# Patient Record
Sex: FEMALE | Race: WHITE | NOT HISPANIC OR LATINO | Employment: UNEMPLOYED | URBAN - METROPOLITAN AREA
[De-identification: names, ages, dates, MRNs, and addresses within clinical notes are randomized per-mention and may not be internally consistent; named-entity substitution may affect disease eponyms.]

---

## 2017-01-12 DIAGNOSIS — F41.1 GAD (GENERALIZED ANXIETY DISORDER): ICD-10-CM

## 2017-01-12 RX ORDER — TRAZODONE HYDROCHLORIDE 150 MG/1
TABLET ORAL
Qty: 90 TABLET | Refills: 1 | Status: SHIPPED | OUTPATIENT
Start: 2017-01-12 | End: 2017-07-10 | Stop reason: SDUPTHER

## 2017-04-07 DIAGNOSIS — I10 ESSENTIAL HYPERTENSION: ICD-10-CM

## 2017-04-07 RX ORDER — METOPROLOL SUCCINATE 50 MG/1
TABLET, EXTENDED RELEASE ORAL
Qty: 90 TABLET | Refills: 2 | Status: SHIPPED | OUTPATIENT
Start: 2017-04-07 | End: 2017-11-09 | Stop reason: SDUPTHER

## 2017-05-10 ENCOUNTER — CLINICAL SUPPORT (OUTPATIENT)
Dept: FAMILY MEDICINE | Facility: CLINIC | Age: 52
End: 2017-05-10
Payer: COMMERCIAL

## 2017-05-10 DIAGNOSIS — F41.1 GAD (GENERALIZED ANXIETY DISORDER): ICD-10-CM

## 2017-05-10 LAB
ALBUMIN SERPL BCP-MCNC: 4.1 G/DL
ALP SERPL-CCNC: 43 U/L
ALT SERPL W/O P-5'-P-CCNC: 15 U/L
ANION GAP SERPL CALC-SCNC: 8 MMOL/L
AST SERPL-CCNC: 20 U/L
BASOPHILS # BLD AUTO: 0.03 K/UL
BASOPHILS NFR BLD: 0.5 %
BILIRUB SERPL-MCNC: 0.8 MG/DL
BUN SERPL-MCNC: 11 MG/DL
CALCIUM SERPL-MCNC: 9.7 MG/DL
CHLORIDE SERPL-SCNC: 108 MMOL/L
CO2 SERPL-SCNC: 26 MMOL/L
CREAT SERPL-MCNC: 0.7 MG/DL
DIFFERENTIAL METHOD: ABNORMAL
EOSINOPHIL # BLD AUTO: 0.2 K/UL
EOSINOPHIL NFR BLD: 2.6 %
ERYTHROCYTE [DISTWIDTH] IN BLOOD BY AUTOMATED COUNT: 11.5 %
EST. GFR  (AFRICAN AMERICAN): >60 ML/MIN/1.73 M^2
EST. GFR  (NON AFRICAN AMERICAN): >60 ML/MIN/1.73 M^2
GLUCOSE SERPL-MCNC: 98 MG/DL
HCT VFR BLD AUTO: 44 %
HGB BLD-MCNC: 15 G/DL
LYMPHOCYTES # BLD AUTO: 1.7 K/UL
LYMPHOCYTES NFR BLD: 28.8 %
MCH RBC QN AUTO: 32.4 PG
MCHC RBC AUTO-ENTMCNC: 34.1 %
MCV RBC AUTO: 95 FL
MONOCYTES # BLD AUTO: 0.6 K/UL
MONOCYTES NFR BLD: 10.1 %
NEUTROPHILS # BLD AUTO: 3.3 K/UL
NEUTROPHILS NFR BLD: 58 %
PLATELET # BLD AUTO: 153 K/UL
PMV BLD AUTO: 10.6 FL
POTASSIUM SERPL-SCNC: 4.4 MMOL/L
PROT SERPL-MCNC: 7.2 G/DL
RBC # BLD AUTO: 4.63 M/UL
SODIUM SERPL-SCNC: 142 MMOL/L
WBC # BLD AUTO: 5.73 K/UL

## 2017-05-10 PROCEDURE — 36415 COLL VENOUS BLD VENIPUNCTURE: CPT | Mod: S$GLB,,, | Performed by: FAMILY MEDICINE

## 2017-05-10 PROCEDURE — 85025 COMPLETE CBC W/AUTO DIFF WBC: CPT

## 2017-05-10 PROCEDURE — 80053 COMPREHEN METABOLIC PANEL: CPT

## 2017-05-14 DIAGNOSIS — F41.1 GAD (GENERALIZED ANXIETY DISORDER): ICD-10-CM

## 2017-05-15 ENCOUNTER — OFFICE VISIT (OUTPATIENT)
Dept: FAMILY MEDICINE | Facility: CLINIC | Age: 52
End: 2017-05-15
Payer: COMMERCIAL

## 2017-05-15 VITALS
HEART RATE: 84 BPM | BODY MASS INDEX: 27.38 KG/M2 | RESPIRATION RATE: 20 BRPM | HEIGHT: 61 IN | SYSTOLIC BLOOD PRESSURE: 100 MMHG | DIASTOLIC BLOOD PRESSURE: 60 MMHG | WEIGHT: 145 LBS

## 2017-05-15 DIAGNOSIS — Z00.00 ENCOUNTER FOR PREVENTIVE HEALTH EXAMINATION: ICD-10-CM

## 2017-05-15 DIAGNOSIS — F41.1 GAD (GENERALIZED ANXIETY DISORDER): ICD-10-CM

## 2017-05-15 DIAGNOSIS — E78.5 HYPERLIPIDEMIA, UNSPECIFIED HYPERLIPIDEMIA TYPE: ICD-10-CM

## 2017-05-15 DIAGNOSIS — Z79.899 LONG-TERM USE OF HIGH-RISK MEDICATION: ICD-10-CM

## 2017-05-15 DIAGNOSIS — I10 ESSENTIAL HYPERTENSION: Primary | ICD-10-CM

## 2017-05-15 DIAGNOSIS — F31.9 BIPOLAR DEPRESSION: ICD-10-CM

## 2017-05-15 PROCEDURE — 1160F RVW MEDS BY RX/DR IN RCRD: CPT | Mod: S$GLB,,, | Performed by: FAMILY MEDICINE

## 2017-05-15 PROCEDURE — 99213 OFFICE O/P EST LOW 20 MIN: CPT | Mod: S$GLB,,, | Performed by: FAMILY MEDICINE

## 2017-05-15 PROCEDURE — 3078F DIAST BP <80 MM HG: CPT | Mod: S$GLB,,, | Performed by: FAMILY MEDICINE

## 2017-05-15 PROCEDURE — 99999 PR PBB SHADOW E&M-EST. PATIENT-LVL II: CPT | Mod: PBBFAC,,, | Performed by: FAMILY MEDICINE

## 2017-05-15 PROCEDURE — 3074F SYST BP LT 130 MM HG: CPT | Mod: S$GLB,,, | Performed by: FAMILY MEDICINE

## 2017-05-15 RX ORDER — ALPRAZOLAM 1 MG/1
1 TABLET ORAL 3 TIMES DAILY PRN
Qty: 90 TABLET | Refills: 5 | Status: SHIPPED | OUTPATIENT
Start: 2017-05-15 | End: 2017-10-31 | Stop reason: SDUPTHER

## 2017-05-15 RX ORDER — ARIPIPRAZOLE 5 MG/1
TABLET ORAL
Qty: 90 TABLET | Refills: 1 | Status: SHIPPED | OUTPATIENT
Start: 2017-05-15 | End: 2017-11-09 | Stop reason: SDUPTHER

## 2017-05-15 NOTE — MR AVS SNAPSHOT
[address] [facility] [address]  Phone: [phone]  Fax: [phone]                  Lyndsey Gan   5/15/2017 10:30 AM   Office Visit    Description:  Female : 1965   Provider:  Delbert Sanchez MD   Department:  [facility]           Reason for Visit     Follow-up     Medication Refill           Diagnoses this Visit        Comments    Essential hypertension    -  Primary     BILL (generalized anxiety disorder)         Hyperlipidemia, unspecified hyperlipidemia type         Encounter for preventive health examination         Long-term use of high-risk medication         Bipolar depression                To Do List           Goals (5 Years of Data)     None      Follow-Up and Disposition     Return in about 1 year (around 5/15/2018).    Follow-up and Disposition History       These Medications        Disp Refills Start End    alprazolam (XANAX) 1 MG tablet 90 tablet 5 5/15/2017     Take 1 tablet (1 mg total) by mouth 3 (three) times daily as needed. - Oral    Pharmacy: [facility] [place] [address] Ph #: [phone]         [facility]     [facility] Care Line -  Assistance  Unless otherwise directed by your provider, please contact [facility], our nurse care line that is available for  assistance.     Registered nurses in the [facility] provide: appointment scheduling, clinical advisement, health education, and other advisory services.  Call: [phone] (toll free)               Medications           Message regarding Medications     Verify the changes and/or additions to your medication regime listed below are the same as discussed with your clinician today.  If any of these changes or additions are incorrect, please notify your healthcare provider.        STOP taking these medications     albuterol (PROAIR HFA) 90 mcg/actuation inhaler Inhale 2 puffs into  "the lungs every 6 (six) hours as needed for Wheezing.    estradiol (VAGIFEM) 10 mcg Tab Place 1 tablet (10 mcg total) vaginally twice a week.           Verify that the below list of medications is an accurate representation of the medications you are currently taking.  If none reported, the list may be blank. If incorrect, please contact your healthcare provider. Carry this list with you in case of emergency.           Current Medications     alprazolam (XANAX) 1 MG tablet Take 1 tablet (1 mg total) by mouth 3 (three) times daily as needed.    aripiprazole (ABILIFY) 5 MG Tab TAKE 1 TABLET DAILY    metoprolol succinate (TOPROL-XL) 50 MG 24 hr tablet TAKE 1 TABLET DAILY    trazodone (DESYREL) 150 MG tablet TAKE 1 TABLET NIGHTLY           Clinical Reference Information           Your Vitals Were     BP Pulse Resp Height Weight BMI    100/60 84 20 5' 1" (1.549 m) 65.8 kg (145 lb) 27.4 kg/m2      Blood Pressure          Most Recent Value    BP  100/60      Allergies as of 5/15/2017     No Known Allergies      Immunizations Administered on Date of Encounter - 5/15/2017     None      [facility] Sign-Up     Activating your [facility] account is as easy as 1-2-3!     1) Visit [email or web address], select Sign Up Now, enter this activation code and your date of birth, then select Next.  [email or web address]  Expires: 6/29/2017 11:46 AM      2) Create a username and password to use when you visit [facility] in the future and select a security question in case you lose your password and select Next.    3) Enter your e-mail address and click Sign Up!    Additional Information  If you have questions, please e-mail [email or web address] or call [phone] to talk to our [facility] staff. Remember, [facility] is NOT to be used for urgent needs. For medical emergencies, dial 911.         Smoking Cessation     If you would like to quit smoking:   You may be eligible for free services if you are a [place] resident and started smoking " cigarettes before September 1, 1988.  Call the Smoking Cessation Trust (SCT) toll free at (745) 507-1831 or (446) 902-4247.   Call 1-800-QUIT-NOW if you do not meet the above criteria.   Contact us via email: [email or web address]   View our website for more information: [email or web address]        Language Assistance Services     ATTENTION: Language assistance services are available, free of charge. Please call [phone].      ATENCIÓN: Si habla español, tiene a schwartz disposición servicios gratuitos de asistencia lingüística. Llame al [phone].     CHÚ Ý: N?u b?n nói Ti?ng Vi?t, có các d?ch v? h? tr? ngôn ng? mi?n phí dành cho b?n. G?i s? [phone].         [facility] complies with applicable Federal civil rights laws and does not discriminate on the basis of race, color, national origin, age, disability, or sex.

## 2017-05-15 NOTE — PROGRESS NOTES
Subjective:       Patient ID: Lyndsey Gan is a 51 y.o. female.    Chief Complaint: Follow-up and Medication Refill    Medication Refill   This is a chronic problem. The problem has been unchanged. Pertinent negatives include no abdominal pain, chest pain, chills, coughing, fever, headaches, joint swelling, nausea, numbness, rash, sore throat, vomiting or weakness. Associated symptoms comments: Social anxiety. The symptoms are aggravated by stress (People make her nervous).     Review of Systems   Constitutional: Negative for appetite change, chills and fever.   HENT: Negative for rhinorrhea, sinus pressure, sore throat and trouble swallowing.    Respiratory: Negative for cough, chest tightness, shortness of breath and wheezing.    Cardiovascular: Negative for chest pain and palpitations.   Gastrointestinal: Negative for abdominal pain, blood in stool, diarrhea, nausea and vomiting.   Genitourinary: Negative for dysuria, flank pain, hematuria, pelvic pain, urgency, vaginal bleeding, vaginal discharge and vaginal pain.   Musculoskeletal: Negative for back pain, joint swelling and neck stiffness.   Skin: Negative for rash.   Neurological: Negative for dizziness, weakness, light-headedness, numbness and headaches.        Sleeping well with diffuser   Hematological: Does not bruise/bleed easily.   Psychiatric/Behavioral: Negative for agitation. The patient is not nervous/anxious.         Social anxiety         Objective:      Physical Exam   Constitutional: She is oriented to person, place, and time. She appears well-developed and well-nourished.   HENT:   Head: Normocephalic.   Mouth/Throat: No oropharyngeal exudate.   Eyes: Pupils are equal, round, and reactive to light.   Neck: Normal range of motion. Neck supple. No thyromegaly present.   Cardiovascular: Normal rate and regular rhythm.    Pulmonary/Chest: No respiratory distress. She has no wheezes. She has no rales. She exhibits no tenderness.   Abdominal: She  exhibits no distension. There is no tenderness. There is no rebound and no guarding.   Musculoskeletal: Normal range of motion. She exhibits no edema or tenderness.   Lymphadenopathy:     She has no cervical adenopathy.   Neurological: She is alert and oriented to person, place, and time. She has normal reflexes. She displays normal reflexes. No cranial nerve deficit. She exhibits normal muscle tone. Coordination normal.   Skin: Skin is warm and dry. No rash noted. No pallor.   Psychiatric: She has a normal mood and affect. Judgment and thought content normal.       Assessment:       No diagnosis found.    Plan:   There are no diagnoses linked to this encounter.

## 2017-07-10 DIAGNOSIS — F41.1 GAD (GENERALIZED ANXIETY DISORDER): ICD-10-CM

## 2017-07-11 RX ORDER — TRAZODONE HYDROCHLORIDE 150 MG/1
TABLET ORAL
Qty: 90 TABLET | Refills: 0 | Status: SHIPPED | OUTPATIENT
Start: 2017-07-11 | End: 2017-10-08 | Stop reason: SDUPTHER

## 2017-10-08 DIAGNOSIS — F41.1 GAD (GENERALIZED ANXIETY DISORDER): ICD-10-CM

## 2017-10-09 RX ORDER — TRAZODONE HYDROCHLORIDE 150 MG/1
TABLET ORAL
Qty: 90 TABLET | Refills: 0 | Status: SHIPPED | OUTPATIENT
Start: 2017-10-09 | End: 2017-11-09 | Stop reason: SDUPTHER

## 2017-10-31 DIAGNOSIS — F31.9 BIPOLAR DEPRESSION: ICD-10-CM

## 2017-10-31 RX ORDER — ALPRAZOLAM 1 MG/1
1 TABLET ORAL 2 TIMES DAILY PRN
Qty: 60 TABLET | Refills: 5 | Status: SHIPPED | OUTPATIENT
Start: 2017-10-31 | End: 2017-12-19 | Stop reason: SDUPTHER

## 2017-10-31 NOTE — TELEPHONE ENCOUNTER
----- Message from Linda Dc sent at 10/31/2017  3:31 PM CDT -----  Contact: Shaheed Gan  MRN: [id number]  : 1965  PCP: Delbert Sanchez  Home Phone      [phone]  Work Phone      Not on file.  [employer]          [phone]    MESSAGE:   Needs RX alprazolam (XANAX) 1 MG tablet refilled    Ph[facility]acy:  [facility] [place]    Phone:  [phone]

## 2017-11-09 DIAGNOSIS — F41.1 GAD (GENERALIZED ANXIETY DISORDER): ICD-10-CM

## 2017-11-09 DIAGNOSIS — I10 ESSENTIAL HYPERTENSION: ICD-10-CM

## 2017-11-09 RX ORDER — TRAZODONE HYDROCHLORIDE 150 MG/1
150 TABLET ORAL NIGHTLY
Qty: 90 TABLET | Refills: 0 | Status: SHIPPED | OUTPATIENT
Start: 2017-11-09 | End: 2018-03-15 | Stop reason: SDUPTHER

## 2017-11-09 RX ORDER — ARIPIPRAZOLE 5 MG/1
5 TABLET ORAL DAILY
Qty: 90 TABLET | Refills: 0 | Status: SHIPPED | OUTPATIENT
Start: 2017-11-09 | End: 2018-02-08 | Stop reason: SDUPTHER

## 2017-11-09 RX ORDER — METOPROLOL SUCCINATE 50 MG/1
50 TABLET, EXTENDED RELEASE ORAL DAILY
Qty: 90 TABLET | Refills: 1 | Status: SHIPPED | OUTPATIENT
Start: 2017-11-09 | End: 2018-05-23 | Stop reason: SDUPTHER

## 2017-11-09 NOTE — TELEPHONE ENCOUNTER
----- Message from Blair Ponce sent at 2017  4:19 PM CST -----  Contact: Patient  Lyndsey Gan  MRN: [id number]  : 1965  PCP: Delbert Sanchez  Home Phone      [phone]  Work Phone      Not on file.  [employer]          [phone]      MESSAGE: requesting refills on Abilify - Toprol - Trazodone -- be sent to [facility]     Call [phone]    PCP: Daniel

## 2017-12-04 ENCOUNTER — TELEPHONE (OUTPATIENT)
Dept: FAMILY MEDICINE | Facility: CLINIC | Age: 52
End: 2017-12-04

## 2017-12-04 NOTE — TELEPHONE ENCOUNTER
----- Message from Jovannydelphine Yoko sent at 2017  4:42 PM CST -----  Contact: padma Gan  MRN: [id number]  : 1965  PCP: Delbert Sanchez  Home Phone      [phone]  Work Phone      Not on file.  [employer]          [phone]      MESSAGE: PT STATED THAT HER XANAX RX CHANGED WITHOUT HER KNOWING. SHE SAID SHE USUALLY TAKES 3 A DAY BUT IT WAS CHANGED TO TWICE A DAY. PLEASE CALL. SAID SHE IS VERY CONCERNED ALREADY AND NEEDS THOSE MEDS.     PHONE: [phone]

## 2017-12-15 DIAGNOSIS — Z12.11 COLON CANCER SCREENING: ICD-10-CM

## 2017-12-19 ENCOUNTER — OFFICE VISIT (OUTPATIENT)
Dept: INTERNAL MEDICINE | Facility: CLINIC | Age: 52
End: 2017-12-19
Payer: COMMERCIAL

## 2017-12-19 VITALS
HEART RATE: 70 BPM | DIASTOLIC BLOOD PRESSURE: 82 MMHG | OXYGEN SATURATION: 99 % | SYSTOLIC BLOOD PRESSURE: 128 MMHG | HEIGHT: 61 IN | RESPIRATION RATE: 18 BRPM | WEIGHT: 136 LBS | BODY MASS INDEX: 25.68 KG/M2

## 2017-12-19 DIAGNOSIS — Z71.6 TOBACCO ABUSE COUNSELING: ICD-10-CM

## 2017-12-19 DIAGNOSIS — F41.9 ANXIETY: Primary | ICD-10-CM

## 2017-12-19 DIAGNOSIS — I10 BENIGN ESSENTIAL HTN: ICD-10-CM

## 2017-12-19 DIAGNOSIS — Z72.0 TOBACCO ABUSE: ICD-10-CM

## 2017-12-19 DIAGNOSIS — F31.9 BIPOLAR DEPRESSION: ICD-10-CM

## 2017-12-19 DIAGNOSIS — E78.5 HYPERLIPIDEMIA LDL GOAL <70: ICD-10-CM

## 2017-12-19 PROCEDURE — 99214 OFFICE O/P EST MOD 30 MIN: CPT | Mod: S$GLB,,, | Performed by: NURSE PRACTITIONER

## 2017-12-19 PROCEDURE — 99999 PR PBB SHADOW E&M-EST. PATIENT-LVL IV: CPT | Mod: PBBFAC,,, | Performed by: NURSE PRACTITIONER

## 2017-12-19 RX ORDER — ALPRAZOLAM 1 MG/1
1 TABLET ORAL 3 TIMES DAILY PRN
Qty: 90 TABLET | Refills: 5 | Status: SHIPPED | OUTPATIENT
Start: 2017-12-19 | End: 2018-05-28 | Stop reason: SDUPTHER

## 2017-12-19 NOTE — PATIENT INSTRUCTIONS
Anxiety Reaction  Anxiety is the feeling we all get when we think something bad might happen. It is a normal response to stress and usually causes only a mild reaction. When anxiety becomes more severe, it can interfere with daily life. In some cases, you may not even be aware of what it is youre anxious about. There may also be a genetic link or it may be a learned behavior in the home.  Both psychological and physical triggers cause stress reaction. It's often a response to fear or emotional stress, real or imagined. This stress may come from home, family, work, or social relationships.  During an anxiety reaction, you may feel:  · Helpless  · Nervous  · Depressed  · Irritable  Your body may show signs of anxiety in many ways. You may experience:  · Dry mouth  · Shakiness  · Dizziness  · Weakness  · Trouble breathing  · Breathing fast (hyperventilating)  · Chest pressure  · Sweating  · Headache  · Nausea  · Diarrhea  · Tiredness  · Inability to sleep  · Sexual problems  Home care  · Try to locate the sources of stress in your life. They may not be obvious. These may include:  ¨ Daily hassles of life (traffic jams, missed appointments, car troubles, etc.)  ¨ Major life changes, both good (new baby, job promotion) and bad (loss of job, loss of loved one)  ¨ Overload: feeling that you have too many responsibilities and can't take care of all of them at once  ¨ Feeling helpless, feeling that your problems are beyond what youre able to solve  · Notice how your body reacts to stress. Learn to listen to your body signals. This will help you take action before the stress becomes severe.  · When you can, do something about the source of your stress. (Avoid hassles, limit the amount of change that happens in your life at one time and take a break when you feel overloaded).  · Unfortunately, many stressful situations can't be avoided. It is necessary to learn how to better manage stress. There are many proven methods  "that will reduce your anxiety. These include simple things like exercise, good nutrition and adequate rest. Also, there are certain techniques that are helpful:  ¨ Relaxation  ¨ Breathing exercises  ¨ Visualization  ¨ Biofeedback  ¨ Meditation  For more information about this, consult your doctor or go to a local bookstore and review the many books and tapes available on this subject.  Follow-up care  If you feel that your anxiety is not responding to self-help measures, contact your doctor or make an appointment with a counselor. You may need short-term psychological counseling and temporary medicine to help you manage stress.  Call 911  Call your healthcare provider right away if any of these occur:  · Trouble breathing  · Confusion  · Drowsiness or trouble wakening  · Fainting or loss of consciousness  · Rapid heart rate  · Seizure  · New chest pain that becomes more severe, lasts longer, or spreads into your shoulder, arm, neck, jaw, or back  When to seek medical advice  Call your healthcare provider right away if any of these occur:  · Your symptoms get worse  · Severe headache not relieved by rest and mild pain reliever  Date Last Reviewed: 9/29/2015  © 2757-9815 [employer]. [address] [place] [address]. All rights reserved. This information is not intended as a substitute for professional medical care. Always follow your healthcare professional's instructions.        Bipolar Disorder  Bipolar disorder is an illness that causes strong mood swings between depression and louis. It used to be called "manic depression." The mood swings are different from the normal ups and downs we all normally experience in our lives. They are more severe, last longer, and can interfere with work and relationships. These episodes are changes from our usual moods and behavior. Their severity can be mild, or drastic and explosive.  · In a manic episode, you may think fast and do things quickly. It may " seem like you are getting a lot done. At first, this may feel very good. But in the extreme this can lead to a lifestyle that is disorganized, chaotic, and includes risky behavior (spending sprees, sexual acting-out, or drug use). In later stages, it may affect eating (no interest in food) and sleeping (unable to sleep for days at a time). Speech may speed up and become difficult for others to understand. You may appear to others as if you are in your own world.  · In a depressive episode, you may feel a lack of interest in normal activities. Sometimes there is sadness or guilt without any clear reason. Thinking may become slow and there can be a lack energy or feeling of hopelessness. Some people have thoughts of harming themselves at this stage. Thoughts can even turn to suicide.  Between these phases you may actually feel OK. This does not mean that the illness is gone. People with this disorder will usually have to treat it all of their life. Medicine and good care can greatly reduce the symptoms.  The exact cause of this illness is unknown. However, there is a genetic link that makes a person more likely to get this problem. Also, the use of drugs such as speed (amphetamine) and cocaine increase the chances of this illness appearing.  Home care  · On-going care and support helps people manage this disease.  Find a healthcare provider and therapist who meet your needs.  Seek help when you feel like you may be heading into either a manic episode or a depressive state.  · Be sure to take your medicine and get regular blood work to check your the levels of medicine in your body.  Take the medicine and get the follow-up lab work as prescribed, even if you think you dont need to do it.  · Be certain to tell each of your healthcare providers about all of the prescription drugs, over-the-counter medicines, and supplements you take. Certain supplements interact with medicines and result in dangerous side effects.  You  can also use your pharmacist as a resource person when you have questions about drug interactions.  · Talk with your family and trusted friends about your thoughts and feelings.  Ask them to help you recognize behavior changes early so you can get help and medicines can be adjusted.  · Alcohol and drugs can bring on an episode, and also make them worse  · If your life is severely impacted by this illness, the Americans with Disabilities Act (ADA) may provide help. The ADA protects people with chronic physical and mental health problems. If you are having trouble keeping jobs, managing workplace issues, or caring for yourself because of your bipolar disorder, contact your local ADA office to see if they can help. The [place] Department  of Justice operates a toll-free ADA information line at: 234.493.8983 (Voice); or 463-634-7992 (TTY).  They can help you locate a local office.    Follow-up care  Follow up with your doctor or therapist as advised. They can help you to find ways to improve your life.  Call 911  Call your healthcare provider right away if any of these occur:  · You have suicidal thoughts, a plan, and the means to  harm yourself  · Trouble breathing  · Confusion  · Drowsiness or trouble wakening  · Fainting or loss of consciousness  · Rapid heart rate, very low heart rate, or a new irregular heart rate  · Seizure  · New chest pain that becomes more severe, lasts longer, or spreads into your shoulder, arm, neck, jaw, or back  When to seek medical advice  Call your healthcare provider right away if any of these occur:  · Feeling like your symptoms are getting worse (depression, agitation, excess energy)  · Unable to eat or sleep for more than 48 hours  · Feeling out of control (racing thoughts, poor concentration)  · Feeling like you want to harm yourself or another  · Being unable to care for yourself  Date Last Reviewed: 9/29/2015  © 5206-9685 [employer]. [address] [facility], [place]  [address]. All rights reserved. This information is not intended as a substitute for professional medical care. Always follow your healthcare professional's instructions.

## 2017-12-19 NOTE — PROGRESS NOTES
Subjective:       Patient ID: Lyndsey Gan is a 52 y.o. female.    Chief Complaint: Medication Management (discuss xanax)    Patient is unknown, to me and presents with   Chief Complaint   Patient presents with    Medication Management     discuss xanax   .  Denies chest pain and shortness of breath.  Patient presents with second opinion and states that she has always taken her xanax tid and lately was changed to bid but states that she really needs it tid. She is a nervous person and states that she has always been on this and not sure why it was changed to bid. Also takes abilify.  She would like to switch over to my clinic. Patient will get mammogram and declines colonoscopy at this time     HPI  Review of Systems   Constitutional: Negative for activity change, appetite change, fatigue, fever and unexpected weight change.   HENT: Negative for congestion, ear discharge, ear pain, hearing loss, postnasal drip and tinnitus.    Eyes: Negative for photophobia, pain and visual disturbance.   Respiratory: Negative for cough, shortness of breath, wheezing and stridor.    Cardiovascular: Negative for chest pain, palpitations and leg swelling.   Gastrointestinal: Negative for abdominal distention.   Genitourinary: Negative for difficulty urinating, dysuria, frequency, hematuria and urgency.   Musculoskeletal: Negative for arthralgias, back pain, gait problem, joint swelling and neck pain.   Skin: Negative.    Neurological: Negative for dizziness, seizures, syncope, weakness, light-headedness, numbness and headaches.   Hematological: Negative for adenopathy. Does not bruise/bleed easily.   Psychiatric/Behavioral: Negative for behavioral problems, confusion, hallucinations, sleep disturbance and suicidal ideas. The patient is nervous/anxious.        Objective:      Physical Exam   Constitutional: She is oriented to person, place, and time. She appears well-developed and well-nourished. No distress.   HENT:   Head:  Normocephalic and atraumatic.   Right Ear: External ear normal.   Left Ear: External ear normal.   Mouth/Throat: Oropharynx is clear and moist. No oropharyngeal exudate.   Eyes: Conjunctivae and EOM are normal. Pupils are equal, round, and reactive to light. Right eye exhibits no discharge. Left eye exhibits no discharge.   Neck: Normal range of motion. Neck supple. No JVD present. No thyromegaly present.   Cardiovascular: Normal rate, regular rhythm, normal heart sounds and intact distal pulses.  Exam reveals no gallop and no friction rub.    No murmur heard.  Pulmonary/Chest: Effort normal and breath sounds normal. No stridor. No respiratory distress. She has no wheezes. She has no rales. She exhibits no tenderness.   Abdominal: Soft. Bowel sounds are normal. She exhibits no distension and no mass. There is no tenderness. There is no rebound.   Musculoskeletal: Normal range of motion. She exhibits no edema or tenderness.   Lymphadenopathy:     She has no cervical adenopathy.   Neurological: She is alert and oriented to person, place, and time. She has normal reflexes. She displays normal reflexes. No cranial nerve deficit. She exhibits normal muscle tone. Coordination normal.   Skin: Skin is warm and dry. Capillary refill takes less than 2 seconds. No rash noted. No erythema. No pallor.   Psychiatric: She has a normal mood and affect. Her behavior is normal. Judgment and thought content normal.   Negative suicidal or homicidal ideations       Assessment:       1. Anxiety    2. Bipolar depression    3. Hyperlipidemia LDL goal <70    4. Benign essential HTN    5. Tobacco abuse    6. Tobacco abuse counseling        Plan:   Lyndsey was seen today for medication management.    Diagnoses and all orders for this visit:    Anxiety  -     Comprehensive metabolic panel; Future  -     CBC auto differential; Future  -     ALPRAZolam (XANAX) 1 MG tablet; Take 1 tablet (1 mg total) by mouth 3 (three) times daily as needed for  "Anxiety (severe anxiety).    Bipolar depression  -     Comprehensive metabolic panel; Future  -     CBC auto differential; Future  -     ALPRAZolam (XANAX) 1 MG tablet; Take 1 tablet (1 mg total) by mouth 3 (three) times daily as needed for Anxiety (severe anxiety).    Hyperlipidemia LDL goal <70  -     Comprehensive metabolic panel; Future  -     CBC auto differential; Future  -     TSH; Future  -     Lipid panel; Future    Benign essential HTN  -     Comprehensive metabolic panel; Future  -     CBC auto differential; Future  -     Microalbumin/creatinine urine ratio; Future    Tobacco abuse  -     Comprehensive metabolic panel; Future  -     CBC auto differential; Future    Tobacco abuse counseling    "This note will not be shared with the patient."  Will start coming here for medication management and check up  Lab drawn today CBC, CMP, TSH, FLP  Limit the cholesterol in your diet to less than 300 mg per day.  Fats should contribute no more than 20 to 35% of your daily calories.  Less than 7 to 10% of your calories should come from saturated fat.  Avoid saturated fat products e.g., butter, some oils, meat, and poultry fat contain a lot of saturated fat.  Check food labels for fat and cholesterol content. Choose the foods with less fat per serving.  Limit the amount of butter and margarine you eat.  Use salad dressings and margarine made with polyunsaturated and monunsaturated fats.  Use egg whites or egg substitutes rather than whole eggs.  Replace whole-milk dairy products with nonfat or low-fat mild, cheese, spreads, and yogurt.  Eat skinless chicken, turkey, fish, and meatless entrees more often than red meat.  Choose lean cuts of meat and trim off all visible fat. Keep portion sizes moderate.  Avoid fatty desserts such as ice cream, cream-filled cakes, and cheesecakes. Choose fresh fruits, nonfat frozen yogurt, Popsicles, etc.  Reduce the amount of fried foods, vending machine food, and fast food you " eat.  Eat fruits and vegetables (especially fresh fruits and leafy vegetables), beans, and whole grains daily. The fiber in these foods helps lower cholesterol.  Look for low-fat or nonfat varieties of the foods you like to eat or look for substitutes.  You may need to exercise 60 minutes a day to prevent weight gain and 90 minutes a day to lose weight.  RTC in six months.

## 2017-12-22 ENCOUNTER — CLINICAL SUPPORT (OUTPATIENT)
Dept: INTERNAL MEDICINE | Facility: CLINIC | Age: 52
End: 2017-12-22
Payer: COMMERCIAL

## 2017-12-22 DIAGNOSIS — E78.5 HYPERLIPIDEMIA LDL GOAL <70: ICD-10-CM

## 2017-12-22 DIAGNOSIS — I10 BENIGN ESSENTIAL HTN: ICD-10-CM

## 2017-12-22 DIAGNOSIS — F31.9 BIPOLAR DEPRESSION: ICD-10-CM

## 2017-12-22 DIAGNOSIS — F41.9 ANXIETY: ICD-10-CM

## 2017-12-22 DIAGNOSIS — Z72.0 TOBACCO ABUSE: ICD-10-CM

## 2017-12-22 LAB
ALBUMIN SERPL BCP-MCNC: 4.1 G/DL
ALP SERPL-CCNC: 53 U/L
ALT SERPL W/O P-5'-P-CCNC: 20 U/L
ANION GAP SERPL CALC-SCNC: 8 MMOL/L
AST SERPL-CCNC: 25 U/L
BASOPHILS # BLD AUTO: 0.03 K/UL
BASOPHILS NFR BLD: 0.5 %
BILIRUB SERPL-MCNC: 0.7 MG/DL
BUN SERPL-MCNC: 10 MG/DL
CALCIUM SERPL-MCNC: 9.9 MG/DL
CHLORIDE SERPL-SCNC: 106 MMOL/L
CHOLEST SERPL-MCNC: 201 MG/DL
CHOLEST/HDLC SERPL: 3.8 {RATIO}
CO2 SERPL-SCNC: 27 MMOL/L
CREAT SERPL-MCNC: 0.7 MG/DL
CREAT UR-MCNC: 164 MG/DL
DIFFERENTIAL METHOD: ABNORMAL
EOSINOPHIL # BLD AUTO: 0.2 K/UL
EOSINOPHIL NFR BLD: 2.5 %
ERYTHROCYTE [DISTWIDTH] IN BLOOD BY AUTOMATED COUNT: 11.5 %
EST. GFR  (AFRICAN AMERICAN): >60 ML/MIN/1.73 M^2
EST. GFR  (NON AFRICAN AMERICAN): >60 ML/MIN/1.73 M^2
GLUCOSE SERPL-MCNC: 97 MG/DL
HCT VFR BLD AUTO: 42.9 %
HDLC SERPL-MCNC: 53 MG/DL
HDLC SERPL: 26.4 %
HGB BLD-MCNC: 15.2 G/DL
IMM GRANULOCYTES # BLD AUTO: 0.02 K/UL
IMM GRANULOCYTES NFR BLD AUTO: 0.3 %
LDLC SERPL CALC-MCNC: 118.4 MG/DL
LYMPHOCYTES # BLD AUTO: 1.8 K/UL
LYMPHOCYTES NFR BLD: 27.7 %
MCH RBC QN AUTO: 33.2 PG
MCHC RBC AUTO-ENTMCNC: 35.4 G/DL
MCV RBC AUTO: 94 FL
MICROALBUMIN UR DL<=1MG/L-MCNC: 16 UG/ML
MICROALBUMIN/CREATININE RATIO: 9.8 UG/MG
MONOCYTES # BLD AUTO: 0.6 K/UL
MONOCYTES NFR BLD: 9 %
NEUTROPHILS # BLD AUTO: 3.9 K/UL
NEUTROPHILS NFR BLD: 60 %
NONHDLC SERPL-MCNC: 148 MG/DL
NRBC BLD-RTO: 0 /100 WBC
PLATELET # BLD AUTO: 185 K/UL
PMV BLD AUTO: 10.5 FL
POTASSIUM SERPL-SCNC: 3.9 MMOL/L
PROT SERPL-MCNC: 7.4 G/DL
RBC # BLD AUTO: 4.58 M/UL
SODIUM SERPL-SCNC: 141 MMOL/L
TRIGL SERPL-MCNC: 148 MG/DL
TSH SERPL DL<=0.005 MIU/L-ACNC: 0.96 UIU/ML
WBC # BLD AUTO: 6.43 K/UL

## 2017-12-22 PROCEDURE — 80061 LIPID PANEL: CPT

## 2017-12-22 PROCEDURE — 36415 COLL VENOUS BLD VENIPUNCTURE: CPT | Mod: S$GLB,,, | Performed by: NURSE PRACTITIONER

## 2017-12-22 PROCEDURE — 85025 COMPLETE CBC W/AUTO DIFF WBC: CPT

## 2017-12-22 PROCEDURE — 80053 COMPREHEN METABOLIC PANEL: CPT

## 2017-12-22 PROCEDURE — 82570 ASSAY OF URINE CREATININE: CPT

## 2017-12-22 PROCEDURE — 84443 ASSAY THYROID STIM HORMONE: CPT

## 2017-12-22 PROCEDURE — 36415 COLL VENOUS BLD VENIPUNCTURE: CPT

## 2017-12-22 PROCEDURE — 99999 PR PBB SHADOW E&M-EST. PATIENT-LVL I: CPT | Mod: PBBFAC,,,

## 2018-02-08 DIAGNOSIS — F41.1 GAD (GENERALIZED ANXIETY DISORDER): ICD-10-CM

## 2018-02-08 RX ORDER — ARIPIPRAZOLE 5 MG/1
TABLET ORAL
Qty: 90 TABLET | Refills: 0 | Status: SHIPPED | OUTPATIENT
Start: 2018-02-08 | End: 2018-05-28 | Stop reason: SDUPTHER

## 2018-03-15 DIAGNOSIS — F41.1 GAD (GENERALIZED ANXIETY DISORDER): ICD-10-CM

## 2018-03-15 RX ORDER — TRAZODONE HYDROCHLORIDE 150 MG/1
TABLET ORAL
Qty: 90 TABLET | Refills: 0 | Status: SHIPPED | OUTPATIENT
Start: 2018-03-15 | End: 2018-05-28 | Stop reason: SDUPTHER

## 2018-04-18 ENCOUNTER — TELEPHONE (OUTPATIENT)
Dept: INTERNAL MEDICINE | Facility: CLINIC | Age: 53
End: 2018-04-18

## 2018-04-18 DIAGNOSIS — I10 BENIGN ESSENTIAL HTN: Primary | ICD-10-CM

## 2018-04-18 NOTE — TELEPHONE ENCOUNTER
Pt scheduled for her routine 6 month with you may 28th but needs order put in for her BW the week before  Please advise  Thanks!

## 2018-05-08 DIAGNOSIS — F41.1 GAD (GENERALIZED ANXIETY DISORDER): ICD-10-CM

## 2018-05-08 RX ORDER — ARIPIPRAZOLE 5 MG/1
TABLET ORAL
Qty: 90 TABLET | Refills: 0 | OUTPATIENT
Start: 2018-05-08

## 2018-05-22 ENCOUNTER — CLINICAL SUPPORT (OUTPATIENT)
Dept: INTERNAL MEDICINE | Facility: CLINIC | Age: 53
End: 2018-05-22
Payer: COMMERCIAL

## 2018-05-22 DIAGNOSIS — I10 BENIGN ESSENTIAL HTN: ICD-10-CM

## 2018-05-22 LAB
ALBUMIN SERPL BCP-MCNC: 4.2 G/DL
ALP SERPL-CCNC: 47 U/L
ALT SERPL W/O P-5'-P-CCNC: 16 U/L
ANION GAP SERPL CALC-SCNC: 6 MMOL/L
AST SERPL-CCNC: 23 U/L
BASOPHILS # BLD AUTO: 0.03 K/UL
BASOPHILS NFR BLD: 0.5 %
BILIRUB SERPL-MCNC: 0.7 MG/DL
BUN SERPL-MCNC: 12 MG/DL
CALCIUM SERPL-MCNC: 9.9 MG/DL
CHLORIDE SERPL-SCNC: 104 MMOL/L
CHOLEST SERPL-MCNC: 213 MG/DL
CHOLEST/HDLC SERPL: 4.3 {RATIO}
CO2 SERPL-SCNC: 29 MMOL/L
CREAT SERPL-MCNC: 0.8 MG/DL
CREAT UR-MCNC: 91 MG/DL
DIFFERENTIAL METHOD: ABNORMAL
EOSINOPHIL # BLD AUTO: 0.2 K/UL
EOSINOPHIL NFR BLD: 2.5 %
ERYTHROCYTE [DISTWIDTH] IN BLOOD BY AUTOMATED COUNT: 11.6 %
EST. GFR  (AFRICAN AMERICAN): >60 ML/MIN/1.73 M^2
EST. GFR  (NON AFRICAN AMERICAN): >60 ML/MIN/1.73 M^2
GLUCOSE SERPL-MCNC: 96 MG/DL
HCT VFR BLD AUTO: 44.4 %
HDLC SERPL-MCNC: 49 MG/DL
HDLC SERPL: 23 %
HGB BLD-MCNC: 15.1 G/DL
IMM GRANULOCYTES # BLD AUTO: 0.02 K/UL
IMM GRANULOCYTES NFR BLD AUTO: 0.3 %
LDLC SERPL CALC-MCNC: 125 MG/DL
LYMPHOCYTES # BLD AUTO: 2 K/UL
LYMPHOCYTES NFR BLD: 32.9 %
MCH RBC QN AUTO: 33.1 PG
MCHC RBC AUTO-ENTMCNC: 34 G/DL
MCV RBC AUTO: 97 FL
MICROALBUMIN UR DL<=1MG/L-MCNC: 43 UG/ML
MICROALBUMIN/CREATININE RATIO: 47.3 UG/MG
MONOCYTES # BLD AUTO: 0.7 K/UL
MONOCYTES NFR BLD: 10.8 %
NEUTROPHILS # BLD AUTO: 3.2 K/UL
NEUTROPHILS NFR BLD: 53 %
NONHDLC SERPL-MCNC: 164 MG/DL
NRBC BLD-RTO: 0 /100 WBC
PLATELET # BLD AUTO: 169 K/UL
PMV BLD AUTO: 11 FL
POTASSIUM SERPL-SCNC: 4 MMOL/L
PROT SERPL-MCNC: 7.4 G/DL
RBC # BLD AUTO: 4.56 M/UL
SODIUM SERPL-SCNC: 139 MMOL/L
TRIGL SERPL-MCNC: 195 MG/DL
TSH SERPL DL<=0.005 MIU/L-ACNC: 2.17 UIU/ML
WBC # BLD AUTO: 6.11 K/UL

## 2018-05-22 PROCEDURE — 80061 LIPID PANEL: CPT

## 2018-05-22 PROCEDURE — 36415 COLL VENOUS BLD VENIPUNCTURE: CPT | Mod: S$GLB,,, | Performed by: NURSE PRACTITIONER

## 2018-05-22 PROCEDURE — 36415 COLL VENOUS BLD VENIPUNCTURE: CPT

## 2018-05-22 PROCEDURE — 80053 COMPREHEN METABOLIC PANEL: CPT

## 2018-05-22 PROCEDURE — 84443 ASSAY THYROID STIM HORMONE: CPT

## 2018-05-22 PROCEDURE — 82043 UR ALBUMIN QUANTITATIVE: CPT

## 2018-05-22 PROCEDURE — 85025 COMPLETE CBC W/AUTO DIFF WBC: CPT

## 2018-05-23 DIAGNOSIS — I10 ESSENTIAL HYPERTENSION: ICD-10-CM

## 2018-05-23 RX ORDER — METOPROLOL SUCCINATE 50 MG/1
TABLET, EXTENDED RELEASE ORAL
Qty: 90 TABLET | Refills: 0 | Status: SHIPPED | OUTPATIENT
Start: 2018-05-23 | End: 2018-05-28 | Stop reason: SDUPTHER

## 2018-05-28 ENCOUNTER — OFFICE VISIT (OUTPATIENT)
Dept: INTERNAL MEDICINE | Facility: CLINIC | Age: 53
End: 2018-05-28
Payer: COMMERCIAL

## 2018-05-28 VITALS
OXYGEN SATURATION: 96 % | SYSTOLIC BLOOD PRESSURE: 122 MMHG | WEIGHT: 141 LBS | HEIGHT: 61 IN | HEART RATE: 78 BPM | DIASTOLIC BLOOD PRESSURE: 70 MMHG | BODY MASS INDEX: 26.62 KG/M2 | RESPIRATION RATE: 20 BRPM

## 2018-05-28 DIAGNOSIS — F31.9 BIPOLAR DEPRESSION: ICD-10-CM

## 2018-05-28 DIAGNOSIS — Z71.6 TOBACCO ABUSE COUNSELING: ICD-10-CM

## 2018-05-28 DIAGNOSIS — I10 BENIGN ESSENTIAL HTN: Primary | ICD-10-CM

## 2018-05-28 DIAGNOSIS — E78.5 HYPERLIPIDEMIA LDL GOAL <70: ICD-10-CM

## 2018-05-28 DIAGNOSIS — F41.9 ANXIETY: ICD-10-CM

## 2018-05-28 DIAGNOSIS — G47.00 INSOMNIA, UNSPECIFIED TYPE: ICD-10-CM

## 2018-05-28 DIAGNOSIS — Z72.0 TOBACCO ABUSE: ICD-10-CM

## 2018-05-28 PROCEDURE — 3008F BODY MASS INDEX DOCD: CPT | Mod: CPTII,S$GLB,, | Performed by: NURSE PRACTITIONER

## 2018-05-28 PROCEDURE — 99214 OFFICE O/P EST MOD 30 MIN: CPT | Mod: S$GLB,,, | Performed by: NURSE PRACTITIONER

## 2018-05-28 PROCEDURE — 3074F SYST BP LT 130 MM HG: CPT | Mod: CPTII,S$GLB,, | Performed by: NURSE PRACTITIONER

## 2018-05-28 PROCEDURE — 3078F DIAST BP <80 MM HG: CPT | Mod: CPTII,S$GLB,, | Performed by: NURSE PRACTITIONER

## 2018-05-28 PROCEDURE — 99999 PR PBB SHADOW E&M-EST. PATIENT-LVL IV: CPT | Mod: PBBFAC,,, | Performed by: NURSE PRACTITIONER

## 2018-05-28 RX ORDER — TRAZODONE HYDROCHLORIDE 150 MG/1
150 TABLET ORAL NIGHTLY
Qty: 90 TABLET | Refills: 1 | Status: SHIPPED | OUTPATIENT
Start: 2018-05-28 | End: 2018-12-07 | Stop reason: SDUPTHER

## 2018-05-28 RX ORDER — ARIPIPRAZOLE 5 MG/1
5 TABLET ORAL DAILY
Qty: 90 TABLET | Refills: 1 | Status: SHIPPED | OUTPATIENT
Start: 2018-05-28 | End: 2018-11-23 | Stop reason: SDUPTHER

## 2018-05-28 RX ORDER — ALPRAZOLAM 1 MG/1
1 TABLET ORAL 3 TIMES DAILY PRN
Qty: 90 TABLET | Refills: 5 | Status: SHIPPED | OUTPATIENT
Start: 2018-05-28 | End: 2018-07-10 | Stop reason: SDUPTHER

## 2018-05-28 RX ORDER — METOPROLOL SUCCINATE 50 MG/1
50 TABLET, EXTENDED RELEASE ORAL DAILY
Qty: 90 TABLET | Refills: 1 | Status: SHIPPED | OUTPATIENT
Start: 2018-05-28 | End: 2019-02-27 | Stop reason: SDUPTHER

## 2018-05-28 NOTE — PATIENT INSTRUCTIONS
"  Risk Factors for Heart Disease  A risk factor is something that increases your chance of having heart disease. Heart disease (also called coronary artery disease) involves damage to the heart arteries. These blood vessels need to work well to provide the oxygen your heart needs to pump blood to the rest of your body. Things like smoking or high cholesterol levels can damage arteries. You cant control some risk factors, such as age and a family history of heart disease. But there are many things you can control to reduce your risk for heart disease.    Unhealthy cholesterol levels  Cholesterol is a fat-like substance in your blood. It can build up along the artery walls. This is called plaque. Over time, plaque narrows the arteries and reduces blood flow to your heart or brain. If a blood clot forms or a piece of the plaque breaks off, it can completely block the artery and cause a heart attack or stroke. Your risk of heart disease goes up if you have high levels of LDL ("bad") cholesterol or triglycerides (another fatty substance that can build up). Youre also at risk if you have low HDL cholesterol ("good") cholesterol. HDL helps clear the bad cholesterol away. You're at risk if you have:  HDL cholesterol 50 mg/dL or lower; LDL cholesterol 100 mg/dL; or triglycerides of 150 mg/dL or higher.  Smoking  This is the most important risk factor you can change. Smoking causes inflammation and damages the smooth muscle that lines the arteries making them less flexible. It also raises your blood pressure, causing further damage to the artery lining. Smoking also increases your risk that your blood may clot, block an artery, and cause a heart attack or stroke. Smoking also damages your lungs, which affects the delivery of oxygen to the body. If you smoke, you are 2 to 4 times more likely to develop coronary artery disease. If you smoke, it's never too late to help your heart. Ask your doctor about nicotine replacement " products and smoking cessation support.  High blood pressure  High blood pressure occurs when blood pushes too hard against artery walls. This causes damage to the artery walls and the formation of scar tissue as it heals. This makes the arteries stiff and weak. Plaque sticks to the scarred tissue narrowing and hardening the arteries. High blood pressure also causes your heart to work harder to get blood out to the body. High blood pressure raises your risk of heart attack, also known as acute myocardial infarction, or AMI, and especially stroke. The brain tissue is especially sensitive to high blood pressure damage. You're at risk if your blood pressure is 120/80 or higher.  Negative emotions  Chronic stress, pent-up anger, and other negative emotions have been linked to heart disease. This occurs because stress increases the levels of a hormone that increase the demand on your heart. Over time, these emotions could raise your heart disease risk.  Metabolic syndrome  This is caused by a combination of certain risk factors. It puts you at extra high risk of heart disease, stroke, and diabetes. You have metabolic syndrome if you have 3 or more of the following: low HDL cholesterol; high triglycerides; high blood pressure; high blood sugar; extra weight around the waist.  Diabetes  Diabetes occurs when you have high levels of sugar (glucose) in your blood. This can damage arteries if not kept under control. Having diabetes also makes you more likely to have a silent heart attack--one without any symptoms.  Excess weight  Excess weight makes other risk factors, such as diabetes, more likely. Excess weight around the waist or stomach increases your heart disease risk the most.  Lack of physical activity  When youre not active, youre more likely to develop diabetes, high blood pressure, high cholesterol levels, and excess weight.     Most people with heart disease have more than one risk factor.   Date Last Reviewed:  3/28/2016  © 3879-6895 [employer]. [address] [place] [address], [place] [address]. All rights reserved. This information is not intended as a substitute for professional medical care. Always follow your healthcare professional's instructions.

## 2018-05-28 NOTE — PROGRESS NOTES
Subjective:       Patient ID: Lyndsey Gan is a 52 y.o. female.    Chief Complaint: Follow-up (x 6 months- results)    Patient is known, to me and presents with   Chief Complaint   Patient presents with    Follow-up     x 6 months- results   .  Denies chest pain and shortness of breath.  Patient presents with check up and results. She is doing ok with no complaints today. Refuses all screenings  HPI  Review of Systems   Constitutional: Negative for activity change, appetite change, fatigue, fever and unexpected weight change.   HENT: Negative for congestion, ear discharge, ear pain, hearing loss, postnasal drip and tinnitus.    Eyes: Negative for photophobia, pain and visual disturbance.   Respiratory: Negative for cough, shortness of breath, wheezing and stridor.    Cardiovascular: Negative for chest pain, palpitations and leg swelling.   Gastrointestinal: Negative for abdominal distention.   Genitourinary: Negative for difficulty urinating, dysuria, frequency, hematuria and urgency.   Musculoskeletal: Negative for arthralgias, back pain, gait problem, joint swelling and neck pain.   Skin: Negative.    Neurological: Negative for dizziness, seizures, syncope, weakness, light-headedness, numbness and headaches.   Hematological: Negative for adenopathy. Does not bruise/bleed easily.   Psychiatric/Behavioral: Negative for behavioral problems, confusion, hallucinations, sleep disturbance and suicidal ideas. The patient is not nervous/anxious.        Objective:      Physical Exam   Constitutional: She is oriented to person, place, and time. She appears well-developed and well-nourished. No distress.   HENT:   Head: Normocephalic and atraumatic.   Right Ear: External ear normal.   Left Ear: External ear normal.   Mouth/Throat: Oropharynx is clear and moist. No oropharyngeal exudate.   Eyes: Conjunctivae and EOM are normal. Pupils are equal, round, and reactive to light. Right eye exhibits no discharge. Left eye  exhibits no discharge.   Neck: Normal range of motion. Neck supple. No JVD present. No thyromegaly present.   Cardiovascular: Normal rate, regular rhythm, normal heart sounds and intact distal pulses.  Exam reveals no gallop and no friction rub.    No murmur heard.  Pulmonary/Chest: Effort normal and breath sounds normal. No stridor. No respiratory distress. She has no wheezes. She has no rales. She exhibits no tenderness.   Abdominal: Soft. Bowel sounds are normal. She exhibits no distension and no mass. There is no tenderness. There is no rebound.   Musculoskeletal: Normal range of motion. She exhibits no edema or tenderness.   Lymphadenopathy:     She has no cervical adenopathy.   Neurological: She is alert and oriented to person, place, and time. She has normal reflexes. She displays normal reflexes. No cranial nerve deficit. She exhibits normal muscle tone. Coordination normal.   Skin: Skin is warm and dry. Capillary refill takes less than 2 seconds. No rash noted. No erythema. No pallor.   Psychiatric: She has a normal mood and affect. Her behavior is normal. Judgment and thought content normal.   Negative SI/HI       Assessment:       1. Benign essential HTN    2. Hyperlipidemia LDL goal <70    3. Anxiety    4. Bipolar depression    5. Insomnia, unspecified type    6. Tobacco abuse    7. Tobacco abuse counseling        Plan:   Lyndsey was seen today for follow-up.    Diagnoses and all orders for this visit:    Benign essential HTN  -     metoprolol succinate (TOPROL-XL) 50 MG 24 hr tablet; Take 1 tablet (50 mg total) by mouth once daily.  -     CBC auto differential; Future  -     Comprehensive metabolic panel; Future  -     Microalbumin/creatinine urine ratio; Future    Hyperlipidemia LDL goal <70  -     CBC auto differential; Future  -     Comprehensive metabolic panel; Future  -     TSH; Future  -     Lipid panel; Future    Anxiety  -     ALPRAZolam (XANAX) 1 MG tablet; Take 1 tablet (1 mg total) by mouth  "3 (three) times daily as needed for Anxiety (severe anxiety).  -     ARIPiprazole (ABILIFY) 5 MG Tab; Take 1 tablet (5 mg total) by mouth once daily.  -     CBC auto differential; Future  -     Comprehensive metabolic panel; Future    Bipolar depression  -     ALPRAZolam (XANAX) 1 MG tablet; Take 1 tablet (1 mg total) by mouth 3 (three) times daily as needed for Anxiety (severe anxiety).  -     CBC auto differential; Future  -     Comprehensive metabolic panel; Future    Insomnia, unspecified type  -     traZODone (DESYREL) 150 MG tablet; Take 1 tablet (150 mg total) by mouth nightly.  -     CBC auto differential; Future  -     Comprehensive metabolic panel; Future    Tobacco abuse  -     CBC auto differential; Future  -     Comprehensive metabolic panel; Future    Tobacco abuse counseling    "This note will not be shared with the patient."  Refills on meds.  Medication compliance was discussed with the patient.   Medication side effects were discussed.  The patient was instructed on using exercise frequently to reduce blood pressure.  Thirty to forty-five minutes of brisk walking three to four times a week is often helpful to lower your blood pressure.  Monitor blood pressures at home and to record the values in a log.  The patient was instructed to monitor weight closely and to try to keep it as close to ideal body weight as possible.  Reduce salt intake to less than 2 grams per day.  Do not add salt to food at the table.  Reduce or get rid of salt used in cooking.  Limit processed and fast foods.  Read package labels for amount of salt (soduim) in foods.  Losing weight, even just 10 pounds, of can decrease blood pressure.  Continue with plan of care  rtc as scheduled  "

## 2018-07-10 DIAGNOSIS — F31.9 BIPOLAR DEPRESSION: ICD-10-CM

## 2018-07-10 DIAGNOSIS — F41.9 ANXIETY: ICD-10-CM

## 2018-07-10 RX ORDER — ALPRAZOLAM 1 MG/1
TABLET ORAL
Qty: 90 TABLET | Refills: 2 | Status: SHIPPED | OUTPATIENT
Start: 2018-07-10 | End: 2018-11-20 | Stop reason: SDUPTHER

## 2018-11-14 ENCOUNTER — CLINICAL SUPPORT (OUTPATIENT)
Dept: INTERNAL MEDICINE | Facility: CLINIC | Age: 53
End: 2018-11-14
Payer: COMMERCIAL

## 2018-11-14 DIAGNOSIS — Z72.0 TOBACCO ABUSE: ICD-10-CM

## 2018-11-14 DIAGNOSIS — F31.9 BIPOLAR DEPRESSION: ICD-10-CM

## 2018-11-14 DIAGNOSIS — I10 BENIGN ESSENTIAL HTN: ICD-10-CM

## 2018-11-14 DIAGNOSIS — F41.9 ANXIETY: ICD-10-CM

## 2018-11-14 DIAGNOSIS — G47.00 INSOMNIA, UNSPECIFIED TYPE: ICD-10-CM

## 2018-11-14 DIAGNOSIS — E78.5 HYPERLIPIDEMIA LDL GOAL <70: ICD-10-CM

## 2018-11-14 LAB
ALBUMIN SERPL BCP-MCNC: 4.2 G/DL
ALBUMIN/CREAT UR: 6.1 UG/MG
ALP SERPL-CCNC: 47 U/L
ALT SERPL W/O P-5'-P-CCNC: 17 U/L
ANION GAP SERPL CALC-SCNC: 8 MMOL/L
AST SERPL-CCNC: 20 U/L
BASOPHILS # BLD AUTO: 0.04 K/UL
BASOPHILS NFR BLD: 0.6 %
BILIRUB SERPL-MCNC: 1.1 MG/DL
BUN SERPL-MCNC: 13 MG/DL
CALCIUM SERPL-MCNC: 10 MG/DL
CHLORIDE SERPL-SCNC: 105 MMOL/L
CHOLEST SERPL-MCNC: 197 MG/DL
CHOLEST/HDLC SERPL: 3.8 {RATIO}
CO2 SERPL-SCNC: 28 MMOL/L
CREAT SERPL-MCNC: 0.7 MG/DL
CREAT UR-MCNC: 82 MG/DL
DIFFERENTIAL METHOD: ABNORMAL
EOSINOPHIL # BLD AUTO: 0.1 K/UL
EOSINOPHIL NFR BLD: 1.7 %
ERYTHROCYTE [DISTWIDTH] IN BLOOD BY AUTOMATED COUNT: 11.3 %
EST. GFR  (AFRICAN AMERICAN): >60 ML/MIN/1.73 M^2
EST. GFR  (NON AFRICAN AMERICAN): >60 ML/MIN/1.73 M^2
GLUCOSE SERPL-MCNC: 101 MG/DL
HCT VFR BLD AUTO: 43 %
HDLC SERPL-MCNC: 52 MG/DL
HDLC SERPL: 26.4 %
HGB BLD-MCNC: 14.5 G/DL
IMM GRANULOCYTES # BLD AUTO: 0.01 K/UL
IMM GRANULOCYTES NFR BLD AUTO: 0.1 %
LDLC SERPL CALC-MCNC: 110.2 MG/DL
LYMPHOCYTES # BLD AUTO: 1.6 K/UL
LYMPHOCYTES NFR BLD: 23.6 %
MCH RBC QN AUTO: 32.4 PG
MCHC RBC AUTO-ENTMCNC: 33.7 G/DL
MCV RBC AUTO: 96 FL
MICROALBUMIN UR DL<=1MG/L-MCNC: 5 UG/ML
MONOCYTES # BLD AUTO: 0.7 K/UL
MONOCYTES NFR BLD: 9.4 %
NEUTROPHILS # BLD AUTO: 4.5 K/UL
NEUTROPHILS NFR BLD: 64.6 %
NONHDLC SERPL-MCNC: 145 MG/DL
NRBC BLD-RTO: 0 /100 WBC
PLATELET # BLD AUTO: 165 K/UL
PMV BLD AUTO: 10.7 FL
POTASSIUM SERPL-SCNC: 4.4 MMOL/L
PROT SERPL-MCNC: 7.2 G/DL
RBC # BLD AUTO: 4.48 M/UL
SODIUM SERPL-SCNC: 141 MMOL/L
TRIGL SERPL-MCNC: 174 MG/DL
TSH SERPL DL<=0.005 MIU/L-ACNC: 1.71 UIU/ML
WBC # BLD AUTO: 6.95 K/UL

## 2018-11-14 PROCEDURE — 80053 COMPREHEN METABOLIC PANEL: CPT

## 2018-11-14 PROCEDURE — 82043 UR ALBUMIN QUANTITATIVE: CPT

## 2018-11-14 PROCEDURE — 36415 COLL VENOUS BLD VENIPUNCTURE: CPT | Mod: S$GLB,,, | Performed by: NURSE PRACTITIONER

## 2018-11-14 PROCEDURE — 84443 ASSAY THYROID STIM HORMONE: CPT

## 2018-11-14 PROCEDURE — 85025 COMPLETE CBC W/AUTO DIFF WBC: CPT

## 2018-11-14 PROCEDURE — 80061 LIPID PANEL: CPT

## 2018-11-20 ENCOUNTER — OFFICE VISIT (OUTPATIENT)
Dept: INTERNAL MEDICINE | Facility: CLINIC | Age: 53
End: 2018-11-20
Payer: COMMERCIAL

## 2018-11-20 VITALS
OXYGEN SATURATION: 95 % | SYSTOLIC BLOOD PRESSURE: 114 MMHG | WEIGHT: 141.13 LBS | DIASTOLIC BLOOD PRESSURE: 78 MMHG | HEART RATE: 93 BPM | BODY MASS INDEX: 26.65 KG/M2 | RESPIRATION RATE: 20 BRPM | HEIGHT: 61 IN

## 2018-11-20 DIAGNOSIS — E78.5 HYPERLIPIDEMIA LDL GOAL <70: ICD-10-CM

## 2018-11-20 DIAGNOSIS — Z72.0 TOBACCO ABUSE: ICD-10-CM

## 2018-11-20 DIAGNOSIS — F31.9 BIPOLAR DEPRESSION: ICD-10-CM

## 2018-11-20 DIAGNOSIS — Z71.6 TOBACCO ABUSE COUNSELING: ICD-10-CM

## 2018-11-20 DIAGNOSIS — F41.9 ANXIETY: ICD-10-CM

## 2018-11-20 DIAGNOSIS — I10 BENIGN ESSENTIAL HTN: Primary | ICD-10-CM

## 2018-11-20 DIAGNOSIS — G47.00 INSOMNIA, UNSPECIFIED TYPE: ICD-10-CM

## 2018-11-20 PROCEDURE — 99999 PR PBB SHADOW E&M-EST. PATIENT-LVL IV: CPT | Mod: PBBFAC,,, | Performed by: NURSE PRACTITIONER

## 2018-11-20 PROCEDURE — 3008F BODY MASS INDEX DOCD: CPT | Mod: CPTII,S$GLB,, | Performed by: NURSE PRACTITIONER

## 2018-11-20 PROCEDURE — 99214 OFFICE O/P EST MOD 30 MIN: CPT | Mod: S$GLB,,, | Performed by: NURSE PRACTITIONER

## 2018-11-20 PROCEDURE — 3074F SYST BP LT 130 MM HG: CPT | Mod: CPTII,S$GLB,, | Performed by: NURSE PRACTITIONER

## 2018-11-20 PROCEDURE — 3078F DIAST BP <80 MM HG: CPT | Mod: CPTII,S$GLB,, | Performed by: NURSE PRACTITIONER

## 2018-11-20 RX ORDER — ALPRAZOLAM 1 MG/1
TABLET ORAL
Qty: 90 TABLET | Refills: 5 | Status: SHIPPED | OUTPATIENT
Start: 2018-11-20 | End: 2019-05-28 | Stop reason: SDUPTHER

## 2018-11-20 RX ORDER — BUPROPION HYDROCHLORIDE 150 MG/1
150 TABLET ORAL DAILY
Qty: 30 TABLET | Refills: 0 | Status: SHIPPED | OUTPATIENT
Start: 2018-11-20 | End: 2018-12-17 | Stop reason: SDUPTHER

## 2018-11-20 NOTE — PATIENT INSTRUCTIONS
"  Bipolar Disorder  Bipolar disorder is an illness that causes strong mood swings between depression and louis. It used to be called "manic depression." The mood swings are different from the normal ups and downs we all normally experience in our lives. They are more severe, last longer, and can interfere with work and relationships. These episodes are changes from our usual moods and behavior. Their severity can be mild, or drastic and explosive.  · In a manic episode, you may think fast and do things quickly. It may seem like you are getting a lot done. At first, this may feel very good. But in the extreme this can lead to a lifestyle that is disorganized, chaotic, and includes risky behavior (spending sprees, sexual acting-out, or drug use). In later stages, it may affect eating (no interest in food) and sleeping (unable to sleep for days at a time). Speech may speed up and become difficult for others to understand. You may appear to others as if you are in your own world.  · In a depressive episode, you may feel a lack of interest in normal activities. Sometimes there is sadness or guilt without any clear reason. Thinking may become slow and there can be a lack energy or feeling of hopelessness. Some people have thoughts of harming themselves at this stage. Thoughts can even turn to suicide.  Between these phases you may actually feel OK. This does not mean that the illness is gone. People with this disorder will usually have to treat it all of their life. Medicine and good care can greatly reduce the symptoms.  The exact cause of this illness is unknown. However, there is a genetic link that makes a person more likely to get this problem. Also, the use of drugs such as speed (amphetamine) and cocaine increase the chances of this illness appearing.  Home care  · On-going care and support helps people manage this disease.  Find a healthcare provider and therapist who meet your needs.  Seek help when you feel " like you may be heading into either a manic episode or a depressive state.  · Be sure to take your medicine and get regular blood work to check your the levels of medicine in your body.  Take the medicine and get the follow-up lab work as prescribed, even if you think you dont need to do it.  · Be certain to tell each of your healthcare providers about all of the prescription drugs, over-the-counter medicines, and supplements you take. Certain supplements interact with medicines and result in dangerous side effects.  You can also use your pharmacist as a resource person when you have questions about drug interactions.  · Talk with your family and trusted friends about your thoughts and feelings.  Ask them to help you recognize behavior changes early so you can get help and medicines can be adjusted.  · Alcohol and drugs can bring on an episode, and also make them worse  · If your life is severely impacted by this illness, the Americans with Disabilities Act (ADA) may provide help. The ADA protects people with chronic physical and mental health problems. If you are having trouble keeping jobs, managing workplace issues, or caring for yourself because of your bipolar disorder, contact your local ADA office to see if they can help. The [place] Department  of Justice operates a toll-free ADA information line at: 915.725.7857 (Voice); or 721-937-6122 (TTY).  They can help you locate a local office.    Follow-up care  Follow up with your doctor or therapist as advised. They can help you to find ways to improve your life.  Call 911  Call your healthcare provider right away if any of these occur:  · You have suicidal thoughts, a plan, and the means to  harm yourself  · Trouble breathing  · Confusion  · Drowsiness or trouble wakening  · Fainting or loss of consciousness  · Rapid heart rate, very low heart rate, or a new irregular heart rate  · Seizure  · New chest pain that becomes more severe, lasts longer, or spreads into your  shoulder, arm, neck, jaw, or back  When to seek medical advice  Call your healthcare provider right away if any of these occur:  · Feeling like your symptoms are getting worse (depression, agitation, excess energy)  · Unable to eat or sleep for more than 48 hours  · Feeling out of control (racing thoughts, poor concentration)  · Feeling like you want to harm yourself or another  · Being unable to care for yourself  Date Last Reviewed: 9/29/2015 © 2000-2017 [employer]. [address] [place] [address], [place] [address]. All rights reserved. This information is not intended as a substitute for professional medical care. Always follow your healthcare professional's instructions.

## 2018-11-20 NOTE — PROGRESS NOTES
Subjective:       Patient ID: Lyndsey Gan is a 53 y.o. female.    Chief Complaint: Follow-up (6 months- results)    Patient is known, to me and presents with   Chief Complaint   Patient presents with    Follow-up     6 months- results   .  Denies chest pain and shortness of breath.  Patient is here for check up and labs. Declines all screenings. Declines flu Willing to try wellbutrin along with abilify   HPI  Review of Systems   Constitutional: Negative for activity change, appetite change, fatigue, fever and unexpected weight change.   HENT: Negative for congestion, ear discharge, ear pain, hearing loss, postnasal drip and tinnitus.    Eyes: Negative for photophobia, pain and visual disturbance.   Respiratory: Negative for cough, shortness of breath, wheezing and stridor.    Cardiovascular: Negative for chest pain, palpitations and leg swelling.   Gastrointestinal: Negative for abdominal distention.   Genitourinary: Negative for difficulty urinating, dysuria, frequency, hematuria and urgency.   Musculoskeletal: Negative for arthralgias, back pain, gait problem, joint swelling and neck pain.   Skin: Negative.    Neurological: Negative for dizziness, seizures, syncope, weakness, light-headedness, numbness and headaches.   Hematological: Negative for adenopathy. Does not bruise/bleed easily.   Psychiatric/Behavioral: Positive for dysphoric mood and sleep disturbance. Negative for behavioral problems, confusion, hallucinations and suicidal ideas. The patient is nervous/anxious.        Objective:      Physical Exam   Constitutional: She is oriented to person, place, and time. She appears well-developed and well-nourished. No distress.   HENT:   Head: Normocephalic and atraumatic.   Right Ear: External ear normal.   Left Ear: External ear normal.   Mouth/Throat: Oropharynx is clear and moist. No oropharyngeal exudate.   Eyes: Conjunctivae and EOM are normal. Pupils are equal, round, and reactive to light. Right  eye exhibits no discharge. Left eye exhibits no discharge.   Neck: Normal range of motion. Neck supple. No JVD present. No thyromegaly present.   Cardiovascular: Normal rate, regular rhythm, normal heart sounds and intact distal pulses. Exam reveals no gallop and no friction rub.   No murmur heard.  Pulmonary/Chest: Effort normal and breath sounds normal. No stridor. No respiratory distress. She has no wheezes. She has no rales. She exhibits no tenderness.   Abdominal: Soft. Bowel sounds are normal. She exhibits no distension and no mass. There is no tenderness. There is no rebound.   Musculoskeletal: Normal range of motion. She exhibits no edema or tenderness.   Lymphadenopathy:     She has no cervical adenopathy.   Neurological: She is alert and oriented to person, place, and time. She has normal reflexes. She displays normal reflexes. No cranial nerve deficit. She exhibits normal muscle tone. Coordination normal.   Skin: Skin is warm and dry. Capillary refill takes less than 2 seconds. No rash noted. No erythema. No pallor.   Psychiatric: She has a normal mood and affect. Her behavior is normal. Judgment and thought content normal.   Negative SI/Hi       Assessment:       1. Benign essential HTN    2. Hyperlipidemia LDL goal <70    3. Anxiety    4. Insomnia, unspecified type    5. Bipolar depression    6. Tobacco abuse    7. Tobacco abuse counseling        Plan:   Lyndsey was seen today for follow-up.    Diagnoses and all orders for this visit:    Benign essential HTN  -     CBC auto differential; Future  -     Comprehensive metabolic panel; Future  -     Microalbumin/creatinine urine ratio; Future    Hyperlipidemia LDL goal <70  -     CBC auto differential; Future  -     Comprehensive metabolic panel; Future  -     TSH; Future  -     Lipid panel; Future    Anxiety  -     CBC auto differential; Future  -     Comprehensive metabolic panel; Future  -     buPROPion (WELLBUTRIN XL) 150 MG TB24 tablet; Take 1 tablet  "(150 mg total) by mouth once daily.  -     ALPRAZolam (XANAX) 1 MG tablet; TAKE ONE TABLET BY MOUTH THREE TIMES DAILY AS NEEDED FOR SEVERE ANXIETY    Insomnia, unspecified type  -     CBC auto differential; Future  -     Comprehensive metabolic panel; Future    Bipolar depression  -     CBC auto differential; Future  -     Comprehensive metabolic panel; Future  -     ALPRAZolam (XANAX) 1 MG tablet; TAKE ONE TABLET BY MOUTH THREE TIMES DAILY AS NEEDED FOR SEVERE ANXIETY    Tobacco abuse  -     CBC auto differential; Future  -     Comprehensive metabolic panel; Future    Tobacco abuse counseling    "This note will not be shared with the patient."  If any thoughts of suicide or ho icide occur stop wellbutrin and go to ER  I've explained to her that drugs of the SSRI class can have side effects such as weight gain, sexual dysfunction, insomnia, headache, nausea. These medications are generally effective at alleviating symptoms of anxiety and/or depression. Let me know if significant side effects do occur.  Will call in two weeks to let me know how she is doing  Lab drawn today CBC, CMP, TSH, FLP  Limit the cholesterol in your diet to less than 300 mg per day.  Fats should contribute no more than 20 to 35% of your daily calories.  Less than 7 to 10% of your calories should come from saturated fat.  Avoid saturated fat products e.g., butter, some oils, meat, and poultry fat contain a lot of saturated fat.  Check food labels for fat and cholesterol content. Choose the foods with less fat per serving.  Limit the amount of butter and margarine you eat.  Use salad dressings and margarine made with polyunsaturated and monunsaturated fats.  Use egg whites or egg substitutes rather than whole eggs.  Replace whole-milk dairy products with nonfat or low-fat mild, cheese, spreads, and yogurt.  Eat skinless chicken, turkey, fish, and meatless entrees more often than red meat.  Choose lean cuts of meat and trim off all visible fat. " Keep portion sizes moderate.  Avoid fatty desserts such as ice cream, cream-filled cakes, and cheesecakes. Choose fresh fruits, nonfat frozen yogurt, Popsicles, etc.  Reduce the amount of fried foods, vending machine food, and fast food you eat.  Eat fruits and vegetables (especially fresh fruits and leafy vegetables), beans, and whole grains daily. The fiber in these foods helps lower cholesterol.  Look for low-fat or nonfat varieties of the foods you like to eat or look for substitutes.  You may need to exercise 60 minutes a day to prevent weight gain and 90 minutes a day to lose weight.  RTC in six months.

## 2018-11-23 DIAGNOSIS — F41.9 ANXIETY: ICD-10-CM

## 2018-11-25 RX ORDER — ARIPIPRAZOLE 5 MG/1
TABLET ORAL
Qty: 90 TABLET | Refills: 1 | Status: SHIPPED | OUTPATIENT
Start: 2018-11-25 | End: 2019-05-23 | Stop reason: SDUPTHER

## 2018-12-07 DIAGNOSIS — G47.00 INSOMNIA, UNSPECIFIED TYPE: ICD-10-CM

## 2018-12-07 RX ORDER — TRAZODONE HYDROCHLORIDE 150 MG/1
TABLET ORAL
Qty: 90 TABLET | Refills: 1 | Status: SHIPPED | OUTPATIENT
Start: 2018-12-07 | End: 2019-05-28 | Stop reason: SDUPTHER

## 2018-12-17 ENCOUNTER — TELEPHONE (OUTPATIENT)
Dept: INTERNAL MEDICINE | Facility: CLINIC | Age: 53
End: 2018-12-17

## 2018-12-17 DIAGNOSIS — F41.9 ANXIETY: ICD-10-CM

## 2018-12-17 RX ORDER — BUPROPION HYDROCHLORIDE 150 MG/1
150 TABLET ORAL DAILY
Qty: 30 TABLET | Refills: 5 | Status: SHIPPED | OUTPATIENT
Start: 2018-12-17 | End: 2019-05-28

## 2019-01-04 DIAGNOSIS — Z12.11 COLON CANCER SCREENING: ICD-10-CM

## 2019-02-26 DIAGNOSIS — I10 BENIGN ESSENTIAL HTN: ICD-10-CM

## 2019-02-27 RX ORDER — METOPROLOL SUCCINATE 50 MG/1
TABLET, EXTENDED RELEASE ORAL
Qty: 90 TABLET | Refills: 1 | Status: SHIPPED | OUTPATIENT
Start: 2019-02-27 | End: 2019-08-26 | Stop reason: SDUPTHER

## 2019-05-23 ENCOUNTER — CLINICAL SUPPORT (OUTPATIENT)
Dept: INTERNAL MEDICINE | Facility: CLINIC | Age: 54
End: 2019-05-23
Payer: COMMERCIAL

## 2019-05-23 DIAGNOSIS — I10 BENIGN ESSENTIAL HTN: ICD-10-CM

## 2019-05-23 DIAGNOSIS — F41.9 ANXIETY: ICD-10-CM

## 2019-05-23 DIAGNOSIS — G47.00 INSOMNIA, UNSPECIFIED TYPE: ICD-10-CM

## 2019-05-23 DIAGNOSIS — F31.9 BIPOLAR DEPRESSION: ICD-10-CM

## 2019-05-23 DIAGNOSIS — E78.5 HYPERLIPIDEMIA LDL GOAL <70: ICD-10-CM

## 2019-05-23 DIAGNOSIS — Z72.0 TOBACCO ABUSE: ICD-10-CM

## 2019-05-23 LAB
ALBUMIN SERPL BCP-MCNC: 4.2 G/DL (ref 3.5–5.2)
ALBUMIN/CREAT UR: 6.8 UG/MG (ref 0–30)
ALP SERPL-CCNC: 58 U/L (ref 55–135)
ALT SERPL W/O P-5'-P-CCNC: 22 U/L (ref 10–44)
ANION GAP SERPL CALC-SCNC: 7 MMOL/L (ref 8–16)
AST SERPL-CCNC: 28 U/L (ref 10–40)
BASOPHILS # BLD AUTO: 0.03 K/UL (ref 0–0.2)
BASOPHILS NFR BLD: 0.6 % (ref 0–1.9)
BILIRUB SERPL-MCNC: 0.5 MG/DL (ref 0.1–1)
BUN SERPL-MCNC: 9 MG/DL (ref 6–20)
CALCIUM SERPL-MCNC: 10.2 MG/DL (ref 8.7–10.5)
CHLORIDE SERPL-SCNC: 102 MMOL/L (ref 95–110)
CHOLEST SERPL-MCNC: 199 MG/DL (ref 120–199)
CHOLEST/HDLC SERPL: 4.1 {RATIO} (ref 2–5)
CO2 SERPL-SCNC: 29 MMOL/L (ref 23–29)
CREAT SERPL-MCNC: 0.7 MG/DL (ref 0.5–1.4)
CREAT UR-MCNC: 59 MG/DL (ref 15–325)
DIFFERENTIAL METHOD: ABNORMAL
EOSINOPHIL # BLD AUTO: 0.1 K/UL (ref 0–0.5)
EOSINOPHIL NFR BLD: 1.9 % (ref 0–8)
ERYTHROCYTE [DISTWIDTH] IN BLOOD BY AUTOMATED COUNT: 11.4 % (ref 11.5–14.5)
EST. GFR  (AFRICAN AMERICAN): >60 ML/MIN/1.73 M^2
EST. GFR  (NON AFRICAN AMERICAN): >60 ML/MIN/1.73 M^2
GLUCOSE SERPL-MCNC: 101 MG/DL (ref 70–110)
HCT VFR BLD AUTO: 44.4 % (ref 37–48.5)
HDLC SERPL-MCNC: 48 MG/DL (ref 40–75)
HDLC SERPL: 24.1 % (ref 20–50)
HGB BLD-MCNC: 15.1 G/DL (ref 12–16)
IMM GRANULOCYTES # BLD AUTO: 0.01 K/UL (ref 0–0.04)
IMM GRANULOCYTES NFR BLD AUTO: 0.2 % (ref 0–0.5)
LDLC SERPL CALC-MCNC: 118.6 MG/DL (ref 63–159)
LYMPHOCYTES # BLD AUTO: 1.3 K/UL (ref 1–4.8)
LYMPHOCYTES NFR BLD: 25.2 % (ref 18–48)
MCH RBC QN AUTO: 32.7 PG (ref 27–31)
MCHC RBC AUTO-ENTMCNC: 34 G/DL (ref 32–36)
MCV RBC AUTO: 96 FL (ref 82–98)
MICROALBUMIN UR DL<=1MG/L-MCNC: 4 UG/ML
MONOCYTES # BLD AUTO: 0.6 K/UL (ref 0.3–1)
MONOCYTES NFR BLD: 11.7 % (ref 4–15)
NEUTROPHILS # BLD AUTO: 3.2 K/UL (ref 1.8–7.7)
NEUTROPHILS NFR BLD: 60.4 % (ref 38–73)
NONHDLC SERPL-MCNC: 151 MG/DL
NRBC BLD-RTO: 0 /100 WBC
PLATELET # BLD AUTO: 171 K/UL (ref 150–350)
PMV BLD AUTO: 10.4 FL (ref 9.2–12.9)
POTASSIUM SERPL-SCNC: 4.4 MMOL/L (ref 3.5–5.1)
PROT SERPL-MCNC: 7.5 G/DL (ref 6–8.4)
RBC # BLD AUTO: 4.62 M/UL (ref 4–5.4)
SODIUM SERPL-SCNC: 138 MMOL/L (ref 136–145)
TRIGL SERPL-MCNC: 162 MG/DL (ref 30–150)
TSH SERPL DL<=0.005 MIU/L-ACNC: 1.12 UIU/ML (ref 0.4–4)
WBC # BLD AUTO: 5.28 K/UL (ref 3.9–12.7)

## 2019-05-23 PROCEDURE — 80061 LIPID PANEL: CPT

## 2019-05-23 PROCEDURE — 36415 COLL VENOUS BLD VENIPUNCTURE: CPT

## 2019-05-23 PROCEDURE — 82043 UR ALBUMIN QUANTITATIVE: CPT

## 2019-05-23 PROCEDURE — 84443 ASSAY THYROID STIM HORMONE: CPT

## 2019-05-23 PROCEDURE — 36415 COLL VENOUS BLD VENIPUNCTURE: CPT | Mod: S$GLB,,, | Performed by: NURSE PRACTITIONER

## 2019-05-23 PROCEDURE — 85025 COMPLETE CBC W/AUTO DIFF WBC: CPT

## 2019-05-23 PROCEDURE — 36415 PR COLLECTION VENOUS BLOOD,VENIPUNCTURE: ICD-10-PCS | Mod: S$GLB,,, | Performed by: NURSE PRACTITIONER

## 2019-05-23 PROCEDURE — 80053 COMPREHEN METABOLIC PANEL: CPT

## 2019-05-24 RX ORDER — ARIPIPRAZOLE 5 MG/1
TABLET ORAL
Qty: 90 TABLET | Refills: 1 | Status: SHIPPED | OUTPATIENT
Start: 2019-05-24 | End: 2019-05-28 | Stop reason: SDUPTHER

## 2019-05-28 ENCOUNTER — OFFICE VISIT (OUTPATIENT)
Dept: INTERNAL MEDICINE | Facility: CLINIC | Age: 54
End: 2019-05-28
Payer: COMMERCIAL

## 2019-05-28 VITALS
RESPIRATION RATE: 18 BRPM | HEIGHT: 61 IN | SYSTOLIC BLOOD PRESSURE: 124 MMHG | WEIGHT: 141 LBS | OXYGEN SATURATION: 96 % | BODY MASS INDEX: 26.62 KG/M2 | HEART RATE: 88 BPM | DIASTOLIC BLOOD PRESSURE: 80 MMHG

## 2019-05-28 DIAGNOSIS — Z72.0 TOBACCO ABUSE: ICD-10-CM

## 2019-05-28 DIAGNOSIS — F41.9 ANXIETY: ICD-10-CM

## 2019-05-28 DIAGNOSIS — G47.00 INSOMNIA, UNSPECIFIED TYPE: ICD-10-CM

## 2019-05-28 DIAGNOSIS — F31.9 BIPOLAR DEPRESSION: ICD-10-CM

## 2019-05-28 DIAGNOSIS — Z71.6 TOBACCO ABUSE COUNSELING: ICD-10-CM

## 2019-05-28 DIAGNOSIS — I10 BENIGN ESSENTIAL HTN: Primary | ICD-10-CM

## 2019-05-28 DIAGNOSIS — E78.5 HYPERLIPIDEMIA LDL GOAL <70: ICD-10-CM

## 2019-05-28 PROCEDURE — 99214 OFFICE O/P EST MOD 30 MIN: CPT | Mod: S$GLB,,, | Performed by: NURSE PRACTITIONER

## 2019-05-28 PROCEDURE — 3079F PR MOST RECENT DIASTOLIC BLOOD PRESSURE 80-89 MM HG: ICD-10-PCS | Mod: CPTII,S$GLB,, | Performed by: NURSE PRACTITIONER

## 2019-05-28 PROCEDURE — 99214 PR OFFICE/OUTPT VISIT, EST, LEVL IV, 30-39 MIN: ICD-10-PCS | Mod: S$GLB,,, | Performed by: NURSE PRACTITIONER

## 2019-05-28 PROCEDURE — 99999 PR PBB SHADOW E&M-EST. PATIENT-LVL III: ICD-10-PCS | Mod: PBBFAC,,, | Performed by: NURSE PRACTITIONER

## 2019-05-28 PROCEDURE — 3079F DIAST BP 80-89 MM HG: CPT | Mod: CPTII,S$GLB,, | Performed by: NURSE PRACTITIONER

## 2019-05-28 PROCEDURE — 3074F SYST BP LT 130 MM HG: CPT | Mod: CPTII,S$GLB,, | Performed by: NURSE PRACTITIONER

## 2019-05-28 PROCEDURE — 99999 PR PBB SHADOW E&M-EST. PATIENT-LVL III: CPT | Mod: PBBFAC,,, | Performed by: NURSE PRACTITIONER

## 2019-05-28 PROCEDURE — 3008F BODY MASS INDEX DOCD: CPT | Mod: CPTII,S$GLB,, | Performed by: NURSE PRACTITIONER

## 2019-05-28 PROCEDURE — 3074F PR MOST RECENT SYSTOLIC BLOOD PRESSURE < 130 MM HG: ICD-10-PCS | Mod: CPTII,S$GLB,, | Performed by: NURSE PRACTITIONER

## 2019-05-28 PROCEDURE — 3008F PR BODY MASS INDEX (BMI) DOCUMENTED: ICD-10-PCS | Mod: CPTII,S$GLB,, | Performed by: NURSE PRACTITIONER

## 2019-05-28 RX ORDER — TRAZODONE HYDROCHLORIDE 150 MG/1
150 TABLET ORAL NIGHTLY
Qty: 90 TABLET | Refills: 1 | Status: SHIPPED | OUTPATIENT
Start: 2019-05-28 | End: 2019-11-26

## 2019-05-28 RX ORDER — ARIPIPRAZOLE 5 MG/1
5 TABLET ORAL DAILY
Qty: 90 TABLET | Refills: 1 | Status: SHIPPED | OUTPATIENT
Start: 2019-05-28 | End: 2020-08-25 | Stop reason: SDUPTHER

## 2019-05-28 RX ORDER — ALPRAZOLAM 1 MG/1
TABLET ORAL
Qty: 90 TABLET | Refills: 2 | Status: SHIPPED | OUTPATIENT
Start: 2019-05-28 | End: 2019-08-15 | Stop reason: SDUPTHER

## 2019-05-28 NOTE — PATIENT INSTRUCTIONS
Anxiety Reaction  Anxiety is the feeling we all get when we think something bad might happen. It is a normal response to stress and usually causes only a mild reaction. When anxiety becomes more severe, it can interfere with daily life. In some cases, you may not even be aware of what it is youre anxious about. There may also be a genetic link or it may be a learned behavior in the home.  Both psychological and physical triggers cause stress reaction. It's often a response to fear or emotional stress, real or imagined. This stress may come from home, family, work, or social relationships.  During an anxiety reaction, you may feel:  · Helpless  · Nervous  · Depressed  · Irritable  Your body may show signs of anxiety in many ways. You may experience:  · Dry mouth  · Shakiness  · Dizziness  · Weakness  · Trouble breathing  · Breathing fast (hyperventilating)  · Chest pressure  · Sweating  · Headache  · Nausea  · Diarrhea  · Tiredness  · Inability to sleep  · Sexual problems  Home care  · Try to locate the sources of stress in your life. They may not be obvious. These may include:  ¨ Daily hassles of life (traffic jams, missed appointments, car troubles, etc.)  ¨ Major life changes, both good (new baby, job promotion) and bad (loss of job, loss of loved one)  ¨ Overload: feeling that you have too many responsibilities and can't take care of all of them at once  ¨ Feeling helpless, feeling that your problems are beyond what youre able to solve  · Notice how your body reacts to stress. Learn to listen to your body signals. This will help you take action before the stress becomes severe.  · When you can, do something about the source of your stress. (Avoid hassles, limit the amount of change that happens in your life at one time and take a break when you feel overloaded).  · Unfortunately, many stressful situations can't be avoided. It is necessary to learn how to better manage stress. There are many proven methods  that will reduce your anxiety. These include simple things like exercise, good nutrition and adequate rest. Also, there are certain techniques that are helpful:  ¨ Relaxation  ¨ Breathing exercises  ¨ Visualization  ¨ Biofeedback  ¨ Meditation  For more information about this, consult your doctor or go to a local bookstore and review the many books and tapes available on this subject.  Follow-up care  If you feel that your anxiety is not responding to self-help measures, contact your doctor or make an appointment with a counselor. You may need short-term psychological counseling and temporary medicine to help you manage stress.  Call 911  Call your healthcare provider right away if any of these occur:  · Trouble breathing  · Confusion  · Drowsiness or trouble wakening  · Fainting or loss of consciousness  · Rapid heart rate  · Seizure  · New chest pain that becomes more severe, lasts longer, or spreads into your shoulder, arm, neck, jaw, or back  When to seek medical advice  Call your healthcare provider right away if any of these occur:  · Your symptoms get worse  · Severe headache not relieved by rest and mild pain reliever  Date Last Reviewed: 9/29/2015  © 8969-3285 [employer]. [address] [place] [id number]. All rights reserved. This information is not intended as a substitute for professional medical care. Always follow your healthcare professional's instructions.

## 2019-05-28 NOTE — PROGRESS NOTES
Subjective:       Patient ID: Lyndsey Gan is a 53 y.o. female.    Chief Complaint: Follow-up (6 month F/U)    Patient is known, to me and presents with   Chief Complaint   Patient presents with    Follow-up     6 month F/U   .  Denies chest pain and shortness of breath.  Patient presents with check up and labs. Declines screenings at this time. Doing well otherwise  HPI  Review of Systems   Constitutional: Negative for activity change, appetite change, fatigue, fever and unexpected weight change.   HENT: Negative for congestion, ear discharge, ear pain, hearing loss, postnasal drip and tinnitus.    Eyes: Negative for photophobia, pain and visual disturbance.   Respiratory: Negative for cough, shortness of breath, wheezing and stridor.    Cardiovascular: Negative for chest pain, palpitations and leg swelling.   Gastrointestinal: Negative for abdominal distention.   Genitourinary: Negative for difficulty urinating, dysuria, frequency, hematuria and urgency.   Musculoskeletal: Negative for arthralgias, back pain, gait problem, joint swelling and neck pain.   Skin: Negative.    Neurological: Negative for dizziness, seizures, syncope, weakness, light-headedness, numbness and headaches.   Hematological: Negative for adenopathy. Does not bruise/bleed easily.   Psychiatric/Behavioral: Negative for behavioral problems, confusion, dysphoric mood, hallucinations, sleep disturbance and suicidal ideas. The patient is not nervous/anxious.        Objective:      Physical Exam   Constitutional: She is oriented to person, place, and time. She appears well-developed and well-nourished. No distress.   HENT:   Head: Normocephalic and atraumatic.   Right Ear: External ear normal.   Left Ear: External ear normal.   Mouth/Throat: Oropharynx is clear and moist. No oropharyngeal exudate.   Eyes: Pupils are equal, round, and reactive to light. Conjunctivae and EOM are normal. Right eye exhibits no discharge. Left eye exhibits no  discharge.   Neck: Normal range of motion. Neck supple. No JVD present. No thyromegaly present.   Cardiovascular: Normal rate, regular rhythm, normal heart sounds and intact distal pulses. Exam reveals no gallop and no friction rub.   No murmur heard.  Pulmonary/Chest: Effort normal and breath sounds normal. No stridor. No respiratory distress. She has no wheezes. She has no rales. She exhibits no tenderness.   Abdominal: Soft. Bowel sounds are normal. She exhibits no distension and no mass. There is no tenderness. There is no rebound.   Musculoskeletal: Normal range of motion. She exhibits no edema or tenderness.   Lymphadenopathy:     She has no cervical adenopathy.   Neurological: She is alert and oriented to person, place, and time. She has normal reflexes. She displays normal reflexes. No cranial nerve deficit. She exhibits normal muscle tone. Coordination normal.   Skin: Skin is warm and dry. Capillary refill takes less than 2 seconds. No rash noted. No erythema. No pallor.   Psychiatric: She has a normal mood and affect. Her behavior is normal. Judgment and thought content normal.   Negative SI/HI noted       Assessment:       1. Benign essential HTN    2. Hyperlipidemia LDL goal <70    3. Insomnia, unspecified type    4. Anxiety    5. Bipolar depression    6. Tobacco abuse    7. Tobacco abuse counseling        Plan:   Lyndsey was seen today for follow-up.    Diagnoses and all orders for this visit:    Benign essential HTN  -     CBC auto differential; Future  -     Comprehensive metabolic panel; Future  -     Microalbumin/creatinine urine ratio; Future    Hyperlipidemia LDL goal <70  -     CBC auto differential; Future  -     Comprehensive metabolic panel; Future  -     TSH; Future  -     Lipid panel; Future    Insomnia, unspecified type  -     CBC auto differential; Future  -     Comprehensive metabolic panel; Future  -     traZODone (DESYREL) 150 MG tablet; Take 1 tablet (150 mg total) by mouth  "nightly.    Anxiety  -     CBC auto differential; Future  -     Comprehensive metabolic panel; Future  -     ARIPiprazole (ABILIFY) 5 MG Tab; Take 1 tablet (5 mg total) by mouth once daily.    Bipolar depression  -     CBC auto differential; Future  -     Comprehensive metabolic panel; Future    Tobacco abuse  -     CBC auto differential; Future  -     Comprehensive metabolic panel; Future    Tobacco abuse counseling    "This note will not be shared with the patient."  Lab drawn today CBC, CMP, TSH, FLP  Limit the cholesterol in your diet to less than 300 mg per day.  Fats should contribute no more than 20 to 35% of your daily calories.  Less than 7 to 10% of your calories should come from saturated fat.  Avoid saturated fat products e.g., butter, some oils, meat, and poultry fat contain a lot of saturated fat.  Check food labels for fat and cholesterol content. Choose the foods with less fat per serving.  Limit the amount of butter and margarine you eat.  Use salad dressings and margarine made with polyunsaturated and monunsaturated fats.  Use egg whites or egg substitutes rather than whole eggs.  Replace whole-milk dairy products with nonfat or low-fat mild, cheese, spreads, and yogurt.  Eat skinless chicken, turkey, fish, and meatless entrees more often than red meat.  Choose lean cuts of meat and trim off all visible fat. Keep portion sizes moderate.  Avoid fatty desserts such as ice cream, cream-filled cakes, and cheesecakes. Choose fresh fruits, nonfat frozen yogurt, Popsicles, etc.  Reduce the amount of fried foods, vending machine food, and fast food you eat.  Eat fruits and vegetables (especially fresh fruits and leafy vegetables), beans, and whole grains daily. The fiber in these foods helps lower cholesterol.  Look for low-fat or nonfat varieties of the foods you like to eat or look for substitutes.  You may need to exercise 60 minutes a day to prevent weight gain and 90 minutes a day to lose weight.  RTC " in six months.

## 2019-06-13 ENCOUNTER — HOSPITAL ENCOUNTER (OUTPATIENT)
Dept: RADIOLOGY | Facility: HOSPITAL | Age: 54
Discharge: HOME | End: 2019-06-13
Attending: ADVANCED PRACTICE MIDWIFE
Payer: COMMERCIAL

## 2019-06-13 ENCOUNTER — OFFICE VISIT (OUTPATIENT)
Dept: OBSTETRICS AND GYNECOLOGY | Facility: CLINIC | Age: 54
End: 2019-06-13
Payer: COMMERCIAL

## 2019-06-13 VITALS
DIASTOLIC BLOOD PRESSURE: 82 MMHG | RESPIRATION RATE: 15 BRPM | SYSTOLIC BLOOD PRESSURE: 132 MMHG | BODY MASS INDEX: 27.19 KG/M2 | HEIGHT: 61 IN | WEIGHT: 144 LBS | HEART RATE: 86 BPM

## 2019-06-13 DIAGNOSIS — Z12.39 BREAST CANCER SCREENING OTHER THAN MAMMOGRAM: ICD-10-CM

## 2019-06-13 DIAGNOSIS — Z01.419 WELL WOMAN EXAM WITH ROUTINE GYNECOLOGICAL EXAM: ICD-10-CM

## 2019-06-13 DIAGNOSIS — N94.10 DYSPAREUNIA IN FEMALE: ICD-10-CM

## 2019-06-13 DIAGNOSIS — Z12.4 CERVICAL CANCER SCREENING: Primary | ICD-10-CM

## 2019-06-13 DIAGNOSIS — N95.2 VAGINAL ATROPHY: ICD-10-CM

## 2019-06-13 PROCEDURE — 99386 PR PREVENTIVE VISIT,NEW,40-64: ICD-10-PCS | Mod: S$GLB,,, | Performed by: ADVANCED PRACTICE MIDWIFE

## 2019-06-13 PROCEDURE — 3008F PR BODY MASS INDEX (BMI) DOCUMENTED: ICD-10-PCS | Mod: CPTII,S$GLB,, | Performed by: ADVANCED PRACTICE MIDWIFE

## 2019-06-13 PROCEDURE — 77063 BREAST TOMOSYNTHESIS BI: CPT | Mod: 26,,, | Performed by: RADIOLOGY

## 2019-06-13 PROCEDURE — 99999 PR PBB SHADOW E&M-EST. PATIENT-LVL III: CPT | Mod: PBBFAC,,, | Performed by: ADVANCED PRACTICE MIDWIFE

## 2019-06-13 PROCEDURE — 88175 CYTOPATH C/V AUTO FLUID REDO: CPT

## 2019-06-13 PROCEDURE — 87624 HPV HI-RISK TYP POOLED RSLT: CPT

## 2019-06-13 PROCEDURE — 3008F BODY MASS INDEX DOCD: CPT | Mod: CPTII,S$GLB,, | Performed by: ADVANCED PRACTICE MIDWIFE

## 2019-06-13 PROCEDURE — 99999 PR PBB SHADOW E&M-EST. PATIENT-LVL III: ICD-10-PCS | Mod: PBBFAC,,, | Performed by: ADVANCED PRACTICE MIDWIFE

## 2019-06-13 PROCEDURE — 99386 PREV VISIT NEW AGE 40-64: CPT | Mod: S$GLB,,, | Performed by: ADVANCED PRACTICE MIDWIFE

## 2019-06-13 PROCEDURE — 77063 MAMMO DIGITAL SCREENING BILAT WITH TOMOSYNTHESIS_CAD: ICD-10-PCS | Mod: 26,,, | Performed by: RADIOLOGY

## 2019-06-13 PROCEDURE — 3075F SYST BP GE 130 - 139MM HG: CPT | Mod: CPTII,S$GLB,, | Performed by: ADVANCED PRACTICE MIDWIFE

## 2019-06-13 PROCEDURE — 77067 SCR MAMMO BI INCL CAD: CPT | Mod: 26,,, | Performed by: RADIOLOGY

## 2019-06-13 PROCEDURE — 3075F PR MOST RECENT SYSTOLIC BLOOD PRESS GE 130-139MM HG: ICD-10-PCS | Mod: CPTII,S$GLB,, | Performed by: ADVANCED PRACTICE MIDWIFE

## 2019-06-13 PROCEDURE — 3079F DIAST BP 80-89 MM HG: CPT | Mod: CPTII,S$GLB,, | Performed by: ADVANCED PRACTICE MIDWIFE

## 2019-06-13 PROCEDURE — 77067 SCR MAMMO BI INCL CAD: CPT | Mod: TC

## 2019-06-13 PROCEDURE — 3079F PR MOST RECENT DIASTOLIC BLOOD PRESSURE 80-89 MM HG: ICD-10-PCS | Mod: CPTII,S$GLB,, | Performed by: ADVANCED PRACTICE MIDWIFE

## 2019-06-13 PROCEDURE — 77067 MAMMO DIGITAL SCREENING BILAT WITH TOMOSYNTHESIS_CAD: ICD-10-PCS | Mod: 26,,, | Performed by: RADIOLOGY

## 2019-06-13 NOTE — PROGRESS NOTES
"  Subjective:    Patient ID: Lyndsey Gan is a 53 y.o. y.o. female.     Chief Complaint: Annual Well Woman Exam     History of Present Illness:  Lyndsey presents today for Annual Well Woman exam. She describes her menses as absent d/t para cervical hysterctomy unsure of dates .She denies pelvic pain.  She denies breast tenderness, masses, nipple discharge. She denies difficulty with urination or bowel movements. She admits to menopausal symptoms such as hotflashes, vaginal dryness, and night sweats. She denies bloating, early satiety, or weight changes. She is sexually active. Contraception is by no method. Has not had a colonoscopy and has a fit kit ordered. Discussed importance of the screening versus the fit kit. Discussed vaginal tx and pt declined.     The following portions of the patient's history were reviewed and updated as appropriate: allergies, current medications, past family history, past medical history, past social history, past surgical history and problem list.      Menstrual History:   No LMP recorded. Patient has had a hysterectomy.. But, still has cervix     OB History        1    Para   1    Term   1            AB        Living   1       SAB        TAB        Ectopic        Multiple        Live Births   1                 The following portions of the patient's history were reviewed and updated as appropriate: allergies, current medications, past family history, past medical history, past social history, past surgical history and problem list.        ROS:     Review of Systems   Genitourinary: Positive for dyspareunia.   Musculoskeletal: Positive for back pain and myalgias.   Psychiatric/Behavioral: Positive for depression. The patient is nervous/anxious.        Objective:    Vital Signs:  Vitals:    19 1012   BP: 132/82   Pulse: 86   Resp: 15   Weight: 65.3 kg (144 lb)   Height: 5' 1" (1.549 m)         Physical Exam:  General:  alert, cooperative, appears stated age "   Skin:  Skin color, texture, turgor normal. No rashes or lesions   HEENT:  conjunctivae/corneas clear. PERRL.   Neck: supple, trachea midline, no adenopathy or thyromegally   Respiratory:  clear to auscultation bilaterally   Heart:  regular rate and rhythm, S1, S2 normal, no murmur, click, rub or gallop   Breasts:  no discharge, erythema, or tenderness, self-exam is taught and encouraged, mammography schedule is discussed   Abdomen:  normal findings: bowel sounds normal, no masses palpable, no organomegaly and soft, non-tender   Pelvis: External genitalia: normal general appearance  Urinary system: urethral meatus normal, bladder nontender  Vaginal: normal mucosa without prolapse or lesions  Cervix: normal appearance  Uterus: absent  Adnexa: normal size, nontender bilaterally   Extremities: Normal ROM; no edema, no cyanosis   Neurologial: Normal strength and tone. No focal numbness or weakness. Reflexes 2+ and equal.   Psychiatric: normal mood, speech, dress, and thought processes         Assessment:       Healthy female exam.     1. Cervical cancer screening    2. Well woman exam with routine gynecological exam    3. Breast cancer screening other than mammogram    4. Vaginal atrophy    5. Dyspareunia in female          Plan:       All questions answered.  Await pap smear results.  Breast self exam technique reviewed and patient encouraged to perform self-exam monthly.  Follow up in 1 year.  Follow up as needed.  Mammogram.  Pap smear.     COUNSELING:  Lyndsey was counseled on use and side-effects of various contraceptive measures, A.C.O.G. Pap guidelines and recommendations for yearly pelvic exams in addition to recommendations for monthly self breast exams; to see her PCP for other health maintenance.

## 2019-06-19 LAB
HPV HR 12 DNA CVX QL NAA+PROBE: NEGATIVE
HPV16 AG SPEC QL: NEGATIVE
HPV18 DNA SPEC QL NAA+PROBE: NEGATIVE

## 2019-08-14 ENCOUNTER — TELEPHONE (OUTPATIENT)
Dept: INTERNAL MEDICINE | Facility: CLINIC | Age: 54
End: 2019-08-14

## 2019-08-14 DIAGNOSIS — F41.9 ANXIETY: ICD-10-CM

## 2019-08-14 DIAGNOSIS — F31.9 BIPOLAR DEPRESSION: ICD-10-CM

## 2019-08-15 RX ORDER — ALPRAZOLAM 1 MG/1
TABLET ORAL
Qty: 90 TABLET | Refills: 2 | Status: SHIPPED | OUTPATIENT
Start: 2019-08-15 | End: 2019-08-27 | Stop reason: SDUPTHER

## 2019-08-26 DIAGNOSIS — I10 BENIGN ESSENTIAL HTN: ICD-10-CM

## 2019-08-26 RX ORDER — METOPROLOL SUCCINATE 50 MG/1
TABLET, EXTENDED RELEASE ORAL
Qty: 90 TABLET | Refills: 4 | Status: SHIPPED | OUTPATIENT
Start: 2019-08-26 | End: 2019-11-26 | Stop reason: SDUPTHER

## 2019-08-27 DIAGNOSIS — F41.9 ANXIETY: ICD-10-CM

## 2019-08-27 DIAGNOSIS — F31.9 BIPOLAR DEPRESSION: ICD-10-CM

## 2019-08-27 RX ORDER — ALPRAZOLAM 1 MG/1
TABLET ORAL
Qty: 90 TABLET | Refills: 2 | Status: SHIPPED | OUTPATIENT
Start: 2019-08-27 | End: 2020-06-04 | Stop reason: SDUPTHER

## 2019-11-19 DIAGNOSIS — F41.9 ANXIETY: ICD-10-CM

## 2019-11-21 RX ORDER — ARIPIPRAZOLE 5 MG/1
TABLET ORAL
Qty: 90 TABLET | Refills: 4 | Status: SHIPPED | OUTPATIENT
Start: 2019-11-21 | End: 2019-11-26 | Stop reason: SDUPTHER

## 2019-11-22 ENCOUNTER — CLINICAL SUPPORT (OUTPATIENT)
Dept: INTERNAL MEDICINE | Facility: CLINIC | Age: 54
End: 2019-11-22
Payer: COMMERCIAL

## 2019-11-22 DIAGNOSIS — F41.9 ANXIETY: ICD-10-CM

## 2019-11-22 DIAGNOSIS — F31.9 BIPOLAR DEPRESSION: ICD-10-CM

## 2019-11-22 DIAGNOSIS — E78.5 HYPERLIPIDEMIA LDL GOAL <70: ICD-10-CM

## 2019-11-22 DIAGNOSIS — Z72.0 TOBACCO ABUSE: ICD-10-CM

## 2019-11-22 DIAGNOSIS — G47.00 INSOMNIA, UNSPECIFIED TYPE: ICD-10-CM

## 2019-11-22 DIAGNOSIS — I10 BENIGN ESSENTIAL HTN: ICD-10-CM

## 2019-11-22 LAB
ALBUMIN SERPL BCP-MCNC: 3.9 G/DL (ref 3.5–5.2)
ALBUMIN/CREAT UR: 8 UG/MG (ref 0–30)
ALP SERPL-CCNC: 52 U/L (ref 55–135)
ALT SERPL W/O P-5'-P-CCNC: 17 U/L (ref 10–44)
ANION GAP SERPL CALC-SCNC: 9 MMOL/L (ref 8–16)
AST SERPL-CCNC: 21 U/L (ref 10–40)
BASOPHILS # BLD AUTO: 0.02 K/UL (ref 0–0.2)
BASOPHILS NFR BLD: 0.4 % (ref 0–1.9)
BILIRUB SERPL-MCNC: 0.5 MG/DL (ref 0.1–1)
BUN SERPL-MCNC: 10 MG/DL (ref 6–20)
CALCIUM SERPL-MCNC: 9.6 MG/DL (ref 8.7–10.5)
CHLORIDE SERPL-SCNC: 105 MMOL/L (ref 95–110)
CHOLEST SERPL-MCNC: 181 MG/DL (ref 120–199)
CHOLEST/HDLC SERPL: 4 {RATIO} (ref 2–5)
CO2 SERPL-SCNC: 28 MMOL/L (ref 23–29)
CREAT SERPL-MCNC: 0.8 MG/DL (ref 0.5–1.4)
CREAT UR-MCNC: 112 MG/DL (ref 15–325)
DIFFERENTIAL METHOD: ABNORMAL
EOSINOPHIL # BLD AUTO: 0.1 K/UL (ref 0–0.5)
EOSINOPHIL NFR BLD: 2.3 % (ref 0–8)
ERYTHROCYTE [DISTWIDTH] IN BLOOD BY AUTOMATED COUNT: 11.4 % (ref 11.5–14.5)
EST. GFR  (AFRICAN AMERICAN): >60 ML/MIN/1.73 M^2
EST. GFR  (NON AFRICAN AMERICAN): >60 ML/MIN/1.73 M^2
GLUCOSE SERPL-MCNC: 94 MG/DL (ref 70–110)
HCT VFR BLD AUTO: 40.2 % (ref 37–48.5)
HDLC SERPL-MCNC: 45 MG/DL (ref 40–75)
HDLC SERPL: 24.9 % (ref 20–50)
HGB BLD-MCNC: 13 G/DL (ref 12–16)
IMM GRANULOCYTES # BLD AUTO: 0.02 K/UL (ref 0–0.04)
IMM GRANULOCYTES NFR BLD AUTO: 0.4 % (ref 0–0.5)
LDLC SERPL CALC-MCNC: 94.4 MG/DL (ref 63–159)
LYMPHOCYTES # BLD AUTO: 1.9 K/UL (ref 1–4.8)
LYMPHOCYTES NFR BLD: 33.5 % (ref 18–48)
MCH RBC QN AUTO: 31.9 PG (ref 27–31)
MCHC RBC AUTO-ENTMCNC: 32.3 G/DL (ref 32–36)
MCV RBC AUTO: 99 FL (ref 82–98)
MICROALBUMIN UR DL<=1MG/L-MCNC: 9 UG/ML
MONOCYTES # BLD AUTO: 0.6 K/UL (ref 0.3–1)
MONOCYTES NFR BLD: 10.8 % (ref 4–15)
NEUTROPHILS # BLD AUTO: 2.9 K/UL (ref 1.8–7.7)
NEUTROPHILS NFR BLD: 52.6 % (ref 38–73)
NONHDLC SERPL-MCNC: 136 MG/DL
NRBC BLD-RTO: 0 /100 WBC
PLATELET # BLD AUTO: 156 K/UL (ref 150–350)
PMV BLD AUTO: 10.6 FL (ref 9.2–12.9)
POTASSIUM SERPL-SCNC: 4 MMOL/L (ref 3.5–5.1)
PROT SERPL-MCNC: 6.9 G/DL (ref 6–8.4)
RBC # BLD AUTO: 4.08 M/UL (ref 4–5.4)
SODIUM SERPL-SCNC: 142 MMOL/L (ref 136–145)
TRIGL SERPL-MCNC: 208 MG/DL (ref 30–150)
TSH SERPL DL<=0.005 MIU/L-ACNC: 2.07 UIU/ML (ref 0.4–4)
WBC # BLD AUTO: 5.56 K/UL (ref 3.9–12.7)

## 2019-11-22 PROCEDURE — 80053 COMPREHEN METABOLIC PANEL: CPT

## 2019-11-22 PROCEDURE — 84443 ASSAY THYROID STIM HORMONE: CPT

## 2019-11-22 PROCEDURE — 82043 UR ALBUMIN QUANTITATIVE: CPT

## 2019-11-22 PROCEDURE — 36415 COLL VENOUS BLD VENIPUNCTURE: CPT

## 2019-11-22 PROCEDURE — 80061 LIPID PANEL: CPT

## 2019-11-22 PROCEDURE — 85025 COMPLETE CBC W/AUTO DIFF WBC: CPT

## 2019-11-23 DIAGNOSIS — F31.9 BIPOLAR DEPRESSION: ICD-10-CM

## 2019-11-23 DIAGNOSIS — F41.9 ANXIETY: ICD-10-CM

## 2019-11-25 RX ORDER — ALPRAZOLAM 1 MG/1
TABLET ORAL
Qty: 90 TABLET | Refills: 2 | Status: SHIPPED | OUTPATIENT
Start: 2019-11-25 | End: 2019-11-26 | Stop reason: SDUPTHER

## 2019-11-26 ENCOUNTER — OFFICE VISIT (OUTPATIENT)
Dept: INTERNAL MEDICINE | Facility: CLINIC | Age: 54
End: 2019-11-26
Payer: COMMERCIAL

## 2019-11-26 ENCOUNTER — TELEPHONE (OUTPATIENT)
Dept: INTERNAL MEDICINE | Facility: CLINIC | Age: 54
End: 2019-11-26

## 2019-11-26 VITALS
DIASTOLIC BLOOD PRESSURE: 82 MMHG | HEIGHT: 61 IN | OXYGEN SATURATION: 97 % | HEART RATE: 98 BPM | TEMPERATURE: 98 F | WEIGHT: 160.25 LBS | BODY MASS INDEX: 30.26 KG/M2 | SYSTOLIC BLOOD PRESSURE: 120 MMHG | RESPIRATION RATE: 18 BRPM

## 2019-11-26 DIAGNOSIS — I10 BENIGN ESSENTIAL HTN: Primary | ICD-10-CM

## 2019-11-26 DIAGNOSIS — B97.89 VIRAL RESPIRATORY INFECTION: ICD-10-CM

## 2019-11-26 DIAGNOSIS — F41.9 ANXIETY: ICD-10-CM

## 2019-11-26 DIAGNOSIS — J98.8 VIRAL RESPIRATORY INFECTION: ICD-10-CM

## 2019-11-26 DIAGNOSIS — F41.1 GAD (GENERALIZED ANXIETY DISORDER): ICD-10-CM

## 2019-11-26 DIAGNOSIS — E66.9 OBESITY (BMI 30-39.9): ICD-10-CM

## 2019-11-26 DIAGNOSIS — G47.00 INSOMNIA, UNSPECIFIED TYPE: ICD-10-CM

## 2019-11-26 DIAGNOSIS — E78.5 HYPERLIPIDEMIA LDL GOAL <70: ICD-10-CM

## 2019-11-26 DIAGNOSIS — F31.9 BIPOLAR DEPRESSION: ICD-10-CM

## 2019-11-26 PROCEDURE — 99214 PR OFFICE/OUTPT VISIT, EST, LEVL IV, 30-39 MIN: ICD-10-PCS | Mod: 25,S$GLB,, | Performed by: NURSE PRACTITIONER

## 2019-11-26 PROCEDURE — 3008F PR BODY MASS INDEX (BMI) DOCUMENTED: ICD-10-PCS | Mod: CPTII,S$GLB,, | Performed by: NURSE PRACTITIONER

## 2019-11-26 PROCEDURE — 99999 PR PBB SHADOW E&M-EST. PATIENT-LVL IV: ICD-10-PCS | Mod: PBBFAC,,, | Performed by: NURSE PRACTITIONER

## 2019-11-26 PROCEDURE — 96372 PR INJECTION,THERAP/PROPH/DIAG2ST, IM OR SUBCUT: ICD-10-PCS | Mod: S$GLB,,, | Performed by: NURSE PRACTITIONER

## 2019-11-26 PROCEDURE — 3008F BODY MASS INDEX DOCD: CPT | Mod: CPTII,S$GLB,, | Performed by: NURSE PRACTITIONER

## 2019-11-26 PROCEDURE — 96372 THER/PROPH/DIAG INJ SC/IM: CPT | Mod: S$GLB,,, | Performed by: NURSE PRACTITIONER

## 2019-11-26 PROCEDURE — 3074F PR MOST RECENT SYSTOLIC BLOOD PRESSURE < 130 MM HG: ICD-10-PCS | Mod: CPTII,S$GLB,, | Performed by: NURSE PRACTITIONER

## 2019-11-26 PROCEDURE — 3079F PR MOST RECENT DIASTOLIC BLOOD PRESSURE 80-89 MM HG: ICD-10-PCS | Mod: CPTII,S$GLB,, | Performed by: NURSE PRACTITIONER

## 2019-11-26 PROCEDURE — 99999 PR PBB SHADOW E&M-EST. PATIENT-LVL IV: CPT | Mod: PBBFAC,,, | Performed by: NURSE PRACTITIONER

## 2019-11-26 PROCEDURE — 3074F SYST BP LT 130 MM HG: CPT | Mod: CPTII,S$GLB,, | Performed by: NURSE PRACTITIONER

## 2019-11-26 PROCEDURE — 3079F DIAST BP 80-89 MM HG: CPT | Mod: CPTII,S$GLB,, | Performed by: NURSE PRACTITIONER

## 2019-11-26 PROCEDURE — 99214 OFFICE O/P EST MOD 30 MIN: CPT | Mod: 25,S$GLB,, | Performed by: NURSE PRACTITIONER

## 2019-11-26 RX ORDER — QUETIAPINE FUMARATE 200 MG/1
200 TABLET, FILM COATED ORAL DAILY
Qty: 30 TABLET | Refills: 0 | Status: SHIPPED | OUTPATIENT
Start: 2019-11-26 | End: 2020-06-04 | Stop reason: SDUPTHER

## 2019-11-26 RX ORDER — ARIPIPRAZOLE 5 MG/1
5 TABLET ORAL DAILY
Qty: 90 TABLET | Refills: 1 | Status: SHIPPED | OUTPATIENT
Start: 2019-11-26 | End: 2020-02-20 | Stop reason: SDUPTHER

## 2019-11-26 RX ORDER — ALPRAZOLAM 1 MG/1
TABLET ORAL
Qty: 90 TABLET | Refills: 5 | Status: SHIPPED | OUTPATIENT
Start: 2019-11-26 | End: 2019-12-20 | Stop reason: SDUPTHER

## 2019-11-26 RX ORDER — METHYLPREDNISOLONE ACETATE 80 MG/ML
80 INJECTION, SUSPENSION INTRA-ARTICULAR; INTRALESIONAL; INTRAMUSCULAR; SOFT TISSUE
Status: COMPLETED | OUTPATIENT
Start: 2019-11-26 | End: 2019-11-26

## 2019-11-26 RX ORDER — PROMETHAZINE HYDROCHLORIDE AND DEXTROMETHORPHAN HYDROBROMIDE 6.25; 15 MG/5ML; MG/5ML
5 SYRUP ORAL EVERY 6 HOURS PRN
Qty: 120 ML | Refills: 0 | Status: SHIPPED | OUTPATIENT
Start: 2019-11-26 | End: 2019-11-26 | Stop reason: SDUPTHER

## 2019-11-26 RX ORDER — METOPROLOL SUCCINATE 50 MG/1
50 TABLET, EXTENDED RELEASE ORAL DAILY
Qty: 90 TABLET | Refills: 1 | Status: SHIPPED | OUTPATIENT
Start: 2019-11-26 | End: 2020-02-20 | Stop reason: SDUPTHER

## 2019-11-26 RX ORDER — PROMETHAZINE HYDROCHLORIDE AND DEXTROMETHORPHAN HYDROBROMIDE 6.25; 15 MG/5ML; MG/5ML
5 SYRUP ORAL EVERY 6 HOURS PRN
Qty: 120 ML | Refills: 0 | Status: SHIPPED | OUTPATIENT
Start: 2019-11-26 | End: 2019-12-03

## 2019-11-26 RX ORDER — QUETIAPINE FUMARATE 200 MG/1
200 TABLET, FILM COATED ORAL DAILY
Qty: 90 TABLET | Refills: 1 | Status: SHIPPED | OUTPATIENT
Start: 2019-11-26 | End: 2020-02-20 | Stop reason: SDUPTHER

## 2019-11-26 RX ADMIN — METHYLPREDNISOLONE ACETATE 80 MG: 80 INJECTION, SUSPENSION INTRA-ARTICULAR; INTRALESIONAL; INTRAMUSCULAR; SOFT TISSUE at 10:11

## 2019-11-26 NOTE — TELEPHONE ENCOUNTER
----- Message from Mariely Collins MA sent at 11/26/2019 11:50 AM CST -----  Contact: Patient  Patient needs her Promethezine DM re-sent to [facility].     [facility] doesn't carry out

## 2019-11-26 NOTE — PROGRESS NOTES
Subjective:       Patient ID: Lyndsey Gan is a 54 y.o. female.    Chief Complaint: Follow-up (6 months- results) and URI (x 2 wks- congestion and cough)    Patient is known, to me and presents with   Chief Complaint   Patient presents with    Follow-up     6 months- results    URI     x 2 wks- congestion and cough   .  Denies chest pain and shortness of breath.  Patient presents with check up and labs. Quit smoking six months ago. She is doing well. Would like to take Seroquel 200 mg for sleep which has worked before. Up to date with mammogram  HPI  Review of Systems   Constitutional: Negative for activity change, appetite change, fatigue, fever and unexpected weight change.   HENT: Positive for congestion and postnasal drip. Negative for ear discharge, ear pain, hearing loss and tinnitus.    Eyes: Negative for photophobia, pain and visual disturbance.   Respiratory: Positive for cough. Negative for shortness of breath, wheezing and stridor.    Cardiovascular: Negative for chest pain, palpitations and leg swelling.   Gastrointestinal: Negative for abdominal distention.   Genitourinary: Negative for difficulty urinating, dysuria, frequency, hematuria and urgency.   Musculoskeletal: Negative for arthralgias, back pain, gait problem, joint swelling and neck pain.   Skin: Negative.    Neurological: Negative for dizziness, seizures, syncope, weakness, light-headedness, numbness and headaches.   Hematological: Negative for adenopathy. Does not bruise/bleed easily.   Psychiatric/Behavioral: Positive for sleep disturbance. Negative for behavioral problems, confusion, dysphoric mood, hallucinations and suicidal ideas. The patient is not nervous/anxious.        Objective:      Physical Exam   Constitutional: She is oriented to person, place, and time. She appears well-developed and well-nourished. No distress.   HENT:   Head: Normocephalic and atraumatic.   Right Ear: External ear normal. Tympanic membrane mobility is  abnormal.   Left Ear: External ear normal. Tympanic membrane mobility is abnormal.   Nose: Mucosal edema present.   Mouth/Throat: Oropharynx is clear and moist. No oropharyngeal exudate or posterior oropharyngeal erythema.   Eyes: Pupils are equal, round, and reactive to light. Conjunctivae and EOM are normal. Right eye exhibits no discharge. Left eye exhibits no discharge.   Neck: Normal range of motion. Neck supple. No JVD present. No thyromegaly present.   Cardiovascular: Normal rate, regular rhythm, normal heart sounds and intact distal pulses. Exam reveals no gallop and no friction rub.   No murmur heard.  Pulmonary/Chest: Effort normal and breath sounds normal. No stridor. No respiratory distress. She has no wheezes. She has no rales. She exhibits no tenderness.   Abdominal: Soft. Bowel sounds are normal. She exhibits no distension and no mass. There is no tenderness. There is no rebound.   Musculoskeletal: Normal range of motion. She exhibits no edema or tenderness.   Lymphadenopathy:     She has no cervical adenopathy.   Neurological: She is alert and oriented to person, place, and time. She has normal reflexes. She displays normal reflexes. No cranial nerve deficit. She exhibits normal muscle tone. Coordination normal.   Skin: Skin is warm and dry. Capillary refill takes less than 2 seconds. No rash noted. No erythema. No pallor.   Psychiatric: She has a normal mood and affect. Her behavior is normal. Judgment and thought content normal.   Negative SI/HI noted       Assessment:       1. Benign essential HTN    2. Hyperlipidemia LDL goal <70    3. BILL (generalized anxiety disorder)    4. Bipolar depression    5. Anxiety    6. Insomnia, unspecified type    7. Obesity (BMI 30-39.9)    8. Viral respiratory infection        Plan:   Lyndsey was seen today for follow-up and uri.    Diagnoses and all orders for this visit:    Benign essential HTN  -     CBC auto differential; Future  -     Comprehensive metabolic  "panel; Future  -     Microalbumin/creatinine urine ratio; Future  -     metoprolol succinate (TOPROL-XL) 50 MG 24 hr tablet; Take 1 tablet (50 mg total) by mouth once daily.    Hyperlipidemia LDL goal <70  -     CBC auto differential; Future  -     Comprehensive metabolic panel; Future  -     TSH; Future  -     Lipid panel; Future    BILL (generalized anxiety disorder)  -     CBC auto differential; Future  -     Comprehensive metabolic panel; Future    Bipolar depression  -     CBC auto differential; Future  -     Comprehensive metabolic panel; Future  -     ALPRAZolam (XANAX) 1 MG tablet; TAKE ONE TABLET BY MOUTH THREE TIMES DAILY AS NEEDED    Anxiety  -     ALPRAZolam (XANAX) 1 MG tablet; TAKE ONE TABLET BY MOUTH THREE TIMES DAILY AS NEEDED  -     ARIPiprazole (ABILIFY) 5 MG Tab; Take 1 tablet (5 mg total) by mouth once daily.    Insomnia, unspecified type  -     QUEtiapine (SEROQUEL) 200 MG Tab; Take 1 tablet (200 mg total) by mouth once daily.  -     QUEtiapine (SEROQUEL) 200 MG Tab; Take 1 tablet (200 mg total) by mouth once daily.    Obesity (BMI 30-39.9)  -     CBC auto differential; Future  -     Comprehensive metabolic panel; Future    Viral respiratory infection  -     methylPREDNISolone acetate injection 80 mg  -     promethazine-dextromethorphan (PROMETHAZINE-DM) 6.25-15 mg/5 mL Syrp; Take 5 mLs by mouth every 6 (six) hours as needed.    "This note will not be shared with the patient."  Lab drawn today CBC, CMP, TSH, FLP  Limit the cholesterol in your diet to less than 300 mg per day.  Fats should contribute no more than 20 to 35% of your daily calories.  Less than 7 to 10% of your calories should come from saturated fat.  Avoid saturated fat products e.g., butter, some oils, meat, and poultry fat contain a lot of saturated fat.  Check food labels for fat and cholesterol content. Choose the foods with less fat per serving.  Limit the amount of butter and margarine you eat.  Use salad dressings and " margarine made with polyunsaturated and monunsaturated fats.  Use egg whites or egg substitutes rather than whole eggs.  Replace whole-milk dairy products with nonfat or low-fat mild, cheese, spreads, and yogurt.  Eat skinless chicken, turkey, fish, and meatless entrees more often than red meat.  Choose lean cuts of meat and trim off all visible fat. Keep portion sizes moderate.  Avoid fatty desserts such as ice cream, cream-filled cakes, and cheesecakes. Choose fresh fruits, nonfat frozen yogurt, Popsicles, etc.  Reduce the amount of fried foods, vending machine food, and fast food you eat.  Eat fruits and vegetables (especially fresh fruits and leafy vegetables), beans, and whole grains daily. The fiber in these foods helps lower cholesterol.  Look for low-fat or nonfat varieties of the foods you like to eat or look for substitutes.  You may need to exercise 60 minutes a day to prevent weight gain and 90 minutes a day to lose weight.  RTC in six months.

## 2019-12-20 ENCOUNTER — TELEPHONE (OUTPATIENT)
Dept: INTERNAL MEDICINE | Facility: CLINIC | Age: 54
End: 2019-12-20

## 2019-12-20 DIAGNOSIS — F31.9 BIPOLAR DEPRESSION: ICD-10-CM

## 2019-12-20 DIAGNOSIS — F41.9 ANXIETY: ICD-10-CM

## 2019-12-20 RX ORDER — ALPRAZOLAM 1 MG/1
TABLET ORAL
Qty: 90 TABLET | Refills: 5 | Status: SHIPPED | OUTPATIENT
Start: 2019-12-20 | End: 2020-08-11 | Stop reason: SDUPTHER

## 2019-12-20 NOTE — TELEPHONE ENCOUNTER
----- Message from Mariely Collins MA sent at 12/20/2019 11:34 AM CST -----  Contact: Patient  Patient is leaving to go out of town on Monday the 23rd. Her Xanax isnt due until the 26th.   Pharmacy will not fill early unless Dorothy calls and approves.     Talia Escamilla.

## 2019-12-23 ENCOUNTER — TELEPHONE (OUTPATIENT)
Dept: INTERNAL MEDICINE | Facility: CLINIC | Age: 54
End: 2019-12-23

## 2019-12-23 NOTE — TELEPHONE ENCOUNTER
----- Message from Candi Sanchez MA sent at 12/23/2019  9:34 AM CST -----  Contact: self   Pt requesting refill on xanax. She is going out of town today and will not be back until January 1st. She will be due for it on the 26th.

## 2020-01-15 ENCOUNTER — HOSPITAL ENCOUNTER (EMERGENCY)
Facility: HOSPITAL | Age: 55
Discharge: HOME | End: 2020-01-15
Attending: SURGERY
Payer: COMMERCIAL

## 2020-01-15 VITALS
HEART RATE: 79 BPM | SYSTOLIC BLOOD PRESSURE: 146 MMHG | WEIGHT: 153 LBS | OXYGEN SATURATION: 98 % | TEMPERATURE: 100 F | DIASTOLIC BLOOD PRESSURE: 74 MMHG | RESPIRATION RATE: 20 BRPM | BODY MASS INDEX: 28.91 KG/M2

## 2020-01-15 DIAGNOSIS — R10.13 EPIGASTRIC ABDOMINAL PAIN: ICD-10-CM

## 2020-01-15 DIAGNOSIS — A08.4 VIRAL GASTROENTERITIS: Primary | ICD-10-CM

## 2020-01-15 LAB
ALBUMIN SERPL BCP-MCNC: 4.6 G/DL (ref 3.5–5.2)
ALP SERPL-CCNC: 53 U/L (ref 55–135)
ALT SERPL W/O P-5'-P-CCNC: 50 U/L (ref 10–44)
AMPHET+METHAMPHET UR QL: NEGATIVE
ANION GAP SERPL CALC-SCNC: 13 MMOL/L (ref 8–16)
AST SERPL-CCNC: 35 U/L (ref 10–40)
BARBITURATES UR QL SCN>200 NG/ML: NEGATIVE
BASOPHILS # BLD AUTO: 0.06 K/UL (ref 0–0.2)
BASOPHILS NFR BLD: 0.4 % (ref 0–1.9)
BENZODIAZ UR QL SCN>200 NG/ML: NORMAL
BILIRUB SERPL-MCNC: 1.1 MG/DL (ref 0.1–1)
BILIRUB UR QL STRIP: NEGATIVE
BUN SERPL-MCNC: 14 MG/DL (ref 6–20)
BZE UR QL SCN: NEGATIVE
CALCIUM SERPL-MCNC: 10 MG/DL (ref 8.7–10.5)
CANNABINOIDS UR QL SCN: NORMAL
CHLORIDE SERPL-SCNC: 98 MMOL/L (ref 95–110)
CK MB SERPL-MCNC: 1.4 NG/ML (ref 0.1–6.5)
CK MB SERPL-RTO: 1.1 % (ref 0–5)
CK SERPL-CCNC: 125 U/L (ref 20–180)
CK SERPL-CCNC: 125 U/L (ref 20–180)
CLARITY UR: CLEAR
CO2 SERPL-SCNC: 25 MMOL/L (ref 23–29)
COLOR UR: YELLOW
CREAT SERPL-MCNC: 0.8 MG/DL (ref 0.5–1.4)
CREAT UR-MCNC: 229.5 MG/DL (ref 15–325)
DIFFERENTIAL METHOD: ABNORMAL
EOSINOPHIL # BLD AUTO: 0.1 K/UL (ref 0–0.5)
EOSINOPHIL NFR BLD: 0.4 % (ref 0–8)
ERYTHROCYTE [DISTWIDTH] IN BLOOD BY AUTOMATED COUNT: 11.2 % (ref 11.5–14.5)
EST. GFR  (AFRICAN AMERICAN): >60 ML/MIN/1.73 M^2
EST. GFR  (NON AFRICAN AMERICAN): >60 ML/MIN/1.73 M^2
GLUCOSE SERPL-MCNC: 112 MG/DL (ref 70–110)
GLUCOSE UR QL STRIP: NEGATIVE
HCT VFR BLD AUTO: 42 % (ref 37–48.5)
HGB BLD-MCNC: 14.7 G/DL (ref 12–16)
HGB UR QL STRIP: ABNORMAL
IMM GRANULOCYTES # BLD AUTO: 0.07 K/UL (ref 0–0.04)
IMM GRANULOCYTES NFR BLD AUTO: 0.5 % (ref 0–0.5)
KETONES UR QL STRIP: ABNORMAL
LEUKOCYTE ESTERASE UR QL STRIP: NEGATIVE
LIPASE SERPL-CCNC: 82 U/L (ref 4–60)
LYMPHOCYTES # BLD AUTO: 2.3 K/UL (ref 1–4.8)
LYMPHOCYTES NFR BLD: 16.1 % (ref 18–48)
MCH RBC QN AUTO: 32.2 PG (ref 27–31)
MCHC RBC AUTO-ENTMCNC: 35 G/DL (ref 32–36)
MCV RBC AUTO: 92 FL (ref 82–98)
METHADONE UR QL SCN>300 NG/ML: NEGATIVE
MONOCYTES # BLD AUTO: 1.6 K/UL (ref 0.3–1)
MONOCYTES NFR BLD: 11.3 % (ref 4–15)
NEUTROPHILS # BLD AUTO: 10.4 K/UL (ref 1.8–7.7)
NEUTROPHILS NFR BLD: 71.3 % (ref 38–73)
NITRITE UR QL STRIP: NEGATIVE
NRBC BLD-RTO: 0 /100 WBC
OPIATES UR QL SCN: NORMAL
PCP UR QL SCN>25 NG/ML: NEGATIVE
PH UR STRIP: 6 [PH] (ref 5–8)
PLATELET # BLD AUTO: 247 K/UL (ref 150–350)
PMV BLD AUTO: 9.7 FL (ref 9.2–12.9)
POTASSIUM SERPL-SCNC: 3.2 MMOL/L (ref 3.5–5.1)
PROT SERPL-MCNC: 7.6 G/DL (ref 6–8.4)
PROT UR QL STRIP: NEGATIVE
RBC # BLD AUTO: 4.56 M/UL (ref 4–5.4)
SODIUM SERPL-SCNC: 136 MMOL/L (ref 136–145)
SP GR UR STRIP: 1.02 (ref 1–1.03)
TOXICOLOGY INFORMATION: NORMAL
TROPONIN I SERPL DL<=0.01 NG/ML-MCNC: <0.006 NG/ML (ref 0–0.03)
URN SPEC COLLECT METH UR: ABNORMAL
UROBILINOGEN UR STRIP-ACNC: 1 EU/DL
WBC # BLD AUTO: 14.52 K/UL (ref 3.9–12.7)

## 2020-01-15 PROCEDURE — 80053 COMPREHEN METABOLIC PANEL: CPT

## 2020-01-15 PROCEDURE — 93010 EKG 12-LEAD: ICD-10-PCS | Mod: ,,, | Performed by: INTERNAL MEDICINE

## 2020-01-15 PROCEDURE — 93010 ELECTROCARDIOGRAM REPORT: CPT | Mod: ,,, | Performed by: INTERNAL MEDICINE

## 2020-01-15 PROCEDURE — 25500020 PHARM REV CODE 255

## 2020-01-15 PROCEDURE — 96375 TX/PRO/DX INJ NEW DRUG ADDON: CPT

## 2020-01-15 PROCEDURE — 85025 COMPLETE CBC W/AUTO DIFF WBC: CPT

## 2020-01-15 PROCEDURE — 99285 EMERGENCY DEPT VISIT HI MDM: CPT | Mod: 25

## 2020-01-15 PROCEDURE — 25000003 PHARM REV CODE 250: Performed by: NURSE PRACTITIONER

## 2020-01-15 PROCEDURE — 25500020 PHARM REV CODE 255: Performed by: SURGERY

## 2020-01-15 PROCEDURE — 82550 ASSAY OF CK (CPK): CPT

## 2020-01-15 PROCEDURE — 96374 THER/PROPH/DIAG INJ IV PUSH: CPT

## 2020-01-15 PROCEDURE — 82553 CREATINE MB FRACTION: CPT

## 2020-01-15 PROCEDURE — 80307 DRUG TEST PRSMV CHEM ANLYZR: CPT

## 2020-01-15 PROCEDURE — 63600175 PHARM REV CODE 636 W HCPCS: Performed by: NURSE PRACTITIONER

## 2020-01-15 PROCEDURE — 81003 URINALYSIS AUTO W/O SCOPE: CPT

## 2020-01-15 PROCEDURE — 83690 ASSAY OF LIPASE: CPT

## 2020-01-15 PROCEDURE — 96361 HYDRATE IV INFUSION ADD-ON: CPT

## 2020-01-15 PROCEDURE — 74177 CT ABD & PELVIS W/CONTRAST: CPT

## 2020-01-15 PROCEDURE — 93005 ELECTROCARDIOGRAM TRACING: CPT

## 2020-01-15 PROCEDURE — 36415 COLL VENOUS BLD VENIPUNCTURE: CPT

## 2020-01-15 PROCEDURE — 84484 ASSAY OF TROPONIN QUANT: CPT

## 2020-01-15 PROCEDURE — 63600175 PHARM REV CODE 636 W HCPCS: Performed by: SURGERY

## 2020-01-15 PROCEDURE — 96376 TX/PRO/DX INJ SAME DRUG ADON: CPT

## 2020-01-15 RX ORDER — PROMETHAZINE HYDROCHLORIDE 25 MG/1
25 TABLET ORAL EVERY 6 HOURS PRN
Qty: 15 TABLET | Refills: 0 | Status: SHIPPED | OUTPATIENT
Start: 2020-01-15 | End: 2022-10-28 | Stop reason: SDUPTHER

## 2020-01-15 RX ORDER — ONDANSETRON 2 MG/ML
4 INJECTION INTRAMUSCULAR; INTRAVENOUS
Status: COMPLETED | OUTPATIENT
Start: 2020-01-15 | End: 2020-01-15

## 2020-01-15 RX ORDER — ACETAMINOPHEN 325 MG/1
650 TABLET ORAL
Status: COMPLETED | OUTPATIENT
Start: 2020-01-15 | End: 2020-01-15

## 2020-01-15 RX ORDER — PANTOPRAZOLE SODIUM 40 MG/1
40 TABLET, DELAYED RELEASE ORAL DAILY
Qty: 30 TABLET | Refills: 0 | Status: SHIPPED | OUTPATIENT
Start: 2020-01-15 | End: 2020-02-05 | Stop reason: SDUPTHER

## 2020-01-15 RX ORDER — DICYCLOMINE HYDROCHLORIDE 20 MG/1
20 TABLET ORAL 4 TIMES DAILY PRN
Qty: 15 TABLET | Refills: 0 | Status: SHIPPED | OUTPATIENT
Start: 2020-01-15 | End: 2020-02-14

## 2020-01-15 RX ORDER — HYDROMORPHONE HYDROCHLORIDE 1 MG/ML
1 INJECTION, SOLUTION INTRAMUSCULAR; INTRAVENOUS; SUBCUTANEOUS
Status: COMPLETED | OUTPATIENT
Start: 2020-01-15 | End: 2020-01-15

## 2020-01-15 RX ORDER — ONDANSETRON 2 MG/ML
4 INJECTION INTRAMUSCULAR; INTRAVENOUS
Status: DISCONTINUED | OUTPATIENT
Start: 2020-01-15 | End: 2020-01-15

## 2020-01-15 RX ADMIN — HYDROMORPHONE HYDROCHLORIDE 1 MG: 1 INJECTION, SOLUTION INTRAMUSCULAR; INTRAVENOUS; SUBCUTANEOUS at 11:01

## 2020-01-15 RX ADMIN — IOHEXOL 80 ML: 350 INJECTION, SOLUTION INTRAVENOUS at 10:01

## 2020-01-15 RX ADMIN — SODIUM CHLORIDE 1000 ML: 0.9 INJECTION, SOLUTION INTRAVENOUS at 07:01

## 2020-01-15 RX ADMIN — ACETAMINOPHEN 650 MG: 325 TABLET ORAL at 07:01

## 2020-01-15 RX ADMIN — ONDANSETRON 4 MG: 2 INJECTION INTRAMUSCULAR; INTRAVENOUS at 07:01

## 2020-01-15 RX ADMIN — ONDANSETRON 4 MG: 2 INJECTION INTRAMUSCULAR; INTRAVENOUS at 11:01

## 2020-01-16 NOTE — ED TRIAGE NOTES
"54 y.o. female presents to ER   Chief Complaint   Patient presents with    Vomiting     x5 days. denies diarrhea now. sent by urgent care for "labs and imaging".    . No acute distress noted.  "

## 2020-01-16 NOTE — ED PROVIDER NOTES
"Encounter Date: 1/15/2020       History     Chief Complaint   Patient presents with    Vomiting     x5 days. denies diarrhea now. sent by urgent care for "labs and imaging".      The history is provided by the patient.   Abdominal Pain   The current episode started several days ago. The problem has been resolved. The abdominal pain is located in the epigastric region. The abdominal pain does not radiate. The other symptoms of the illness include nausea, vomiting and diarrhea. The other symptoms of the illness do not include fever, shortness of breath or dysuria.   The diarrhea began 3 to 5 days ago. The diarrhea occurs 2 - 4 times per day.   Nausea began 3 to 5 days ago.   The vomiting began more than 2 days ago. Vomiting occurs 2 to 5 times per day.   Symptoms associated with the illness do not include urgency, frequency or back pain.     Review of patient's allergies indicates:  No Known Allergies  Past Medical History:   Diagnosis Date    Anxiety     Asthmatic bronchitis without complication 2015    Bipolar affective     Chronic cough     Hypertension     Mass     Left Index Finger  -DIP joint     Past Surgical History:   Procedure Laterality Date    HYSTERECTOMY  2007    left Pointer finger       TONSILLECTOMY  1977     Family History   Problem Relation Age of Onset    Heart disease Mother     Depression Mother     Heart disease Father     Hypertension Father     Breast cancer Maternal Grandmother     Colon cancer Neg Hx     Ovarian cancer Neg Hx      Social History     Tobacco Use    Smoking status: Former Smoker     Packs/day: 0.75     Types: Cigarettes     Start date: 2007     Last attempt to quit: 2019     Years since quittin.5    Smokeless tobacco: Never Used    Tobacco comment: told about smoking cessation brochures and programs   Substance Use Topics    Alcohol use: Yes     Alcohol/week: 3.0 standard drinks     Types: 3 Cans of beer per week     Comment: weekend    " Drug use: No     Review of Systems   Constitutional: Negative for fever.   HENT: Negative for congestion, ear pain, rhinorrhea, sore throat and trouble swallowing.    Eyes: Negative for pain and redness.   Respiratory: Negative for cough and shortness of breath.    Cardiovascular: Negative for chest pain.   Gastrointestinal: Positive for abdominal pain, diarrhea, nausea and vomiting.   Genitourinary: Negative for difficulty urinating, dysuria, frequency and urgency.   Musculoskeletal: Negative for arthralgias, back pain, myalgias and neck pain.   Skin: Negative for rash and wound.   Neurological: Negative for seizures, weakness and headaches.   Psychiatric/Behavioral: Negative.        Physical Exam     Initial Vitals [01/15/20 1812]   BP Pulse Resp Temp SpO2   138/81 84 18 100.1 °F (37.8 °C) 99 %      MAP       --         Physical Exam    Nursing note and vitals reviewed.  Constitutional: No distress.   HENT:   Head: Normocephalic and atraumatic.   Right Ear: External ear normal.   Left Ear: External ear normal.   Nose: Nose normal.   Mouth/Throat: Oropharynx is clear and moist and mucous membranes are normal.   Eyes: Conjunctivae, EOM and lids are normal. Pupils are equal, round, and reactive to light.   Neck: Neck supple.   Cardiovascular: Normal rate, regular rhythm, S1 normal, S2 normal, normal heart sounds and intact distal pulses.   Pulmonary/Chest: Effort normal and breath sounds normal. No respiratory distress.   Abdominal: Soft. Bowel sounds are normal. There is no tenderness.   Musculoskeletal: Normal range of motion.   Neurological: She is alert and oriented to person, place, and time. She has normal strength. GCS eye subscore is 4. GCS verbal subscore is 5. GCS motor subscore is 6.   Skin: Skin is warm and dry. Capillary refill takes less than 2 seconds. No rash noted.   Psychiatric: She has a normal mood and affect. Her speech is normal and behavior is normal.         ED Course   Procedures  Labs  Reviewed   CBC W/ AUTO DIFFERENTIAL - Abnormal; Notable for the following components:       Result Value    WBC 14.52 (*)     Mean Corpuscular Hemoglobin 32.2 (*)     RDW 11.2 (*)     Gran # (ANC) 10.4 (*)     Immature Grans (Abs) 0.07 (*)     Mono # 1.6 (*)     Lymph% 16.1 (*)     All other components within normal limits   COMPREHENSIVE METABOLIC PANEL - Abnormal; Notable for the following components:    Potassium 3.2 (*)     Glucose 112 (*)     Total Bilirubin 1.1 (*)     Alkaline Phosphatase 53 (*)     ALT 50 (*)     All other components within normal limits   LIPASE - Abnormal; Notable for the following components:    Lipase 82 (*)     All other components within normal limits   URINALYSIS, REFLEX TO URINE CULTURE - Abnormal; Notable for the following components:    Ketones, UA 1+ (*)     Occult Blood UA Trace (*)     All other components within normal limits    Narrative:     Preferred Collection Type->Urine, Clean Catch   CK-MB   CK   TROPONIN I   DRUG SCREEN PANEL, URINE EMERGENCY    Narrative:     Preferred Collection Type->Urine, Clean Catch          Imaging Results          CT Abdomen Pelvis With Contrast (In process)                            Medications   acetaminophen tablet 650 mg (650 mg Oral Given 1/15/20 1944)   sodium chloride 0.9% bolus 1,000 mL (0 mLs Intravenous Stopped 1/15/20 2037)   ondansetron injection 4 mg (4 mg Intravenous Given 1/15/20 1939)   iohexol (OMNIPAQUE 350) injection 80 mL (80 mLs Intravenous Given 1/15/20 2243)                              Clinical Impression:       ICD-10-CM ICD-9-CM   1. Viral gastroenteritis A08.4 008.8   2. Epigastric abdominal pain R10.13 789.06         Disposition:   Disposition: Discharged                     Ger Ayon MD  01/15/20 5437

## 2020-02-05 NOTE — TELEPHONE ENCOUNTER
----- Message from Mariely Collins MA sent at 2/5/2020 12:36 PM CST -----  Contact: Patient  Patient was seen in ER on 1- for gastroenteritis. She was given Pantoprozole for reflux and she says she feels so much better she would like to stay on it.     Send script to [facility]'s pharmacy

## 2020-02-06 RX ORDER — PANTOPRAZOLE SODIUM 40 MG/1
40 TABLET, DELAYED RELEASE ORAL DAILY
Qty: 30 TABLET | Refills: 5 | Status: SHIPPED | OUTPATIENT
Start: 2020-02-06 | End: 2020-08-11

## 2020-02-20 ENCOUNTER — TELEPHONE (OUTPATIENT)
Dept: INTERNAL MEDICINE | Facility: CLINIC | Age: 55
End: 2020-02-20

## 2020-02-20 DIAGNOSIS — I10 BENIGN ESSENTIAL HTN: ICD-10-CM

## 2020-02-20 DIAGNOSIS — G47.00 INSOMNIA, UNSPECIFIED TYPE: ICD-10-CM

## 2020-02-20 DIAGNOSIS — F41.9 ANXIETY: ICD-10-CM

## 2020-02-20 RX ORDER — QUETIAPINE FUMARATE 200 MG/1
200 TABLET, FILM COATED ORAL DAILY
Qty: 90 TABLET | Refills: 1 | Status: SHIPPED | OUTPATIENT
Start: 2020-02-20 | End: 2020-08-11 | Stop reason: SDUPTHER

## 2020-02-20 RX ORDER — METOPROLOL SUCCINATE 50 MG/1
50 TABLET, EXTENDED RELEASE ORAL DAILY
Qty: 90 TABLET | Refills: 1 | Status: SHIPPED | OUTPATIENT
Start: 2020-02-20 | End: 2020-10-19 | Stop reason: SDUPTHER

## 2020-02-20 RX ORDER — ARIPIPRAZOLE 5 MG/1
5 TABLET ORAL DAILY
Qty: 90 TABLET | Refills: 1 | Status: SHIPPED | OUTPATIENT
Start: 2020-02-20 | End: 2020-08-11 | Stop reason: SDUPTHER

## 2020-02-20 NOTE — TELEPHONE ENCOUNTER
----- Message from Mariely Collins MA sent at 2/20/2020 11:23 AM CST -----  Contact: Patient  Patient needs new prescriptions.     All 3 will be 90 day scripts, Abilify,  Seroquel and Metoprolol.    Send to [facility] ([place])

## 2020-05-26 ENCOUNTER — CLINICAL SUPPORT (OUTPATIENT)
Dept: INTERNAL MEDICINE | Facility: CLINIC | Age: 55
End: 2020-05-26
Payer: COMMERCIAL

## 2020-05-26 DIAGNOSIS — I10 BENIGN ESSENTIAL HTN: ICD-10-CM

## 2020-05-26 DIAGNOSIS — F41.1 GAD (GENERALIZED ANXIETY DISORDER): ICD-10-CM

## 2020-05-26 DIAGNOSIS — E78.5 HYPERLIPIDEMIA LDL GOAL <70: ICD-10-CM

## 2020-05-26 DIAGNOSIS — E66.9 OBESITY (BMI 30-39.9): ICD-10-CM

## 2020-05-26 DIAGNOSIS — F31.9 BIPOLAR DEPRESSION: ICD-10-CM

## 2020-05-26 LAB
ALBUMIN SERPL BCP-MCNC: 4.1 G/DL (ref 3.5–5.2)
ALBUMIN/CREAT UR: 6.9 UG/MG (ref 0–30)
ALP SERPL-CCNC: 64 U/L (ref 55–135)
ALT SERPL W/O P-5'-P-CCNC: 22 U/L (ref 10–44)
ANION GAP SERPL CALC-SCNC: 9 MMOL/L (ref 8–16)
AST SERPL-CCNC: 24 U/L (ref 10–40)
BASOPHILS # BLD AUTO: 0.02 K/UL (ref 0–0.2)
BASOPHILS NFR BLD: 0.3 % (ref 0–1.9)
BILIRUB SERPL-MCNC: 0.4 MG/DL (ref 0.1–1)
BUN SERPL-MCNC: 12 MG/DL (ref 6–20)
CALCIUM SERPL-MCNC: 10 MG/DL (ref 8.7–10.5)
CHLORIDE SERPL-SCNC: 107 MMOL/L (ref 95–110)
CHOLEST SERPL-MCNC: 220 MG/DL (ref 120–199)
CHOLEST/HDLC SERPL: 4.7 {RATIO} (ref 2–5)
CO2 SERPL-SCNC: 26 MMOL/L (ref 23–29)
CREAT SERPL-MCNC: 0.7 MG/DL (ref 0.5–1.4)
CREAT UR-MCNC: 58 MG/DL (ref 15–325)
DIFFERENTIAL METHOD: ABNORMAL
EOSINOPHIL # BLD AUTO: 0.1 K/UL (ref 0–0.5)
EOSINOPHIL NFR BLD: 1.7 % (ref 0–8)
ERYTHROCYTE [DISTWIDTH] IN BLOOD BY AUTOMATED COUNT: 11.5 % (ref 11.5–14.5)
EST. GFR  (AFRICAN AMERICAN): >60 ML/MIN/1.73 M^2
EST. GFR  (NON AFRICAN AMERICAN): >60 ML/MIN/1.73 M^2
GLUCOSE SERPL-MCNC: 102 MG/DL (ref 70–110)
HCT VFR BLD AUTO: 42.2 % (ref 37–48.5)
HDLC SERPL-MCNC: 47 MG/DL (ref 40–75)
HDLC SERPL: 21.4 % (ref 20–50)
HGB BLD-MCNC: 13.9 G/DL (ref 12–16)
IMM GRANULOCYTES # BLD AUTO: 0.02 K/UL (ref 0–0.04)
IMM GRANULOCYTES NFR BLD AUTO: 0.3 % (ref 0–0.5)
LDLC SERPL CALC-MCNC: 105.8 MG/DL (ref 63–159)
LYMPHOCYTES # BLD AUTO: 1.9 K/UL (ref 1–4.8)
LYMPHOCYTES NFR BLD: 28.3 % (ref 18–48)
MCH RBC QN AUTO: 32 PG (ref 27–31)
MCHC RBC AUTO-ENTMCNC: 32.9 G/DL (ref 32–36)
MCV RBC AUTO: 97 FL (ref 82–98)
MICROALBUMIN UR DL<=1MG/L-MCNC: 4 UG/ML
MONOCYTES # BLD AUTO: 0.7 K/UL (ref 0.3–1)
MONOCYTES NFR BLD: 10.1 % (ref 4–15)
NEUTROPHILS # BLD AUTO: 4 K/UL (ref 1.8–7.7)
NEUTROPHILS NFR BLD: 59.3 % (ref 38–73)
NONHDLC SERPL-MCNC: 173 MG/DL
NRBC BLD-RTO: 0 /100 WBC
PLATELET # BLD AUTO: 187 K/UL (ref 150–350)
PMV BLD AUTO: 10.9 FL (ref 9.2–12.9)
POTASSIUM SERPL-SCNC: 3.7 MMOL/L (ref 3.5–5.1)
PROT SERPL-MCNC: 7.4 G/DL (ref 6–8.4)
RBC # BLD AUTO: 4.35 M/UL (ref 4–5.4)
SODIUM SERPL-SCNC: 142 MMOL/L (ref 136–145)
TRIGL SERPL-MCNC: 336 MG/DL (ref 30–150)
TSH SERPL DL<=0.005 MIU/L-ACNC: 1.67 UIU/ML (ref 0.4–4)
WBC # BLD AUTO: 6.65 K/UL (ref 3.9–12.7)

## 2020-05-26 PROCEDURE — 80053 COMPREHEN METABOLIC PANEL: CPT

## 2020-05-26 PROCEDURE — 82043 UR ALBUMIN QUANTITATIVE: CPT

## 2020-05-26 PROCEDURE — 36415 COLL VENOUS BLD VENIPUNCTURE: CPT

## 2020-05-26 PROCEDURE — 80061 LIPID PANEL: CPT

## 2020-05-26 PROCEDURE — 84443 ASSAY THYROID STIM HORMONE: CPT

## 2020-05-26 PROCEDURE — 85025 COMPLETE CBC W/AUTO DIFF WBC: CPT

## 2020-06-04 ENCOUNTER — OFFICE VISIT (OUTPATIENT)
Dept: INTERNAL MEDICINE | Facility: CLINIC | Age: 55
End: 2020-06-04
Payer: COMMERCIAL

## 2020-06-04 VITALS
SYSTOLIC BLOOD PRESSURE: 122 MMHG | TEMPERATURE: 97 F | HEART RATE: 98 BPM | OXYGEN SATURATION: 95 % | RESPIRATION RATE: 18 BRPM | WEIGHT: 166 LBS | DIASTOLIC BLOOD PRESSURE: 78 MMHG | HEIGHT: 61 IN | BODY MASS INDEX: 31.34 KG/M2

## 2020-06-04 DIAGNOSIS — F31.9 BIPOLAR DEPRESSION: ICD-10-CM

## 2020-06-04 DIAGNOSIS — I10 BENIGN ESSENTIAL HTN: Primary | ICD-10-CM

## 2020-06-04 DIAGNOSIS — E66.9 OBESITY (BMI 30-39.9): ICD-10-CM

## 2020-06-04 DIAGNOSIS — E78.5 HYPERLIPIDEMIA LDL GOAL <70: ICD-10-CM

## 2020-06-04 DIAGNOSIS — F41.1 GAD (GENERALIZED ANXIETY DISORDER): ICD-10-CM

## 2020-06-04 DIAGNOSIS — Z71.6 TOBACCO ABUSE COUNSELING: ICD-10-CM

## 2020-06-04 DIAGNOSIS — G47.00 INSOMNIA, UNSPECIFIED TYPE: ICD-10-CM

## 2020-06-04 DIAGNOSIS — Z72.0 TOBACCO ABUSE: ICD-10-CM

## 2020-06-04 DIAGNOSIS — F41.9 ANXIETY: ICD-10-CM

## 2020-06-04 PROCEDURE — 3008F PR BODY MASS INDEX (BMI) DOCUMENTED: ICD-10-PCS | Mod: CPTII,S$GLB,, | Performed by: NURSE PRACTITIONER

## 2020-06-04 PROCEDURE — 3074F PR MOST RECENT SYSTOLIC BLOOD PRESSURE < 130 MM HG: ICD-10-PCS | Mod: CPTII,S$GLB,, | Performed by: NURSE PRACTITIONER

## 2020-06-04 PROCEDURE — 3074F SYST BP LT 130 MM HG: CPT | Mod: CPTII,S$GLB,, | Performed by: NURSE PRACTITIONER

## 2020-06-04 PROCEDURE — 99999 PR PBB SHADOW E&M-EST. PATIENT-LVL IV: CPT | Mod: PBBFAC,,, | Performed by: NURSE PRACTITIONER

## 2020-06-04 PROCEDURE — 99214 OFFICE O/P EST MOD 30 MIN: CPT | Mod: S$GLB,,, | Performed by: NURSE PRACTITIONER

## 2020-06-04 PROCEDURE — 3008F BODY MASS INDEX DOCD: CPT | Mod: CPTII,S$GLB,, | Performed by: NURSE PRACTITIONER

## 2020-06-04 PROCEDURE — 99214 PR OFFICE/OUTPT VISIT, EST, LEVL IV, 30-39 MIN: ICD-10-PCS | Mod: S$GLB,,, | Performed by: NURSE PRACTITIONER

## 2020-06-04 PROCEDURE — 3078F DIAST BP <80 MM HG: CPT | Mod: CPTII,S$GLB,, | Performed by: NURSE PRACTITIONER

## 2020-06-04 PROCEDURE — 99999 PR PBB SHADOW E&M-EST. PATIENT-LVL IV: ICD-10-PCS | Mod: PBBFAC,,, | Performed by: NURSE PRACTITIONER

## 2020-06-04 PROCEDURE — 3078F PR MOST RECENT DIASTOLIC BLOOD PRESSURE < 80 MM HG: ICD-10-PCS | Mod: CPTII,S$GLB,, | Performed by: NURSE PRACTITIONER

## 2020-06-04 RX ORDER — QUETIAPINE FUMARATE 200 MG/1
200 TABLET, FILM COATED ORAL DAILY
Qty: 90 TABLET | Refills: 1 | Status: SHIPPED | OUTPATIENT
Start: 2020-06-04 | End: 2021-02-18

## 2020-06-04 RX ORDER — ALPRAZOLAM 1 MG/1
TABLET ORAL
Qty: 90 TABLET | Refills: 5 | Status: SHIPPED | OUTPATIENT
Start: 2020-06-04 | End: 2020-11-30

## 2020-06-04 NOTE — PATIENT INSTRUCTIONS
4 Steps for Eating Healthier  Changing the way you eat can improve your health. It can lower your cholesterol and blood pressure, and help you stay at a healthy weight. Your diet doesnt have to be bland and boring to be healthy. Just watch your calories and follow these steps:    1. Eat fewer unhealthy fats  · Choose more fish and lean meats instead of fatty cuts of meat.  · Skip butter and lard, and use less margarine.  · Pass on foods that have palm, coconut, or hydrogenated oils.  · Eat fewer high-fat dairy foods like cheese, ice cream, and whole milk.  · Get a heart-healthy cookbook and try some low-fat recipes.  2. Go light on salt  · Keep the saltshaker off the table.  · Limit high-salt ingredients, such as soy sauce, bouillon, and garlic salt.  · Instead of adding salt when cooking, season your food with herbs and flavorings. Try lemon, garlic, and onion.  · Limit convenience foods, such as boxed or canned foods and restaurant food.  · Read food labels and choose lower-sodium options.  3. Limit sugar  · Pause before you add sugars to pancakes, cereal, coffee, or tea. This includes white and brown table sugar, syrup, honey, and molasses. Cut your usual amount by half.  · Use non-sugar sweeteners. Stevia, aspartame, and sucralose can satisfy a sweet tooth without adding calories.  · Swap out sugar-filled soda and other drinks. Buy sugar-free or low-calorie beverages. Remember water is always the best choice.  · Read labels and choose foods with less added sugar. Keep in mind that dairy foods and foods with fruit will have some natural sugar.  · Cut the sugar in recipes by 1/3 to 1/2. Boost the flavor with extracts like almond, vanilla, or orange. Or add spices such as cinnamon or nutmeg.  4. Eat more fiber  · Eat fresh fruits and vegetables every day.  · Boost your diet with whole grains. Go for oats, whole-grain rice, and bran.  · Add beans and lentils to your meals.  · Drink more water to match your fiber  increase. This is to help prevent constipation.  Date Last Reviewed: 5/11/2015  © 0855-6595 [employer]. [address] [place] [address], [place] [address]. All rights reserved. This information is not intended as a substitute for professional medical care. Always follow your healthcare professional's instructions.

## 2020-06-04 NOTE — PROGRESS NOTES
Subjective:       Patient ID: Lyndsey Gan is a 54 y.o. female.    Chief Complaint: Follow-up (x 6 months)    Patient is known, to me and presents with   Chief Complaint   Patient presents with    Follow-up     x 6 months   .  Denies chest pain and shortness of breath.  Patient presents with check up and labs. Doing well with smoking cessation. Mood is stable and meds are working  HPI  Review of Systems   Constitutional: Negative for activity change, appetite change, fatigue, fever and unexpected weight change.   HENT: Negative for congestion, ear discharge, ear pain, hearing loss, postnasal drip and tinnitus.    Eyes: Negative for photophobia, pain and visual disturbance.   Respiratory: Negative for cough, shortness of breath, wheezing and stridor.    Cardiovascular: Negative for chest pain, palpitations and leg swelling.   Gastrointestinal: Negative for abdominal distention.   Genitourinary: Negative for difficulty urinating, dysuria, frequency, hematuria and urgency.   Musculoskeletal: Negative for arthralgias, back pain, gait problem, joint swelling and neck pain.   Skin: Negative.    Neurological: Negative for dizziness, seizures, syncope, weakness, light-headedness, numbness and headaches.   Hematological: Negative for adenopathy. Does not bruise/bleed easily.   Psychiatric/Behavioral: Negative for behavioral problems, confusion, dysphoric mood, hallucinations, sleep disturbance and suicidal ideas. The patient is not nervous/anxious.        Objective:      Physical Exam   Constitutional: She is oriented to person, place, and time. She appears well-developed and well-nourished. No distress.   HENT:   Head: Normocephalic and atraumatic.   Right Ear: External ear normal.   Left Ear: External ear normal.   Mouth/Throat: Oropharynx is clear and moist. No oropharyngeal exudate.   Eyes: Pupils are equal, round, and reactive to light. Conjunctivae and EOM are normal. Right eye exhibits no discharge. Left eye  exhibits no discharge.   Neck: Normal range of motion. Neck supple. No JVD present. No thyromegaly present.   Cardiovascular: Normal rate, regular rhythm, normal heart sounds and intact distal pulses. Exam reveals no gallop and no friction rub.   No murmur heard.  Pulmonary/Chest: Effort normal and breath sounds normal. No stridor. No respiratory distress. She has no wheezes. She has no rales. She exhibits no tenderness.   Abdominal: Soft. Bowel sounds are normal. She exhibits no distension and no mass. There is no tenderness. There is no rebound.   Musculoskeletal: Normal range of motion. She exhibits no edema or tenderness.   Lymphadenopathy:     She has no cervical adenopathy.   Neurological: She is alert and oriented to person, place, and time. She has normal reflexes. She displays normal reflexes. No cranial nerve deficit. She exhibits normal muscle tone. Coordination normal.   Skin: Skin is warm and dry. Capillary refill takes less than 2 seconds. No rash noted. No erythema. No pallor.   Psychiatric: She has a normal mood and affect. Her behavior is normal. Judgment and thought content normal.   Negative si/hi noted       Assessment:       1. Benign essential HTN    2. Hyperlipidemia LDL goal <70    3. Bipolar depression    4. BILL (generalized anxiety disorder)    5. Obesity (BMI 30-39.9)    6. Tobacco abuse    7. Tobacco abuse counseling    8. Insomnia, unspecified type    9. Anxiety        Plan:   Lyndsey was seen today for follow-up.    Diagnoses and all orders for this visit:    Benign essential HTN  -     CBC auto differential; Future  -     Comprehensive metabolic panel; Future  -     Microalbumin/creatinine urine ratio; Future    Hyperlipidemia LDL goal <70  -     CBC auto differential; Future  -     Comprehensive metabolic panel; Future  -     TSH; Future  -     Lipid Panel; Future    Bipolar depression  -     CBC auto differential; Future  -     Comprehensive metabolic panel; Future  -     QUEtiapine  "(SEROQUEL) 200 MG Tab; Take 1 tablet (200 mg total) by mouth once daily.  -     ALPRAZolam (XANAX) 1 MG tablet; Take one tablet by mouth three times a day as needed    BILL (generalized anxiety disorder)  -     CBC auto differential; Future  -     Comprehensive metabolic panel; Future    Obesity (BMI 30-39.9)  -     CBC auto differential; Future  -     Comprehensive metabolic panel; Future    Tobacco abuse  -     CBC auto differential; Future  -     Comprehensive metabolic panel; Future    Tobacco abuse counseling    Insomnia, unspecified type  -     QUEtiapine (SEROQUEL) 200 MG Tab; Take 1 tablet (200 mg total) by mouth once daily.    Anxiety  -     ALPRAZolam (XANAX) 1 MG tablet; Take one tablet by mouth three times a day as needed    "This note will not be shared with the patient."  Continue with plan of care  Lab drawn today CBC, CMP, TSH, FLP  Limit the cholesterol in your diet to less than 300 mg per day.  Fats should contribute no more than 20 to 35% of your daily calories.  Less than 7 to 10% of your calories should come from saturated fat.  Avoid saturated fat products e.g., butter, some oils, meat, and poultry fat contain a lot of saturated fat.  Check food labels for fat and cholesterol content. Choose the foods with less fat per serving.  Limit the amount of butter and margarine you eat.  Use salad dressings and margarine made with polyunsaturated and monunsaturated fats.  Use egg whites or egg substitutes rather than whole eggs.  Replace whole-milk dairy products with nonfat or low-fat mild, cheese, spreads, and yogurt.  Eat skinless chicken, turkey, fish, and meatless entrees more often than red meat.  Choose lean cuts of meat and trim off all visible fat. Keep portion sizes moderate.  Avoid fatty desserts such as ice cream, cream-filled cakes, and cheesecakes. Choose fresh fruits, nonfat frozen yogurt, Popsicles, etc.  Reduce the amount of fried foods, vending machine food, and fast food you eat.  Eat " fruits and vegetables (especially fresh fruits and leafy vegetables), beans, and whole grains daily. The fiber in these foods helps lower cholesterol.  Look for low-fat or nonfat varieties of the foods you like to eat or look for substitutes.  You may need to exercise 60 minutes a day to prevent weight gain and 90 minutes a day to lose weight.  RTC in six months.

## 2020-07-09 ENCOUNTER — TELEPHONE (OUTPATIENT)
Dept: OBSTETRICS AND GYNECOLOGY | Facility: CLINIC | Age: 55
End: 2020-07-09

## 2020-07-09 DIAGNOSIS — Z12.31 BREAST CANCER SCREENING BY MAMMOGRAM: Primary | ICD-10-CM

## 2020-07-09 NOTE — TELEPHONE ENCOUNTER
----- Message from Flavia Shook MA sent at 7/9/2020  3:33 PM CDT -----  Contact: padma Gan  MRN: [id number]  Home Phone      [phone]  Work Phone      Not on file.  Mobile          [phone]    Patient Care Team:  Delbert Sanchez MD as PCP - General (Family Medicine)  Robert Norman MD as PCP - [facility] Attributed  Ya Escamilla LPN as Care Coordinator  OB? No  What phone number can you be reached at?  [phone]  Message:   Please link mammo orders to appt 08/11/20.  Annual scheduled same day.

## 2020-08-10 ENCOUNTER — PATIENT OUTREACH (OUTPATIENT)
Dept: ADMINISTRATIVE | Facility: OTHER | Age: 55
End: 2020-08-10

## 2020-08-10 DIAGNOSIS — Z12.11 ENCOUNTER FOR FIT (FECAL IMMUNOCHEMICAL TEST) SCREENING: Primary | ICD-10-CM

## 2020-08-10 NOTE — PROGRESS NOTES
Requested updates within Care Everywhere.  Patient's chart was reviewed for overdue AQUILES topics.  Immunizations reconciled.    Orders placed: FIT kit  Tasked appts:  Labs Linked:

## 2020-08-11 ENCOUNTER — OFFICE VISIT (OUTPATIENT)
Dept: OBSTETRICS AND GYNECOLOGY | Facility: CLINIC | Age: 55
End: 2020-08-11
Payer: COMMERCIAL

## 2020-08-11 ENCOUNTER — HOSPITAL ENCOUNTER (OUTPATIENT)
Dept: RADIOLOGY | Facility: HOSPITAL | Age: 55
Discharge: HOME | End: 2020-08-11
Attending: OBSTETRICS & GYNECOLOGY
Payer: COMMERCIAL

## 2020-08-11 VITALS — BODY MASS INDEX: 30.21 KG/M2 | WEIGHT: 160 LBS | HEIGHT: 61 IN

## 2020-08-11 VITALS
HEIGHT: 61 IN | WEIGHT: 166 LBS | DIASTOLIC BLOOD PRESSURE: 72 MMHG | BODY MASS INDEX: 31.34 KG/M2 | HEART RATE: 80 BPM | SYSTOLIC BLOOD PRESSURE: 110 MMHG | RESPIRATION RATE: 17 BRPM

## 2020-08-11 DIAGNOSIS — Z12.31 BREAST CANCER SCREENING BY MAMMOGRAM: ICD-10-CM

## 2020-08-11 DIAGNOSIS — Z01.419 WELL WOMAN EXAM WITH ROUTINE GYNECOLOGICAL EXAM: Primary | ICD-10-CM

## 2020-08-11 PROCEDURE — 77063 MAMMO DIGITAL SCREENING BILAT WITH TOMOSYNTHESIS_CAD: ICD-10-PCS | Mod: 26,,, | Performed by: RADIOLOGY

## 2020-08-11 PROCEDURE — 77063 BREAST TOMOSYNTHESIS BI: CPT | Mod: 26,,, | Performed by: RADIOLOGY

## 2020-08-11 PROCEDURE — 99396 PREV VISIT EST AGE 40-64: CPT | Mod: S$GLB,,, | Performed by: ADVANCED PRACTICE MIDWIFE

## 2020-08-11 PROCEDURE — 77067 SCR MAMMO BI INCL CAD: CPT | Mod: 26,,, | Performed by: RADIOLOGY

## 2020-08-11 PROCEDURE — 99999 PR PBB SHADOW E&M-EST. PATIENT-LVL III: ICD-10-PCS | Mod: PBBFAC,,, | Performed by: ADVANCED PRACTICE MIDWIFE

## 2020-08-11 PROCEDURE — 3008F BODY MASS INDEX DOCD: CPT | Mod: CPTII,S$GLB,, | Performed by: ADVANCED PRACTICE MIDWIFE

## 2020-08-11 PROCEDURE — 77067 MAMMO DIGITAL SCREENING BILAT WITH TOMOSYNTHESIS_CAD: ICD-10-PCS | Mod: 26,,, | Performed by: RADIOLOGY

## 2020-08-11 PROCEDURE — 3074F PR MOST RECENT SYSTOLIC BLOOD PRESSURE < 130 MM HG: ICD-10-PCS | Mod: CPTII,S$GLB,, | Performed by: ADVANCED PRACTICE MIDWIFE

## 2020-08-11 PROCEDURE — 3078F DIAST BP <80 MM HG: CPT | Mod: CPTII,S$GLB,, | Performed by: ADVANCED PRACTICE MIDWIFE

## 2020-08-11 PROCEDURE — 99999 PR PBB SHADOW E&M-EST. PATIENT-LVL III: CPT | Mod: PBBFAC,,, | Performed by: ADVANCED PRACTICE MIDWIFE

## 2020-08-11 PROCEDURE — 77067 SCR MAMMO BI INCL CAD: CPT | Mod: TC

## 2020-08-11 PROCEDURE — 3074F SYST BP LT 130 MM HG: CPT | Mod: CPTII,S$GLB,, | Performed by: ADVANCED PRACTICE MIDWIFE

## 2020-08-11 PROCEDURE — 3078F PR MOST RECENT DIASTOLIC BLOOD PRESSURE < 80 MM HG: ICD-10-PCS | Mod: CPTII,S$GLB,, | Performed by: ADVANCED PRACTICE MIDWIFE

## 2020-08-11 PROCEDURE — 3008F PR BODY MASS INDEX (BMI) DOCUMENTED: ICD-10-PCS | Mod: CPTII,S$GLB,, | Performed by: ADVANCED PRACTICE MIDWIFE

## 2020-08-11 PROCEDURE — 99396 PR PREVENTIVE VISIT,EST,40-64: ICD-10-PCS | Mod: S$GLB,,, | Performed by: ADVANCED PRACTICE MIDWIFE

## 2020-08-11 NOTE — PROGRESS NOTES
"  Subjective:    Patient ID: Lyndsey Gan is a 55 y.o. y.o. female.     Chief Complaint: Annual Well Woman Exam     History of Present Illness:  Lyndsey presents today for Annual Well Woman exam. She describes her menses as hysterectomy .She denies pelvic pain.  She denies breast tenderness, masses, nipple discharge. She denies difficulty with urination or bowel movements. She admits to menopausal symptoms such as hotflashes, some  vaginal dryness, and no night sweats. She denies bloating, early satiety, or weight changes. She is sexually active.   MMG NORMAL, HAS FIT KIT     The following portions of the patient's history were reviewed and updated as appropriate: allergies, current medications, past family history, past medical history, past social history, past surgical history and problem list.      Menstrual History:   No LMP recorded. Patient has had a hysterectomy..     OB History        1    Para   1    Term   1            AB        Living   1       SAB        TAB        Ectopic        Multiple        Live Births   1                 The following portions of the patient's history were reviewed and updated as appropriate: allergies, current medications, past family history, past medical history, past social history, past surgical history and problem list.        ROS:     Review of Systems   Genitourinary: Negative for dyspareunia, pelvic pain, vaginal bleeding and vaginal dryness.   Musculoskeletal: Positive for arthralgias and myalgias.   Psychiatric/Behavioral: Positive for depression.   All other systems reviewed and are negative.      Objective:    Vital Signs:  Vitals:    20 1540   BP: 110/72   Pulse: 80   Resp: 17   Weight: 75.3 kg (166 lb)   Height: 5' 1" (1.549 m)         Physical Exam:  General:  alert, cooperative, appears stated age   Skin:  Skin color, texture, turgor normal. No rashes or lesions   HEENT:  conjunctivae/corneas clear. PERRL.   Neck: supple, trachea midline, " no adenopathy or thyromegally   Respiratory:  clear to auscultation bilaterally   Heart:  regular rate and rhythm, S1, S2 normal, no murmur, click, rub or gallop   Breasts:  no discharge, erythema, or tenderness, self-exam is taught and encouraged   Abdomen:  normal findings: bowel sounds normal, no masses palpable, no organomegaly and soft, non-tender   Pelvis: External genitalia: normal general appearance  Urinary system: urethral meatus normal, bladder nontender  Vaginal: normal mucosa without prolapse or lesions  Cervix: normal appearance  Uterus: removed surgically  Adnexa: normal size, nontender bilaterally   Extremities: Normal ROM; no edema, no cyanosis   Neurologial: Normal strength and tone. No focal numbness or weakness. Reflexes 2+ and equal.   Psychiatric: normal mood, speech, dress, and thought processes         Assessment:       Healthy female exam.     1. Well woman exam with routine gynecological exam          Plan:       All questions answered.  Breast self exam technique reviewed and patient encouraged to perform self-exam monthly.  Follow up in 1 year.  Follow up as needed.     COUNSELING:  Lyndsey was counseled on STD pevention, use and side-effects of various contraceptive measures, A.C.O.G. Pap guidelines and recommendations for yearly pelvic exams in addition to recommendations for monthly self breast exams; to see her PCP for other health maintenance.

## 2020-08-25 DIAGNOSIS — F41.9 ANXIETY: ICD-10-CM

## 2020-08-25 RX ORDER — ARIPIPRAZOLE 5 MG/1
5 TABLET ORAL DAILY
Qty: 90 TABLET | Refills: 1 | Status: SHIPPED | OUTPATIENT
Start: 2020-08-25 | End: 2021-02-18

## 2020-10-07 ENCOUNTER — HOSPITAL ENCOUNTER (OUTPATIENT)
Dept: RADIOLOGY | Facility: HOSPITAL | Age: 55
Discharge: HOME | End: 2020-10-07
Attending: NURSE PRACTITIONER
Payer: COMMERCIAL

## 2020-10-07 ENCOUNTER — OFFICE VISIT (OUTPATIENT)
Dept: INTERNAL MEDICINE | Facility: CLINIC | Age: 55
End: 2020-10-07
Payer: COMMERCIAL

## 2020-10-07 VITALS
TEMPERATURE: 98 F | HEART RATE: 92 BPM | WEIGHT: 163.56 LBS | BODY MASS INDEX: 30.88 KG/M2 | OXYGEN SATURATION: 98 % | RESPIRATION RATE: 18 BRPM | HEIGHT: 61 IN | SYSTOLIC BLOOD PRESSURE: 112 MMHG | DIASTOLIC BLOOD PRESSURE: 80 MMHG

## 2020-10-07 DIAGNOSIS — M77.31 HEEL SPUR, RIGHT: ICD-10-CM

## 2020-10-07 DIAGNOSIS — M79.671 PAIN OF RIGHT HEEL: ICD-10-CM

## 2020-10-07 DIAGNOSIS — M79.671 PAIN OF RIGHT HEEL: Primary | ICD-10-CM

## 2020-10-07 PROBLEM — E78.5 HYPERLIPIDEMIA LDL GOAL <70: Status: RESOLVED | Noted: 2017-12-19 | Resolved: 2020-10-07

## 2020-10-07 PROBLEM — F41.9 ANXIETY: Status: RESOLVED | Noted: 2017-12-19 | Resolved: 2020-10-07

## 2020-10-07 PROBLEM — Z01.419 WELL WOMAN EXAM WITH ROUTINE GYNECOLOGICAL EXAM: Status: RESOLVED | Noted: 2019-06-13 | Resolved: 2020-10-07

## 2020-10-07 PROCEDURE — 3008F BODY MASS INDEX DOCD: CPT | Mod: CPTII,S$GLB,, | Performed by: NURSE PRACTITIONER

## 2020-10-07 PROCEDURE — 99214 OFFICE O/P EST MOD 30 MIN: CPT | Mod: 25,S$GLB,, | Performed by: NURSE PRACTITIONER

## 2020-10-07 PROCEDURE — 73650 X-RAY EXAM OF HEEL: CPT | Mod: 26,RT,, | Performed by: RADIOLOGY

## 2020-10-07 PROCEDURE — 96372 PR INJECTION,THERAP/PROPH/DIAG2ST, IM OR SUBCUT: ICD-10-PCS | Mod: S$GLB,,, | Performed by: NURSE PRACTITIONER

## 2020-10-07 PROCEDURE — 99214 PR OFFICE/OUTPT VISIT, EST, LEVL IV, 30-39 MIN: ICD-10-PCS | Mod: 25,S$GLB,, | Performed by: NURSE PRACTITIONER

## 2020-10-07 PROCEDURE — 73650 XR CALCANEUS 2 VIEW RIGHT: ICD-10-PCS | Mod: 26,RT,, | Performed by: RADIOLOGY

## 2020-10-07 PROCEDURE — 3008F PR BODY MASS INDEX (BMI) DOCUMENTED: ICD-10-PCS | Mod: CPTII,S$GLB,, | Performed by: NURSE PRACTITIONER

## 2020-10-07 PROCEDURE — 3074F PR MOST RECENT SYSTOLIC BLOOD PRESSURE < 130 MM HG: ICD-10-PCS | Mod: CPTII,S$GLB,, | Performed by: NURSE PRACTITIONER

## 2020-10-07 PROCEDURE — 96372 THER/PROPH/DIAG INJ SC/IM: CPT | Mod: S$GLB,,, | Performed by: NURSE PRACTITIONER

## 2020-10-07 PROCEDURE — 3074F SYST BP LT 130 MM HG: CPT | Mod: CPTII,S$GLB,, | Performed by: NURSE PRACTITIONER

## 2020-10-07 PROCEDURE — 99999 PR PBB SHADOW E&M-EST. PATIENT-LVL IV: CPT | Mod: PBBFAC,,, | Performed by: NURSE PRACTITIONER

## 2020-10-07 PROCEDURE — 3079F PR MOST RECENT DIASTOLIC BLOOD PRESSURE 80-89 MM HG: ICD-10-PCS | Mod: CPTII,S$GLB,, | Performed by: NURSE PRACTITIONER

## 2020-10-07 PROCEDURE — 73650 X-RAY EXAM OF HEEL: CPT | Mod: TC,RT

## 2020-10-07 PROCEDURE — 3079F DIAST BP 80-89 MM HG: CPT | Mod: CPTII,S$GLB,, | Performed by: NURSE PRACTITIONER

## 2020-10-07 PROCEDURE — 99999 PR PBB SHADOW E&M-EST. PATIENT-LVL IV: ICD-10-PCS | Mod: PBBFAC,,, | Performed by: NURSE PRACTITIONER

## 2020-10-07 RX ORDER — METHYLPREDNISOLONE 4 MG/1
TABLET ORAL
Qty: 1 PACKAGE | Refills: 0 | Status: SHIPPED | OUTPATIENT
Start: 2020-10-07 | End: 2020-10-28

## 2020-10-07 RX ORDER — KETOROLAC TROMETHAMINE 30 MG/ML
60 INJECTION, SOLUTION INTRAMUSCULAR; INTRAVENOUS
Status: COMPLETED | OUTPATIENT
Start: 2020-10-07 | End: 2020-10-07

## 2020-10-07 RX ORDER — MELOXICAM 15 MG/1
15 TABLET ORAL DAILY
Qty: 30 TABLET | Refills: 0 | Status: SHIPPED | OUTPATIENT
Start: 2020-10-07 | End: 2020-10-07 | Stop reason: SDUPTHER

## 2020-10-07 RX ORDER — METHYLPREDNISOLONE 4 MG/1
TABLET ORAL
Qty: 1 PACKAGE | Refills: 0 | Status: SHIPPED | OUTPATIENT
Start: 2020-10-07 | End: 2020-10-07 | Stop reason: SDUPTHER

## 2020-10-07 RX ORDER — METHYLPREDNISOLONE ACETATE 80 MG/ML
80 INJECTION, SUSPENSION INTRA-ARTICULAR; INTRALESIONAL; INTRAMUSCULAR; SOFT TISSUE
Status: COMPLETED | OUTPATIENT
Start: 2020-10-07 | End: 2020-10-07

## 2020-10-07 RX ORDER — MELOXICAM 15 MG/1
15 TABLET ORAL DAILY
Qty: 30 TABLET | Refills: 0 | Status: SHIPPED | OUTPATIENT
Start: 2020-10-07 | End: 2020-12-15 | Stop reason: SDUPTHER

## 2020-10-07 RX ADMIN — KETOROLAC TROMETHAMINE 60 MG: 30 INJECTION, SOLUTION INTRAMUSCULAR; INTRAVENOUS at 03:10

## 2020-10-07 RX ADMIN — METHYLPREDNISOLONE ACETATE 80 MG: 80 INJECTION, SUSPENSION INTRA-ARTICULAR; INTRALESIONAL; INTRAMUSCULAR; SOFT TISSUE at 03:10

## 2020-10-07 NOTE — PATIENT INSTRUCTIONS
Understanding Heel Pain    Your heel is the back part of your foot. A band of tissue called the plantar fascia connects the heel bone to the bones in the ball of your foot. Nerves run from the heel up the inside of your ankle and into your leg. When you feel pain in the bottom of your heel, the plantar fascia may be inflamed. Overuse, Achilles tightness, or excess body weight can cause the tissue to tear or pull away from the bone. Sometimes the inflamed plantar fascia also irritates a nerve, causing more pain.  What causes heel pain?  Wearing shoes with poor cushioning can irritate the tissue in your heel (plantar fascia). Being overweight or standing for long periods can also irritate the tissue. Running, walking, tennis, and other sports that put stress on the heels can cause tiny tears in the tissue. If your lower leg muscles are tight, this is more likely to occur. A tight Achilles tendon will also contribute to heel pain.  Symptoms  You may feel pain on the bottom or on the inside edge of your heel. The pain may be sharp when you get out of bed or when you stand up after sitting for a while. You may feel a dull ache in your heel after youve been standing for a long time on a hard surface. Running can also cause a dull ache.  Preventing future problems  To prevent future heel pain, wear shoes with well-cushioned heels. And do exercises prescribed by your healthcare provider to stretch the plantar fascia and the muscles in the lower leg.   Date Last Reviewed: 9/10/2015  © 8713-5558 [employer]. [address] [place] [address], [place] [address]. All rights reserved. This information is not intended as a substitute for professional medical care. Always follow your healthcare professional's instructions.

## 2020-10-07 NOTE — PROGRESS NOTES
Subjective:       Patient ID: Lyndsey Gan is a 55 y.o. female.    Chief Complaint: Heel Pain (R.side- spur? x 2 months PS;9)    New to me but seen previously in clinic by a fellow provider. Pt presents with right heel pain that has progressively worsened over last 2 months. Reports pain began after changing work shoes. 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 Pain worse at the end of the day and improved with rest.   Foot Injury   The incident occurred more than 1 week ago. The incident occurred at work. There was no injury mechanism. The pain is present in the right foot. The quality of the pain is described as aching and stabbing. The pain is at a severity of 9/10. The pain is severe. The pain has been fluctuating since onset. Associated symptoms include an inability to bear weight. Pertinent negatives include no loss of motion, loss of sensation, muscle weakness, numbness or tingling. She reports no foreign bodies present. The symptoms are aggravated by weight bearing and palpation. She has tried ice, elevation, rest and NSAIDs for the symptoms. The treatment provided moderate relief.     Review of Systems   Constitutional: Negative for activity change, appetite change, fever and unexpected weight change.   Respiratory: Negative for chest tightness and shortness of breath.    Cardiovascular: Negative for chest pain and palpitations.   Gastrointestinal: Negative for abdominal pain, constipation, diarrhea, nausea and vomiting.   Genitourinary: Negative for difficulty urinating, dysuria and menstrual problem.   Musculoskeletal: Positive for arthralgias and gait problem. Negative for back pain, joint swelling, leg pain, myalgias, neck pain, neck stiffness and joint deformity.   Neurological: Negative for tingling, numbness and headaches.   All other systems reviewed and are negative.        Objective:      Physical Exam  Vitals signs and nursing note reviewed.   Constitutional:        General: She is not in acute distress.     Appearance: Normal appearance. She is well-developed, well-groomed and normal weight. She is not ill-appearing.   HENT:      Head: Normocephalic and atraumatic.      Right Ear: External ear normal.      Left Ear: External ear normal.      Nose: Nose normal.      Mouth/Throat:      Lips: Pink.      Mouth: Mucous membranes are moist.   Eyes:      General: Lids are normal. No scleral icterus.        Right eye: No discharge.         Left eye: No discharge.      Conjunctiva/sclera: Conjunctivae normal.   Neck:      Musculoskeletal: Neck supple.      Trachea: Phonation normal.   Cardiovascular:      Pulses:           Dorsalis pedis pulses are 2+ on the right side and 2+ on the left side.        Posterior tibial pulses are 2+ on the right side and 2+ on the left side.   Pulmonary:      Effort: Pulmonary effort is normal. No accessory muscle usage or respiratory distress.   Abdominal:      General: Abdomen is flat. There is no distension.   Musculoskeletal:      Right ankle: Normal.      Left ankle: Normal.      Right foot: Normal range of motion and normal capillary refill. Tenderness present. No bony tenderness, swelling, crepitus, deformity, laceration, bunion, Charcot foot, foot drop or prominent metatarsal heads.      Left foot: Normal.        Feet:    Feet:      Right foot:      Skin integrity: Skin integrity normal.      Toenail Condition: Right toenails are normal.      Left foot:      Skin integrity: Skin integrity normal.      Toenail Condition: Left toenails are normal.   Skin:     General: Skin is warm and dry.      Findings: No rash.   Neurological:      General: No focal deficit present.      Mental Status: She is alert and oriented to person, place, and time. Mental status is at baseline.      Motor: No abnormal muscle tone.      Gait: Gait normal.   Psychiatric:         Attention and Perception: Attention and perception normal.         Mood and Affect: Mood and  affect normal.         Speech: Speech normal.         Behavior: Behavior normal. Behavior is cooperative.         Thought Content: Thought content normal.         Cognition and Memory: Cognition and memory normal.         Judgment: Judgment normal.         Assessment:       1. Pain of right heel        Plan:       1. Pain of right heel  Natural history and expected course discussed. Questions answered.  Educational materials distributed.  NSAIDs per medication orders.  OTC analgesics as needed.  X-rays per orders.  consider podiatry referral if fails to improve or worsens  - X-Ray Calcaneus 2 View Right; Future  - methylPREDNISolone acetate injection 80 mg  - ketorolac injection 60 mg  - meloxicam (MOBIC) 15 MG tablet; Take 1 tablet (15 mg total) by mouth once daily.  Dispense: 30 tablet; Refill: 0  - methylPREDNISolone (MEDROL DOSEPACK) 4 mg tablet; use as directed  Dispense: 1 Package; Refill: 0           GEENA Fang, FNP-C  Family/Internal Medicine  [facility]

## 2020-10-30 ENCOUNTER — OFFICE VISIT (OUTPATIENT)
Dept: INTERNAL MEDICINE | Facility: CLINIC | Age: 55
End: 2020-10-30
Payer: COMMERCIAL

## 2020-10-30 VITALS
OXYGEN SATURATION: 96 % | DIASTOLIC BLOOD PRESSURE: 74 MMHG | SYSTOLIC BLOOD PRESSURE: 116 MMHG | WEIGHT: 161.19 LBS | TEMPERATURE: 98 F | RESPIRATION RATE: 20 BRPM | HEIGHT: 61 IN | HEART RATE: 89 BPM | BODY MASS INDEX: 30.43 KG/M2

## 2020-10-30 DIAGNOSIS — N39.0 URINARY TRACT INFECTION WITH HEMATURIA, SITE UNSPECIFIED: Primary | ICD-10-CM

## 2020-10-30 DIAGNOSIS — R31.9 URINARY TRACT INFECTION WITH HEMATURIA, SITE UNSPECIFIED: Primary | ICD-10-CM

## 2020-10-30 LAB
BILIRUB SERPL-MCNC: ABNORMAL MG/DL
BLOOD URINE, POC: 250
CLARITY, POC UA: ABNORMAL
COLOR, POC UA: YELLOW
GLUCOSE UR QL STRIP: NORMAL
KETONES UR QL STRIP: ABNORMAL
LEUKOCYTE ESTERASE URINE, POC: ABNORMAL
NITRITE, POC UA: ABNORMAL
PH, POC UA: 5
PROTEIN, POC: ABNORMAL
SPECIFIC GRAVITY, POC UA: 1.01
UROBILINOGEN, POC UA: NORMAL

## 2020-10-30 PROCEDURE — 3074F PR MOST RECENT SYSTOLIC BLOOD PRESSURE < 130 MM HG: ICD-10-PCS | Mod: CPTII,S$GLB,, | Performed by: NURSE PRACTITIONER

## 2020-10-30 PROCEDURE — 3008F PR BODY MASS INDEX (BMI) DOCUMENTED: ICD-10-PCS | Mod: CPTII,S$GLB,, | Performed by: NURSE PRACTITIONER

## 2020-10-30 PROCEDURE — 3008F BODY MASS INDEX DOCD: CPT | Mod: CPTII,S$GLB,, | Performed by: NURSE PRACTITIONER

## 2020-10-30 PROCEDURE — 81002 URINALYSIS NONAUTO W/O SCOPE: CPT | Mod: S$GLB,,, | Performed by: NURSE PRACTITIONER

## 2020-10-30 PROCEDURE — 3078F DIAST BP <80 MM HG: CPT | Mod: CPTII,S$GLB,, | Performed by: NURSE PRACTITIONER

## 2020-10-30 PROCEDURE — 87077 CULTURE AEROBIC IDENTIFY: CPT

## 2020-10-30 PROCEDURE — 99999 PR PBB SHADOW E&M-EST. PATIENT-LVL IV: CPT | Mod: PBBFAC,,, | Performed by: NURSE PRACTITIONER

## 2020-10-30 PROCEDURE — 99213 PR OFFICE/OUTPT VISIT, EST, LEVL III, 20-29 MIN: ICD-10-PCS | Mod: S$GLB,25,, | Performed by: NURSE PRACTITIONER

## 2020-10-30 PROCEDURE — 99999 PR PBB SHADOW E&M-EST. PATIENT-LVL IV: ICD-10-PCS | Mod: PBBFAC,,, | Performed by: NURSE PRACTITIONER

## 2020-10-30 PROCEDURE — 3078F PR MOST RECENT DIASTOLIC BLOOD PRESSURE < 80 MM HG: ICD-10-PCS | Mod: CPTII,S$GLB,, | Performed by: NURSE PRACTITIONER

## 2020-10-30 PROCEDURE — 87086 URINE CULTURE/COLONY COUNT: CPT

## 2020-10-30 PROCEDURE — 87186 SC STD MICRODIL/AGAR DIL: CPT

## 2020-10-30 PROCEDURE — 87088 URINE BACTERIA CULTURE: CPT

## 2020-10-30 PROCEDURE — 99213 OFFICE O/P EST LOW 20 MIN: CPT | Mod: S$GLB,25,, | Performed by: NURSE PRACTITIONER

## 2020-10-30 PROCEDURE — 3074F SYST BP LT 130 MM HG: CPT | Mod: CPTII,S$GLB,, | Performed by: NURSE PRACTITIONER

## 2020-10-30 PROCEDURE — 81002 POCT URINE DIPSTICK WITHOUT MICROSCOPE: ICD-10-PCS | Mod: S$GLB,,, | Performed by: NURSE PRACTITIONER

## 2020-10-30 RX ORDER — PHENAZOPYRIDINE HYDROCHLORIDE 100 MG/1
100 TABLET, FILM COATED ORAL 3 TIMES DAILY PRN
Qty: 30 TABLET | Refills: 0 | Status: SHIPPED | OUTPATIENT
Start: 2020-10-30 | End: 2020-11-09

## 2020-10-30 RX ORDER — NITROFURANTOIN (MACROCRYSTALS) 100 MG/1
100 CAPSULE ORAL EVERY 12 HOURS
Qty: 14 CAPSULE | Refills: 0 | Status: SHIPPED | OUTPATIENT
Start: 2020-10-30 | End: 2020-11-06

## 2020-10-30 NOTE — PATIENT INSTRUCTIONS

## 2020-10-30 NOTE — PROGRESS NOTES
Subjective:       Patient ID: Lyndsey Gan is a 55 y.o. female.    Chief Complaint: Urinary Tract Infection (x 1 wk- pressure and lower back pain PS:9)    Patient is known, to me and presents with   Chief Complaint   Patient presents with    Urinary Tract Infection     x 1 wk- pressure and lower back pain PS:9   .  Denies chest pain and shortness of breath.  Patient presents with UTI s/s for the past week  HPI  Review of Systems   Constitutional: Negative.    Respiratory: Negative.    Cardiovascular: Negative.    Genitourinary: Positive for dysuria, frequency and urgency.   Musculoskeletal: Negative.    Skin: Negative.        Objective:      Physical Exam  Constitutional:       Appearance: Normal appearance.   Neck:      Musculoskeletal: Normal range of motion and neck supple. No neck rigidity or muscular tenderness.      Vascular: No carotid bruit.   Cardiovascular:      Rate and Rhythm: Normal rate and regular rhythm.      Heart sounds: Normal heart sounds. No murmur.   Pulmonary:      Effort: Pulmonary effort is normal.      Breath sounds: Normal breath sounds. No wheezing or rhonchi.   Genitourinary:     Comments: See dip  Lymphadenopathy:      Cervical: No cervical adenopathy.   Skin:     General: Skin is warm and dry.      Capillary Refill: Capillary refill takes less than 2 seconds.      Coloration: Skin is not jaundiced or pale.      Findings: No bruising, erythema, lesion or rash.   Neurological:      Mental Status: She is alert.         Assessment:       1. Urinary tract infection with hematuria, site unspecified        Plan:   Lyndsey was seen today for urinary tract infection.    Diagnoses and all orders for this visit:    Urinary tract infection with hematuria, site unspecified  -     POCT URINE DIPSTICK WITHOUT MICROSCOPE  -     Urine culture; Future  -     nitrofurantoin (MACRODANTIN) 100 MG capsule; Take 1 capsule (100 mg total) by mouth every 12 (twelve) hours. for 7 days  -      "phenazopyridine (PYRIDIUM) 100 MG tablet; Take 1 tablet (100 mg total) by mouth 3 (three) times daily as needed for Pain.    "This note will not be shared with the patient."  Keep hydrated  rtc as scheduled  "

## 2020-11-02 LAB — BACTERIA UR CULT: ABNORMAL

## 2020-11-30 DIAGNOSIS — F41.9 ANXIETY: ICD-10-CM

## 2020-11-30 DIAGNOSIS — F31.9 BIPOLAR DEPRESSION: ICD-10-CM

## 2020-11-30 RX ORDER — ALPRAZOLAM 1 MG/1
TABLET ORAL
Qty: 90 TABLET | Refills: 5 | Status: SHIPPED | OUTPATIENT
Start: 2020-11-30 | End: 2021-05-18 | Stop reason: SDUPTHER

## 2020-12-04 ENCOUNTER — CLINICAL SUPPORT (OUTPATIENT)
Dept: INTERNAL MEDICINE | Facility: CLINIC | Age: 55
End: 2020-12-04
Payer: COMMERCIAL

## 2020-12-04 DIAGNOSIS — E66.9 OBESITY (BMI 30-39.9): ICD-10-CM

## 2020-12-04 DIAGNOSIS — F31.9 BIPOLAR DEPRESSION: ICD-10-CM

## 2020-12-04 DIAGNOSIS — I10 BENIGN ESSENTIAL HTN: ICD-10-CM

## 2020-12-04 DIAGNOSIS — Z72.0 TOBACCO ABUSE: ICD-10-CM

## 2020-12-04 DIAGNOSIS — F41.1 GAD (GENERALIZED ANXIETY DISORDER): ICD-10-CM

## 2020-12-04 DIAGNOSIS — E78.5 HYPERLIPIDEMIA LDL GOAL <70: ICD-10-CM

## 2020-12-04 LAB
ALBUMIN SERPL BCP-MCNC: 4.5 G/DL (ref 3.5–5.2)
ALBUMIN/CREAT UR: 14.1 UG/MG (ref 0–30)
ALP SERPL-CCNC: 72 U/L (ref 55–135)
ALT SERPL W/O P-5'-P-CCNC: 25 U/L (ref 10–44)
ANION GAP SERPL CALC-SCNC: 9 MMOL/L (ref 8–16)
AST SERPL-CCNC: 29 U/L (ref 10–40)
BASOPHILS # BLD AUTO: 0.03 K/UL (ref 0–0.2)
BASOPHILS NFR BLD: 0.5 % (ref 0–1.9)
BILIRUB SERPL-MCNC: 0.9 MG/DL (ref 0.1–1)
BUN SERPL-MCNC: 10 MG/DL (ref 6–20)
CALCIUM SERPL-MCNC: 9.8 MG/DL (ref 8.7–10.5)
CHLORIDE SERPL-SCNC: 104 MMOL/L (ref 95–110)
CHOLEST SERPL-MCNC: 251 MG/DL (ref 120–199)
CHOLEST/HDLC SERPL: 4.3 {RATIO} (ref 2–5)
CO2 SERPL-SCNC: 27 MMOL/L (ref 23–29)
CREAT SERPL-MCNC: 0.7 MG/DL (ref 0.5–1.4)
CREAT UR-MCNC: 85 MG/DL (ref 15–325)
DIFFERENTIAL METHOD: ABNORMAL
EOSINOPHIL # BLD AUTO: 0.1 K/UL (ref 0–0.5)
EOSINOPHIL NFR BLD: 1.7 % (ref 0–8)
ERYTHROCYTE [DISTWIDTH] IN BLOOD BY AUTOMATED COUNT: 12.1 % (ref 11.5–14.5)
EST. GFR  (AFRICAN AMERICAN): >60 ML/MIN/1.73 M^2
EST. GFR  (NON AFRICAN AMERICAN): >60 ML/MIN/1.73 M^2
GLUCOSE SERPL-MCNC: 105 MG/DL (ref 70–110)
HCT VFR BLD AUTO: 42.7 % (ref 37–48.5)
HDLC SERPL-MCNC: 59 MG/DL (ref 40–75)
HDLC SERPL: 23.5 % (ref 20–50)
HGB BLD-MCNC: 13.8 G/DL (ref 12–16)
IMM GRANULOCYTES # BLD AUTO: 0.02 K/UL (ref 0–0.04)
IMM GRANULOCYTES NFR BLD AUTO: 0.3 % (ref 0–0.5)
LDLC SERPL CALC-MCNC: 164 MG/DL (ref 63–159)
LYMPHOCYTES # BLD AUTO: 1.6 K/UL (ref 1–4.8)
LYMPHOCYTES NFR BLD: 23.9 % (ref 18–48)
MCH RBC QN AUTO: 31.8 PG (ref 27–31)
MCHC RBC AUTO-ENTMCNC: 32.3 G/DL (ref 32–36)
MCV RBC AUTO: 98 FL (ref 82–98)
MICROALBUMIN UR DL<=1MG/L-MCNC: 12 UG/ML
MONOCYTES # BLD AUTO: 0.6 K/UL (ref 0.3–1)
MONOCYTES NFR BLD: 8.6 % (ref 4–15)
NEUTROPHILS # BLD AUTO: 4.3 K/UL (ref 1.8–7.7)
NEUTROPHILS NFR BLD: 65 % (ref 38–73)
NONHDLC SERPL-MCNC: 192 MG/DL
NRBC BLD-RTO: 0 /100 WBC
PLATELET # BLD AUTO: 177 K/UL (ref 150–350)
PMV BLD AUTO: 10.6 FL (ref 9.2–12.9)
POTASSIUM SERPL-SCNC: 3.9 MMOL/L (ref 3.5–5.1)
PROT SERPL-MCNC: 7.5 G/DL (ref 6–8.4)
RBC # BLD AUTO: 4.34 M/UL (ref 4–5.4)
SODIUM SERPL-SCNC: 140 MMOL/L (ref 136–145)
TRIGL SERPL-MCNC: 140 MG/DL (ref 30–150)
TSH SERPL DL<=0.005 MIU/L-ACNC: 1.19 UIU/ML (ref 0.4–4)
WBC # BLD AUTO: 6.64 K/UL (ref 3.9–12.7)

## 2020-12-04 PROCEDURE — 80061 LIPID PANEL: CPT

## 2020-12-04 PROCEDURE — 84443 ASSAY THYROID STIM HORMONE: CPT

## 2020-12-04 PROCEDURE — 80053 COMPREHEN METABOLIC PANEL: CPT

## 2020-12-04 PROCEDURE — 36415 COLL VENOUS BLD VENIPUNCTURE: CPT

## 2020-12-04 PROCEDURE — 82043 UR ALBUMIN QUANTITATIVE: CPT

## 2020-12-04 PROCEDURE — 85025 COMPLETE CBC W/AUTO DIFF WBC: CPT

## 2020-12-15 ENCOUNTER — OFFICE VISIT (OUTPATIENT)
Dept: INTERNAL MEDICINE | Facility: CLINIC | Age: 55
End: 2020-12-15
Payer: COMMERCIAL

## 2020-12-15 VITALS
HEIGHT: 61 IN | SYSTOLIC BLOOD PRESSURE: 112 MMHG | TEMPERATURE: 98 F | HEART RATE: 76 BPM | DIASTOLIC BLOOD PRESSURE: 74 MMHG | OXYGEN SATURATION: 95 % | RESPIRATION RATE: 18 BRPM | BODY MASS INDEX: 30.68 KG/M2 | WEIGHT: 162.5 LBS

## 2020-12-15 DIAGNOSIS — F31.9 BIPOLAR DEPRESSION: ICD-10-CM

## 2020-12-15 DIAGNOSIS — I10 BENIGN ESSENTIAL HTN: Primary | ICD-10-CM

## 2020-12-15 DIAGNOSIS — M79.671 PAIN OF RIGHT HEEL: ICD-10-CM

## 2020-12-15 DIAGNOSIS — E78.5 HYPERLIPIDEMIA LDL GOAL <70: ICD-10-CM

## 2020-12-15 DIAGNOSIS — E66.9 OBESITY (BMI 30-39.9): ICD-10-CM

## 2020-12-15 DIAGNOSIS — F41.1 GAD (GENERALIZED ANXIETY DISORDER): ICD-10-CM

## 2020-12-15 PROCEDURE — 99999 PR PBB SHADOW E&M-EST. PATIENT-LVL IV: ICD-10-PCS | Mod: PBBFAC,,, | Performed by: NURSE PRACTITIONER

## 2020-12-15 PROCEDURE — 3074F PR MOST RECENT SYSTOLIC BLOOD PRESSURE < 130 MM HG: ICD-10-PCS | Mod: CPTII,S$GLB,, | Performed by: NURSE PRACTITIONER

## 2020-12-15 PROCEDURE — 1126F PR PAIN SEVERITY QUANTIFIED, NO PAIN PRESENT: ICD-10-PCS | Mod: S$GLB,,, | Performed by: NURSE PRACTITIONER

## 2020-12-15 PROCEDURE — 99999 PR PBB SHADOW E&M-EST. PATIENT-LVL IV: CPT | Mod: PBBFAC,,, | Performed by: NURSE PRACTITIONER

## 2020-12-15 PROCEDURE — 1126F AMNT PAIN NOTED NONE PRSNT: CPT | Mod: S$GLB,,, | Performed by: NURSE PRACTITIONER

## 2020-12-15 PROCEDURE — 3078F DIAST BP <80 MM HG: CPT | Mod: CPTII,S$GLB,, | Performed by: NURSE PRACTITIONER

## 2020-12-15 PROCEDURE — 99214 OFFICE O/P EST MOD 30 MIN: CPT | Mod: S$GLB,,, | Performed by: NURSE PRACTITIONER

## 2020-12-15 PROCEDURE — 3008F BODY MASS INDEX DOCD: CPT | Mod: CPTII,S$GLB,, | Performed by: NURSE PRACTITIONER

## 2020-12-15 PROCEDURE — 3074F SYST BP LT 130 MM HG: CPT | Mod: CPTII,S$GLB,, | Performed by: NURSE PRACTITIONER

## 2020-12-15 PROCEDURE — 3008F PR BODY MASS INDEX (BMI) DOCUMENTED: ICD-10-PCS | Mod: CPTII,S$GLB,, | Performed by: NURSE PRACTITIONER

## 2020-12-15 PROCEDURE — 99214 PR OFFICE/OUTPT VISIT, EST, LEVL IV, 30-39 MIN: ICD-10-PCS | Mod: S$GLB,,, | Performed by: NURSE PRACTITIONER

## 2020-12-15 PROCEDURE — 3078F PR MOST RECENT DIASTOLIC BLOOD PRESSURE < 80 MM HG: ICD-10-PCS | Mod: CPTII,S$GLB,, | Performed by: NURSE PRACTITIONER

## 2020-12-15 RX ORDER — ROSUVASTATIN CALCIUM 5 MG/1
5 TABLET, COATED ORAL NIGHTLY
Qty: 30 TABLET | Refills: 5 | Status: SHIPPED | OUTPATIENT
Start: 2020-12-15 | End: 2021-05-13

## 2020-12-15 RX ORDER — MELOXICAM 15 MG/1
15 TABLET ORAL DAILY
Qty: 30 TABLET | Refills: 5 | Status: SHIPPED | OUTPATIENT
Start: 2020-12-15 | End: 2021-05-17 | Stop reason: SDUPTHER

## 2020-12-15 NOTE — PATIENT INSTRUCTIONS
4 Steps for Eating Healthier  Changing the way you eat can improve your health. It can lower your cholesterol and blood pressure, and help you stay at a healthy weight. Your diet doesnt have to be bland and boring to be healthy. Just watch your calories and follow these steps:    1. Eat fewer unhealthy fats  · Choose more fish and lean meats instead of fatty cuts of meat.  · Skip butter and lard, and use less margarine.  · Pass on foods that have palm, coconut, or hydrogenated oils.  · Eat fewer high-fat dairy foods like cheese, ice cream, and whole milk.  · Get a heart-healthy cookbook and try some low-fat recipes.  2. Go light on salt  · Keep the saltshaker off the table.  · Limit high-salt ingredients, such as soy sauce, bouillon, and garlic salt.  · Instead of adding salt when cooking, season your food with herbs and flavorings. Try lemon, garlic, and onion.  · Limit convenience foods, such as boxed or canned foods and restaurant food.  · Read food labels and choose lower-sodium options.  3. Limit sugar  · Pause before you add sugars to pancakes, cereal, coffee, or tea. This includes white and brown table sugar, syrup, honey, and molasses. Cut your usual amount by half.  · Use non-sugar sweeteners. Stevia, aspartame, and sucralose can satisfy a sweet tooth without adding calories.  · Swap out sugar-filled soda and other drinks. Buy sugar-free or low-calorie beverages. Remember water is always the best choice.  · Read labels and choose foods with less added sugar. Keep in mind that dairy foods and foods with fruit will have some natural sugar.  · Cut the sugar in recipes by 1/3 to 1/2. Boost the flavor with extracts like almond, vanilla, or orange. Or add spices such as cinnamon or nutmeg.  4. Eat more fiber  · Eat fresh fruits and vegetables every day.  · Boost your diet with whole grains. Go for oats, whole-grain rice, and bran.  · Add beans and lentils to your meals.  · Drink more water to match your fiber  increase. This is to help prevent constipation.  Date Last Reviewed: 5/11/2015  © 7169-6137 [employer]. [address] [place] [address], [place] [address]. All rights reserved. This information is not intended as a substitute for professional medical care. Always follow your healthcare professional's instructions.

## 2020-12-15 NOTE — PROGRESS NOTES
Subjective:       Patient ID: Lyndsey Gan is a 55 y.o. female.    Chief Complaint: Follow-up (6 months)    Patient is known, to me and presents with   Chief Complaint   Patient presents with    Follow-up     6 months   .  Denies chest pain and shortness of breath.  Patient presents with checkup and labs. Does not want colon cancer screen. Up to date with mammogram  HPI  Review of Systems   Constitutional: Negative for activity change, appetite change, fatigue, fever and unexpected weight change.   HENT: Negative for congestion, ear discharge, ear pain, hearing loss, postnasal drip and tinnitus.    Eyes: Negative for photophobia, pain and visual disturbance.   Respiratory: Negative for cough, shortness of breath, wheezing and stridor.    Cardiovascular: Negative for chest pain, palpitations and leg swelling.   Gastrointestinal: Negative for abdominal distention.   Genitourinary: Negative for difficulty urinating, dysuria, frequency, hematuria and urgency.   Musculoskeletal: Positive for arthralgias. Negative for back pain, gait problem, joint swelling and neck pain.   Skin: Negative.    Neurological: Negative for dizziness, seizures, syncope, weakness, light-headedness, numbness and headaches.   Hematological: Negative for adenopathy. Does not bruise/bleed easily.   Psychiatric/Behavioral: Negative for behavioral problems, confusion, dysphoric mood, hallucinations, sleep disturbance and suicidal ideas. The patient is not nervous/anxious.        Objective:      Physical Exam  Constitutional:       General: She is not in acute distress.     Appearance: She is well-developed.   HENT:      Head: Normocephalic and atraumatic.      Right Ear: External ear normal.      Left Ear: External ear normal.      Mouth/Throat:      Pharynx: No oropharyngeal exudate.   Eyes:      General:         Right eye: No discharge.         Left eye: No discharge.      Conjunctiva/sclera: Conjunctivae normal.      Pupils: Pupils are  equal, round, and reactive to light.   Neck:      Musculoskeletal: Normal range of motion and neck supple.      Thyroid: No thyromegaly.      Vascular: No JVD.   Cardiovascular:      Rate and Rhythm: Normal rate and regular rhythm.      Heart sounds: Normal heart sounds. No murmur. No friction rub. No gallop.    Pulmonary:      Effort: Pulmonary effort is normal. No respiratory distress.      Breath sounds: Normal breath sounds. No stridor. No wheezing or rales.   Chest:      Chest wall: No tenderness.   Abdominal:      General: Bowel sounds are normal. There is no distension.      Palpations: Abdomen is soft. There is no mass.      Tenderness: There is no abdominal tenderness. There is no rebound.   Musculoskeletal: Normal range of motion.         General: No swelling, tenderness, deformity or signs of injury.      Right lower leg: No edema.   Lymphadenopathy:      Cervical: No cervical adenopathy.   Skin:     General: Skin is warm and dry.      Capillary Refill: Capillary refill takes less than 2 seconds.      Coloration: Skin is not jaundiced or pale.      Findings: No bruising, erythema, lesion or rash.   Neurological:      Mental Status: She is alert and oriented to person, place, and time.      Cranial Nerves: No cranial nerve deficit.      Motor: No abnormal muscle tone.      Coordination: Coordination normal.      Deep Tendon Reflexes: Reflexes are normal and symmetric. Reflexes normal.   Psychiatric:         Behavior: Behavior normal.         Thought Content: Thought content normal.         Judgment: Judgment normal.         Assessment:       1. Benign essential HTN    2. Hyperlipidemia LDL goal <70    3. Bipolar depression    4. BILL (generalized anxiety disorder)    5. Pain of right heel    6. Obesity (BMI 30-39.9)        Plan:   Lyndsey was seen today for follow-up.    Diagnoses and all orders for this visit:    Benign essential HTN  -     CBC Auto Differential; Future  -     Comprehensive Metabolic  "Panel; Future  -     Microalbumin/Creatinine Ratio, Urine; Future    Hyperlipidemia LDL goal <70  -     rosuvastatin (CRESTOR) 5 MG tablet; Take 1 tablet (5 mg total) by mouth every evening.  -     CBC Auto Differential; Future  -     Comprehensive Metabolic Panel; Future  -     TSH; Future  -     Lipid Panel; Future  -     Hepatic Function Panel; Future    Bipolar depression  -     CBC Auto Differential; Future  -     Comprehensive Metabolic Panel; Future    BILL (generalized anxiety disorder)  -     CBC Auto Differential; Future  -     Comprehensive Metabolic Panel; Future    Pain of right heel  -     meloxicam (MOBIC) 15 MG tablet; Take 1 tablet (15 mg total) by mouth once daily.  -     CBC Auto Differential; Future  -     Comprehensive Metabolic Panel; Future    Obesity (BMI 30-39.9)  -     CBC Auto Differential; Future  -     Comprehensive Metabolic Panel; Future    "This note will not be shared with the patient."  Lab drawn today CBC, CMP, TSH, FLP  Limit the cholesterol in your diet to less than 300 mg per day.  Fats should contribute no more than 20 to 35% of your daily calories.  Less than 7 to 10% of your calories should come from saturated fat.  Avoid saturated fat products e.g., butter, some oils, meat, and poultry fat contain a lot of saturated fat.  Check food labels for fat and cholesterol content. Choose the foods with less fat per serving.  Limit the amount of butter and margarine you eat.  Use salad dressings and margarine made with polyunsaturated and monunsaturated fats.  Use egg whites or egg substitutes rather than whole eggs.  Replace whole-milk dairy products with nonfat or low-fat mild, cheese, spreads, and yogurt.  Eat skinless chicken, turkey, fish, and meatless entrees more often than red meat.  Choose lean cuts of meat and trim off all visible fat. Keep portion sizes moderate.  Avoid fatty desserts such as ice cream, cream-filled cakes, and cheesecakes. Choose fresh fruits, nonfat frozen " yogurt, Popsicles, etc.  Reduce the amount of fried foods, vending machine food, and fast food you eat.  Eat fruits and vegetables (especially fresh fruits and leafy vegetables), beans, and whole grains daily. The fiber in these foods helps lower cholesterol.  Look for low-fat or nonfat varieties of the foods you like to eat or look for substitutes.  You may need to exercise 60 minutes a day to prevent weight gain and 90 minutes a day to lose weight.  RTC in six months.

## 2021-01-19 ENCOUNTER — CLINICAL SUPPORT (OUTPATIENT)
Dept: INTERNAL MEDICINE | Facility: CLINIC | Age: 56
End: 2021-01-19
Payer: COMMERCIAL

## 2021-01-19 DIAGNOSIS — E78.5 HYPERLIPIDEMIA LDL GOAL <70: ICD-10-CM

## 2021-01-19 LAB
ALBUMIN SERPL BCP-MCNC: 4 G/DL (ref 3.5–5.2)
ALP SERPL-CCNC: 58 U/L (ref 55–135)
ALT SERPL W/O P-5'-P-CCNC: 21 U/L (ref 10–44)
AST SERPL-CCNC: 25 U/L (ref 10–40)
BILIRUB DIRECT SERPL-MCNC: 0.2 MG/DL (ref 0.1–0.3)
BILIRUB SERPL-MCNC: 0.5 MG/DL (ref 0.1–1)
PROT SERPL-MCNC: 6.9 G/DL (ref 6–8.4)

## 2021-01-19 PROCEDURE — 80076 HEPATIC FUNCTION PANEL: CPT

## 2021-01-26 ENCOUNTER — TELEPHONE (OUTPATIENT)
Dept: INTERNAL MEDICINE | Facility: CLINIC | Age: 56
End: 2021-01-26

## 2021-02-16 DIAGNOSIS — G47.00 INSOMNIA, UNSPECIFIED TYPE: ICD-10-CM

## 2021-02-16 DIAGNOSIS — F31.9 BIPOLAR DEPRESSION: ICD-10-CM

## 2021-02-16 DIAGNOSIS — F41.9 ANXIETY: ICD-10-CM

## 2021-02-18 RX ORDER — ARIPIPRAZOLE 5 MG/1
TABLET ORAL
Qty: 90 TABLET | Refills: 1 | Status: SHIPPED | OUTPATIENT
Start: 2021-02-18 | End: 2021-08-16

## 2021-02-18 RX ORDER — QUETIAPINE FUMARATE 200 MG/1
TABLET, FILM COATED ORAL
Qty: 90 TABLET | Refills: 1 | Status: SHIPPED | OUTPATIENT
Start: 2021-02-18 | End: 2021-08-06

## 2021-05-12 DIAGNOSIS — E78.5 HYPERLIPIDEMIA LDL GOAL <70: ICD-10-CM

## 2021-05-13 RX ORDER — ROSUVASTATIN CALCIUM 5 MG/1
TABLET, COATED ORAL
Qty: 90 TABLET | Refills: 1 | Status: SHIPPED | OUTPATIENT
Start: 2021-05-13 | End: 2021-09-08 | Stop reason: SDUPTHER

## 2021-05-18 ENCOUNTER — TELEPHONE (OUTPATIENT)
Dept: INTERNAL MEDICINE | Facility: CLINIC | Age: 56
End: 2021-05-18

## 2021-05-18 DIAGNOSIS — F41.9 ANXIETY: ICD-10-CM

## 2021-05-18 DIAGNOSIS — F31.9 BIPOLAR DEPRESSION: ICD-10-CM

## 2021-05-18 RX ORDER — ALPRAZOLAM 1 MG/1
TABLET ORAL
Qty: 90 TABLET | Refills: 5 | Status: SHIPPED | OUTPATIENT
Start: 2021-05-18 | End: 2021-10-12 | Stop reason: SDUPTHER

## 2021-06-11 ENCOUNTER — TELEPHONE (OUTPATIENT)
Dept: INTERNAL MEDICINE | Facility: CLINIC | Age: 56
End: 2021-06-11

## 2021-06-11 DIAGNOSIS — N30.00 ACUTE CYSTITIS WITHOUT HEMATURIA: Primary | ICD-10-CM

## 2021-06-11 RX ORDER — NITROFURANTOIN (MACROCRYSTALS) 100 MG/1
100 CAPSULE ORAL EVERY 12 HOURS
Qty: 14 CAPSULE | Refills: 0 | Status: SHIPPED | OUTPATIENT
Start: 2021-06-11 | End: 2021-06-18

## 2021-06-15 ENCOUNTER — CLINICAL SUPPORT (OUTPATIENT)
Dept: INTERNAL MEDICINE | Facility: CLINIC | Age: 56
End: 2021-06-15
Payer: COMMERCIAL

## 2021-06-15 DIAGNOSIS — E78.5 HYPERLIPIDEMIA LDL GOAL <70: ICD-10-CM

## 2021-06-15 DIAGNOSIS — M79.671 PAIN OF RIGHT HEEL: ICD-10-CM

## 2021-06-15 DIAGNOSIS — F31.9 BIPOLAR DEPRESSION: ICD-10-CM

## 2021-06-15 DIAGNOSIS — E66.9 OBESITY (BMI 30-39.9): ICD-10-CM

## 2021-06-15 DIAGNOSIS — F41.1 GAD (GENERALIZED ANXIETY DISORDER): ICD-10-CM

## 2021-06-15 DIAGNOSIS — I10 BENIGN ESSENTIAL HTN: ICD-10-CM

## 2021-06-15 LAB
ALBUMIN SERPL BCP-MCNC: 4.3 G/DL (ref 3.5–5.2)
ALP SERPL-CCNC: 64 U/L (ref 55–135)
ALT SERPL W/O P-5'-P-CCNC: 17 U/L (ref 10–44)
ANION GAP SERPL CALC-SCNC: 9 MMOL/L (ref 8–16)
AST SERPL-CCNC: 23 U/L (ref 10–40)
BASOPHILS # BLD AUTO: 0.05 K/UL (ref 0–0.2)
BASOPHILS NFR BLD: 0.9 % (ref 0–1.9)
BILIRUB SERPL-MCNC: 0.8 MG/DL (ref 0.1–1)
BUN SERPL-MCNC: 15 MG/DL (ref 6–20)
CALCIUM SERPL-MCNC: 10 MG/DL (ref 8.7–10.5)
CHLORIDE SERPL-SCNC: 104 MMOL/L (ref 95–110)
CHOLEST SERPL-MCNC: 165 MG/DL (ref 120–199)
CHOLEST/HDLC SERPL: 3.3 {RATIO} (ref 2–5)
CO2 SERPL-SCNC: 26 MMOL/L (ref 23–29)
CREAT SERPL-MCNC: 0.8 MG/DL (ref 0.5–1.4)
DIFFERENTIAL METHOD: ABNORMAL
EOSINOPHIL # BLD AUTO: 0.3 K/UL (ref 0–0.5)
EOSINOPHIL NFR BLD: 5 % (ref 0–8)
ERYTHROCYTE [DISTWIDTH] IN BLOOD BY AUTOMATED COUNT: 11.7 % (ref 11.5–14.5)
EST. GFR  (AFRICAN AMERICAN): >60 ML/MIN/1.73 M^2
EST. GFR  (NON AFRICAN AMERICAN): >60 ML/MIN/1.73 M^2
GLUCOSE SERPL-MCNC: 105 MG/DL (ref 70–110)
HCT VFR BLD AUTO: 40.2 % (ref 37–48.5)
HDLC SERPL-MCNC: 50 MG/DL (ref 40–75)
HDLC SERPL: 30.3 % (ref 20–50)
HGB BLD-MCNC: 13.3 G/DL (ref 12–16)
IMM GRANULOCYTES # BLD AUTO: 0.02 K/UL (ref 0–0.04)
IMM GRANULOCYTES NFR BLD AUTO: 0.3 % (ref 0–0.5)
LDLC SERPL CALC-MCNC: 76.2 MG/DL (ref 63–159)
LYMPHOCYTES # BLD AUTO: 1.8 K/UL (ref 1–4.8)
LYMPHOCYTES NFR BLD: 30.1 % (ref 18–48)
MCH RBC QN AUTO: 32 PG (ref 27–31)
MCHC RBC AUTO-ENTMCNC: 33.1 G/DL (ref 32–36)
MCV RBC AUTO: 97 FL (ref 82–98)
MONOCYTES # BLD AUTO: 0.6 K/UL (ref 0.3–1)
MONOCYTES NFR BLD: 10.3 % (ref 4–15)
NEUTROPHILS # BLD AUTO: 3.1 K/UL (ref 1.8–7.7)
NEUTROPHILS NFR BLD: 53.4 % (ref 38–73)
NONHDLC SERPL-MCNC: 115 MG/DL
NRBC BLD-RTO: 0 /100 WBC
PLATELET # BLD AUTO: 149 K/UL (ref 150–450)
PMV BLD AUTO: 11.4 FL (ref 9.2–12.9)
POTASSIUM SERPL-SCNC: 4.1 MMOL/L (ref 3.5–5.1)
PROT SERPL-MCNC: 7.2 G/DL (ref 6–8.4)
RBC # BLD AUTO: 4.15 M/UL (ref 4–5.4)
SODIUM SERPL-SCNC: 139 MMOL/L (ref 136–145)
TRIGL SERPL-MCNC: 194 MG/DL (ref 30–150)
TSH SERPL DL<=0.005 MIU/L-ACNC: 2.07 UIU/ML (ref 0.4–4)
WBC # BLD AUTO: 5.84 K/UL (ref 3.9–12.7)

## 2021-06-15 PROCEDURE — 80061 LIPID PANEL: CPT | Performed by: NURSE PRACTITIONER

## 2021-06-15 PROCEDURE — 85025 COMPLETE CBC W/AUTO DIFF WBC: CPT | Performed by: NURSE PRACTITIONER

## 2021-06-15 PROCEDURE — 80053 COMPREHEN METABOLIC PANEL: CPT | Performed by: NURSE PRACTITIONER

## 2021-06-15 PROCEDURE — 84443 ASSAY THYROID STIM HORMONE: CPT | Performed by: NURSE PRACTITIONER

## 2021-06-21 ENCOUNTER — OFFICE VISIT (OUTPATIENT)
Dept: INTERNAL MEDICINE | Facility: CLINIC | Age: 56
End: 2021-06-21
Payer: COMMERCIAL

## 2021-06-21 VITALS
SYSTOLIC BLOOD PRESSURE: 136 MMHG | HEART RATE: 80 BPM | BODY MASS INDEX: 29.92 KG/M2 | DIASTOLIC BLOOD PRESSURE: 86 MMHG | WEIGHT: 158.5 LBS | HEIGHT: 61 IN | OXYGEN SATURATION: 96 % | RESPIRATION RATE: 18 BRPM

## 2021-06-21 DIAGNOSIS — E66.3 OVERWEIGHT (BMI 25.0-29.9): ICD-10-CM

## 2021-06-21 DIAGNOSIS — E78.5 HYPERLIPIDEMIA LDL GOAL <70: ICD-10-CM

## 2021-06-21 DIAGNOSIS — G47.00 INSOMNIA, UNSPECIFIED TYPE: ICD-10-CM

## 2021-06-21 DIAGNOSIS — Z72.0 TOBACCO ABUSE: ICD-10-CM

## 2021-06-21 DIAGNOSIS — I10 BENIGN ESSENTIAL HTN: Primary | ICD-10-CM

## 2021-06-21 DIAGNOSIS — Z12.11 COLON CANCER SCREENING: ICD-10-CM

## 2021-06-21 DIAGNOSIS — N30.01 ACUTE CYSTITIS WITH HEMATURIA: ICD-10-CM

## 2021-06-21 DIAGNOSIS — F31.9 BIPOLAR DEPRESSION: ICD-10-CM

## 2021-06-21 PROCEDURE — 3079F DIAST BP 80-89 MM HG: CPT | Mod: CPTII,S$GLB,, | Performed by: NURSE PRACTITIONER

## 2021-06-21 PROCEDURE — 3079F PR MOST RECENT DIASTOLIC BLOOD PRESSURE 80-89 MM HG: ICD-10-PCS | Mod: CPTII,S$GLB,, | Performed by: NURSE PRACTITIONER

## 2021-06-21 PROCEDURE — 81002 URINALYSIS NONAUTO W/O SCOPE: CPT | Mod: S$GLB,,, | Performed by: NURSE PRACTITIONER

## 2021-06-21 PROCEDURE — 99214 PR OFFICE/OUTPT VISIT, EST, LEVL IV, 30-39 MIN: ICD-10-PCS | Mod: 25,S$GLB,, | Performed by: NURSE PRACTITIONER

## 2021-06-21 PROCEDURE — 3008F BODY MASS INDEX DOCD: CPT | Mod: CPTII,S$GLB,, | Performed by: NURSE PRACTITIONER

## 2021-06-21 PROCEDURE — 99214 OFFICE O/P EST MOD 30 MIN: CPT | Mod: 25,S$GLB,, | Performed by: NURSE PRACTITIONER

## 2021-06-21 PROCEDURE — 81002 POCT URINE DIPSTICK WITHOUT MICROSCOPE: ICD-10-PCS | Mod: S$GLB,,, | Performed by: NURSE PRACTITIONER

## 2021-06-21 PROCEDURE — 1126F PR PAIN SEVERITY QUANTIFIED, NO PAIN PRESENT: ICD-10-PCS | Mod: S$GLB,,, | Performed by: NURSE PRACTITIONER

## 2021-06-21 PROCEDURE — 3075F SYST BP GE 130 - 139MM HG: CPT | Mod: CPTII,S$GLB,, | Performed by: NURSE PRACTITIONER

## 2021-06-21 PROCEDURE — 87086 URINE CULTURE/COLONY COUNT: CPT | Performed by: NURSE PRACTITIONER

## 2021-06-21 PROCEDURE — 99999 PR PBB SHADOW E&M-EST. PATIENT-LVL IV: ICD-10-PCS | Mod: PBBFAC,,, | Performed by: NURSE PRACTITIONER

## 2021-06-21 PROCEDURE — 99999 PR PBB SHADOW E&M-EST. PATIENT-LVL IV: CPT | Mod: PBBFAC,,, | Performed by: NURSE PRACTITIONER

## 2021-06-21 PROCEDURE — 3008F PR BODY MASS INDEX (BMI) DOCUMENTED: ICD-10-PCS | Mod: CPTII,S$GLB,, | Performed by: NURSE PRACTITIONER

## 2021-06-21 PROCEDURE — 3075F PR MOST RECENT SYSTOLIC BLOOD PRESS GE 130-139MM HG: ICD-10-PCS | Mod: CPTII,S$GLB,, | Performed by: NURSE PRACTITIONER

## 2021-06-21 PROCEDURE — 1126F AMNT PAIN NOTED NONE PRSNT: CPT | Mod: S$GLB,,, | Performed by: NURSE PRACTITIONER

## 2021-06-21 RX ORDER — NITROFURANTOIN (MACROCRYSTALS) 100 MG/1
100 CAPSULE ORAL EVERY 12 HOURS
Qty: 10 CAPSULE | Refills: 0 | Status: SHIPPED | OUTPATIENT
Start: 2021-06-21 | End: 2021-06-26

## 2021-06-22 LAB
BACTERIA UR CULT: NO GROWTH
BILIRUB SERPL-MCNC: ABNORMAL MG/DL
BLOOD URINE, POC: 250
CLARITY, POC UA: CLEAR
COLOR, POC UA: YELLOW
GLUCOSE UR QL STRIP: NORMAL
KETONES UR QL STRIP: ABNORMAL
LEUKOCYTE ESTERASE URINE, POC: ABNORMAL
NITRITE, POC UA: ABNORMAL
PH, POC UA: 6
PROTEIN, POC: ABNORMAL
SPECIFIC GRAVITY, POC UA: 1
UROBILINOGEN, POC UA: NORMAL

## 2021-06-29 ENCOUNTER — TELEPHONE (OUTPATIENT)
Dept: INTERNAL MEDICINE | Facility: CLINIC | Age: 56
End: 2021-06-29

## 2021-06-29 RX ORDER — ALBUTEROL SULFATE 90 UG/1
2 AEROSOL, METERED RESPIRATORY (INHALATION) EVERY 6 HOURS PRN
Qty: 18 G | Refills: 0 | Status: SHIPPED | OUTPATIENT
Start: 2021-06-29 | End: 2021-12-09

## 2021-08-06 DIAGNOSIS — F31.9 BIPOLAR DEPRESSION: ICD-10-CM

## 2021-08-06 DIAGNOSIS — G47.00 INSOMNIA, UNSPECIFIED TYPE: ICD-10-CM

## 2021-08-06 RX ORDER — QUETIAPINE FUMARATE 200 MG/1
TABLET, FILM COATED ORAL
Qty: 90 TABLET | Refills: 1 | Status: SHIPPED | OUTPATIENT
Start: 2021-08-06 | End: 2022-01-04

## 2021-08-11 DIAGNOSIS — F41.9 ANXIETY: ICD-10-CM

## 2021-08-16 RX ORDER — ARIPIPRAZOLE 5 MG/1
TABLET ORAL
Qty: 90 TABLET | Refills: 1 | Status: SHIPPED | OUTPATIENT
Start: 2021-08-16 | End: 2022-02-03

## 2021-08-18 DIAGNOSIS — Z12.31 OTHER SCREENING MAMMOGRAM: ICD-10-CM

## 2021-09-03 LAB — NONINV COLON CA DNA+OCC BLD SCRN STL QL: NEGATIVE

## 2021-10-11 ENCOUNTER — TELEPHONE (OUTPATIENT)
Dept: INTERNAL MEDICINE | Facility: CLINIC | Age: 56
End: 2021-10-11

## 2021-10-11 DIAGNOSIS — F31.9 BIPOLAR DEPRESSION: ICD-10-CM

## 2021-10-11 DIAGNOSIS — F41.9 ANXIETY: ICD-10-CM

## 2021-10-12 RX ORDER — ALPRAZOLAM 1 MG/1
TABLET ORAL
Qty: 90 TABLET | Refills: 5 | Status: SHIPPED | OUTPATIENT
Start: 2021-10-12 | End: 2021-11-01 | Stop reason: SDUPTHER

## 2021-11-01 ENCOUNTER — TELEPHONE (OUTPATIENT)
Dept: INTERNAL MEDICINE | Facility: CLINIC | Age: 56
End: 2021-11-01
Payer: COMMERCIAL

## 2021-11-01 DIAGNOSIS — M79.671 PAIN OF RIGHT HEEL: ICD-10-CM

## 2021-11-01 DIAGNOSIS — F41.9 ANXIETY: ICD-10-CM

## 2021-11-01 DIAGNOSIS — E78.5 HYPERLIPIDEMIA LDL GOAL <70: ICD-10-CM

## 2021-11-01 DIAGNOSIS — F31.9 BIPOLAR DEPRESSION: ICD-10-CM

## 2021-11-01 RX ORDER — MELOXICAM 15 MG/1
15 TABLET ORAL DAILY
Qty: 30 TABLET | Refills: 5 | Status: SHIPPED | OUTPATIENT
Start: 2021-11-01 | End: 2021-11-11 | Stop reason: SDUPTHER

## 2021-11-01 RX ORDER — ALPRAZOLAM 1 MG/1
TABLET ORAL
Qty: 90 TABLET | Refills: 5 | Status: SHIPPED | OUTPATIENT
Start: 2021-11-01 | End: 2021-11-08 | Stop reason: SDUPTHER

## 2021-11-01 RX ORDER — ROSUVASTATIN CALCIUM 5 MG/1
5 TABLET, COATED ORAL NIGHTLY
Qty: 90 TABLET | Refills: 1 | Status: SHIPPED | OUTPATIENT
Start: 2021-11-01 | End: 2021-11-11 | Stop reason: SDUPTHER

## 2021-11-02 NOTE — PATIENT INSTRUCTIONS
4 Steps for Eating Healthier  Changing the way you eat can improve your health. It can lower your cholesterol and blood pressure, and help you stay at a healthy weight. Your diet doesnt have to be bland and boring to be healthy. Just watch your calories and follow these steps:    1. Eat fewer unhealthy fats  · Choose more fish and lean meats instead of fatty cuts of meat.  · Skip butter and lard, and use less margarine.  · Pass on foods that have palm, coconut, or hydrogenated oils.  · Eat fewer high-fat dairy foods like cheese, ice cream, and whole milk.  · Get a heart-healthy cookbook and try some low-fat recipes.  2. Go light on salt  · Keep the saltshaker off the table.  · Limit high-salt ingredients, such as soy sauce, bouillon, and garlic salt.  · Instead of adding salt when cooking, season your food with herbs and flavorings. Try lemon, garlic, and onion.  · Limit convenience foods, such as boxed or canned foods and restaurant food.  · Read food labels and choose lower-sodium options.  3. Limit sugar  · Pause before you add sugars to pancakes, cereal, coffee, or tea. This includes white and brown table sugar, syrup, honey, and molasses. Cut your usual amount by half.  · Use non-sugar sweeteners. Stevia, aspartame, and sucralose can satisfy a sweet tooth without adding calories.  · Swap out sugar-filled soda and other drinks. Buy sugar-free or low-calorie beverages. Remember water is always the best choice.  · Read labels and choose foods with less added sugar. Keep in mind that dairy foods and foods with fruit will have some natural sugar.  · Cut the sugar in recipes by 1/3 to 1/2. Boost the flavor with extracts like almond, vanilla, or orange. Or add spices such as cinnamon or nutmeg.  4. Eat more fiber  · Eat fresh fruits and vegetables every day.  · Boost your diet with whole grains. Go for oats, whole-grain rice, and bran.  · Add beans and lentils to your meals.  · Drink more water to match your fiber  Asking for recent  office notes and BMP fax [phone] to get PA for veltassa increase. This is to help prevent constipation.  Date Last Reviewed: 5/11/2015  © 2944-6050 [employer], [employer] [address] [place] [address], [place] [address]. All rights reserved. This information is not intended as a substitute for professional medical care. Always follow your healthcare professional's instructions.

## 2021-11-06 DIAGNOSIS — F41.9 ANXIETY: ICD-10-CM

## 2021-11-06 DIAGNOSIS — F31.9 BIPOLAR DEPRESSION: ICD-10-CM

## 2021-11-08 ENCOUNTER — TELEPHONE (OUTPATIENT)
Dept: INTERNAL MEDICINE | Facility: CLINIC | Age: 56
End: 2021-11-08
Payer: COMMERCIAL

## 2021-11-08 DIAGNOSIS — F31.9 BIPOLAR DEPRESSION: ICD-10-CM

## 2021-11-08 DIAGNOSIS — F41.9 ANXIETY: ICD-10-CM

## 2021-11-08 RX ORDER — ALPRAZOLAM 1 MG/1
TABLET ORAL
Qty: 21 TABLET | Refills: 0 | Status: SHIPPED | OUTPATIENT
Start: 2021-11-08 | End: 2022-01-04 | Stop reason: SDUPTHER

## 2021-11-10 ENCOUNTER — TELEPHONE (OUTPATIENT)
Dept: INTERNAL MEDICINE | Facility: CLINIC | Age: 56
End: 2021-11-10
Payer: COMMERCIAL

## 2021-11-10 DIAGNOSIS — M79.671 PAIN OF RIGHT HEEL: Primary | ICD-10-CM

## 2021-11-10 DIAGNOSIS — E78.5 HYPERLIPIDEMIA LDL GOAL <70: ICD-10-CM

## 2021-11-11 RX ORDER — ROSUVASTATIN CALCIUM 5 MG/1
5 TABLET, COATED ORAL NIGHTLY
Qty: 90 TABLET | Refills: 1 | Status: SHIPPED | OUTPATIENT
Start: 2021-11-11 | End: 2022-01-04 | Stop reason: SDUPTHER

## 2021-11-11 RX ORDER — MELOXICAM 15 MG/1
15 TABLET ORAL DAILY
Qty: 30 TABLET | Refills: 5 | Status: SHIPPED | OUTPATIENT
Start: 2021-11-11 | End: 2022-04-14

## 2021-11-14 RX ORDER — ALPRAZOLAM 1 MG/1
TABLET ORAL
Qty: 90 TABLET | Refills: 2 | Status: SHIPPED | OUTPATIENT
Start: 2021-11-14 | End: 2022-01-04 | Stop reason: SDUPTHER

## 2021-12-09 DIAGNOSIS — Z12.11 COLON CANCER SCREENING: ICD-10-CM

## 2021-12-09 RX ORDER — ALBUTEROL SULFATE 90 UG/1
AEROSOL, METERED RESPIRATORY (INHALATION)
Qty: 6.7 G | Refills: 5 | Status: SHIPPED | OUTPATIENT
Start: 2021-12-09 | End: 2022-04-06 | Stop reason: SDUPTHER

## 2021-12-13 ENCOUNTER — TELEPHONE (OUTPATIENT)
Dept: INTERNAL MEDICINE | Facility: CLINIC | Age: 56
End: 2021-12-13
Payer: COMMERCIAL

## 2021-12-15 ENCOUNTER — TELEPHONE (OUTPATIENT)
Dept: INTERNAL MEDICINE | Facility: CLINIC | Age: 56
End: 2021-12-15
Payer: COMMERCIAL

## 2021-12-20 ENCOUNTER — LAB VISIT (OUTPATIENT)
Dept: LAB | Facility: HOSPITAL | Age: 56
End: 2021-12-20
Attending: NURSE PRACTITIONER
Payer: COMMERCIAL

## 2021-12-20 DIAGNOSIS — Z72.0 TOBACCO ABUSE: ICD-10-CM

## 2021-12-20 DIAGNOSIS — F31.9 BIPOLAR DEPRESSION: ICD-10-CM

## 2021-12-20 DIAGNOSIS — E78.5 HYPERLIPIDEMIA LDL GOAL <70: ICD-10-CM

## 2021-12-20 DIAGNOSIS — I10 BENIGN ESSENTIAL HTN: ICD-10-CM

## 2021-12-20 DIAGNOSIS — E66.3 OVERWEIGHT (BMI 25.0-29.9): ICD-10-CM

## 2021-12-20 DIAGNOSIS — G47.00 INSOMNIA, UNSPECIFIED TYPE: ICD-10-CM

## 2021-12-20 LAB
ALBUMIN SERPL BCP-MCNC: 4.1 G/DL (ref 3.5–5.2)
ALP SERPL-CCNC: 62 U/L (ref 55–135)
ALT SERPL W/O P-5'-P-CCNC: 18 U/L (ref 10–44)
ANION GAP SERPL CALC-SCNC: 8 MMOL/L (ref 8–16)
AST SERPL-CCNC: 19 U/L (ref 10–40)
BASOPHILS # BLD AUTO: 0.02 K/UL (ref 0–0.2)
BASOPHILS NFR BLD: 0.3 % (ref 0–1.9)
BILIRUB SERPL-MCNC: 0.4 MG/DL (ref 0.1–1)
BUN SERPL-MCNC: 17 MG/DL (ref 6–20)
CALCIUM SERPL-MCNC: 9.9 MG/DL (ref 8.7–10.5)
CHLORIDE SERPL-SCNC: 108 MMOL/L (ref 95–110)
CHOLEST SERPL-MCNC: 187 MG/DL (ref 120–199)
CHOLEST/HDLC SERPL: 3.8 {RATIO} (ref 2–5)
CO2 SERPL-SCNC: 27 MMOL/L (ref 23–29)
CREAT SERPL-MCNC: 0.8 MG/DL (ref 0.5–1.4)
DIFFERENTIAL METHOD: ABNORMAL
EOSINOPHIL # BLD AUTO: 0.1 K/UL (ref 0–0.5)
EOSINOPHIL NFR BLD: 2 % (ref 0–8)
ERYTHROCYTE [DISTWIDTH] IN BLOOD BY AUTOMATED COUNT: 11.4 % (ref 11.5–14.5)
EST. GFR  (AFRICAN AMERICAN): >60 ML/MIN/1.73 M^2
EST. GFR  (NON AFRICAN AMERICAN): >60 ML/MIN/1.73 M^2
GLUCOSE SERPL-MCNC: 117 MG/DL (ref 70–110)
HCT VFR BLD AUTO: 40.6 % (ref 37–48.5)
HDLC SERPL-MCNC: 49 MG/DL (ref 40–75)
HDLC SERPL: 26.2 % (ref 20–50)
HGB BLD-MCNC: 13.9 G/DL (ref 12–16)
IMM GRANULOCYTES # BLD AUTO: 0.01 K/UL (ref 0–0.04)
IMM GRANULOCYTES NFR BLD AUTO: 0.2 % (ref 0–0.5)
LDLC SERPL CALC-MCNC: 95.4 MG/DL (ref 63–159)
LYMPHOCYTES # BLD AUTO: 1.7 K/UL (ref 1–4.8)
LYMPHOCYTES NFR BLD: 29 % (ref 18–48)
MCH RBC QN AUTO: 32.7 PG (ref 27–31)
MCHC RBC AUTO-ENTMCNC: 34.2 G/DL (ref 32–36)
MCV RBC AUTO: 96 FL (ref 82–98)
MONOCYTES # BLD AUTO: 0.5 K/UL (ref 0.3–1)
MONOCYTES NFR BLD: 8.6 % (ref 4–15)
NEUTROPHILS # BLD AUTO: 3.6 K/UL (ref 1.8–7.7)
NEUTROPHILS NFR BLD: 59.9 % (ref 38–73)
NONHDLC SERPL-MCNC: 138 MG/DL
NRBC BLD-RTO: 0 /100 WBC
PLATELET # BLD AUTO: 145 K/UL (ref 150–450)
PMV BLD AUTO: 10 FL (ref 9.2–12.9)
POTASSIUM SERPL-SCNC: 5 MMOL/L (ref 3.5–5.1)
PROT SERPL-MCNC: 7.1 G/DL (ref 6–8.4)
RBC # BLD AUTO: 4.25 M/UL (ref 4–5.4)
SODIUM SERPL-SCNC: 143 MMOL/L (ref 136–145)
TRIGL SERPL-MCNC: 213 MG/DL (ref 30–150)
TSH SERPL DL<=0.005 MIU/L-ACNC: 1.94 UIU/ML (ref 0.4–4)
WBC # BLD AUTO: 5.96 K/UL (ref 3.9–12.7)

## 2021-12-20 PROCEDURE — 80053 COMPREHEN METABOLIC PANEL: CPT | Performed by: NURSE PRACTITIONER

## 2021-12-20 PROCEDURE — 36415 COLL VENOUS BLD VENIPUNCTURE: CPT | Performed by: NURSE PRACTITIONER

## 2021-12-20 PROCEDURE — 80061 LIPID PANEL: CPT | Performed by: NURSE PRACTITIONER

## 2021-12-20 PROCEDURE — 84443 ASSAY THYROID STIM HORMONE: CPT | Performed by: NURSE PRACTITIONER

## 2021-12-20 PROCEDURE — 85025 COMPLETE CBC W/AUTO DIFF WBC: CPT | Performed by: NURSE PRACTITIONER

## 2022-01-04 ENCOUNTER — OFFICE VISIT (OUTPATIENT)
Dept: INTERNAL MEDICINE | Facility: CLINIC | Age: 57
End: 2022-01-04
Payer: COMMERCIAL

## 2022-01-04 VITALS
WEIGHT: 157.88 LBS | HEART RATE: 80 BPM | DIASTOLIC BLOOD PRESSURE: 80 MMHG | BODY MASS INDEX: 29.81 KG/M2 | OXYGEN SATURATION: 98 % | SYSTOLIC BLOOD PRESSURE: 128 MMHG | HEIGHT: 61 IN | RESPIRATION RATE: 18 BRPM

## 2022-01-04 DIAGNOSIS — Z12.39 ENCOUNTER FOR SCREENING FOR MALIGNANT NEOPLASM OF BREAST, UNSPECIFIED SCREENING MODALITY: ICD-10-CM

## 2022-01-04 DIAGNOSIS — F41.1 GAD (GENERALIZED ANXIETY DISORDER): ICD-10-CM

## 2022-01-04 DIAGNOSIS — Z72.0 TOBACCO ABUSE: ICD-10-CM

## 2022-01-04 DIAGNOSIS — R73.03 PREDIABETES: ICD-10-CM

## 2022-01-04 DIAGNOSIS — E78.5 HYPERLIPIDEMIA LDL GOAL <70: ICD-10-CM

## 2022-01-04 DIAGNOSIS — G47.00 INSOMNIA, UNSPECIFIED TYPE: ICD-10-CM

## 2022-01-04 DIAGNOSIS — I10 PRIMARY HYPERTENSION: ICD-10-CM

## 2022-01-04 DIAGNOSIS — F31.9 BIPOLAR DEPRESSION: ICD-10-CM

## 2022-01-04 PROCEDURE — 3074F PR MOST RECENT SYSTOLIC BLOOD PRESSURE < 130 MM HG: ICD-10-PCS | Mod: CPTII,S$GLB,, | Performed by: NURSE PRACTITIONER

## 2022-01-04 PROCEDURE — 1159F PR MEDICATION LIST DOCUMENTED IN MEDICAL RECORD: ICD-10-PCS | Mod: CPTII,S$GLB,, | Performed by: NURSE PRACTITIONER

## 2022-01-04 PROCEDURE — 3008F BODY MASS INDEX DOCD: CPT | Mod: CPTII,S$GLB,, | Performed by: NURSE PRACTITIONER

## 2022-01-04 PROCEDURE — 99214 PR OFFICE/OUTPT VISIT, EST, LEVL IV, 30-39 MIN: ICD-10-PCS | Mod: S$GLB,,, | Performed by: NURSE PRACTITIONER

## 2022-01-04 PROCEDURE — 3008F PR BODY MASS INDEX (BMI) DOCUMENTED: ICD-10-PCS | Mod: CPTII,S$GLB,, | Performed by: NURSE PRACTITIONER

## 2022-01-04 PROCEDURE — 1159F MED LIST DOCD IN RCRD: CPT | Mod: CPTII,S$GLB,, | Performed by: NURSE PRACTITIONER

## 2022-01-04 PROCEDURE — 99999 PR PBB SHADOW E&M-EST. PATIENT-LVL IV: CPT | Mod: PBBFAC,,, | Performed by: NURSE PRACTITIONER

## 2022-01-04 PROCEDURE — 3079F DIAST BP 80-89 MM HG: CPT | Mod: CPTII,S$GLB,, | Performed by: NURSE PRACTITIONER

## 2022-01-04 PROCEDURE — 99999 PR PBB SHADOW E&M-EST. PATIENT-LVL IV: ICD-10-PCS | Mod: PBBFAC,,, | Performed by: NURSE PRACTITIONER

## 2022-01-04 PROCEDURE — 3074F SYST BP LT 130 MM HG: CPT | Mod: CPTII,S$GLB,, | Performed by: NURSE PRACTITIONER

## 2022-01-04 PROCEDURE — 3079F PR MOST RECENT DIASTOLIC BLOOD PRESSURE 80-89 MM HG: ICD-10-PCS | Mod: CPTII,S$GLB,, | Performed by: NURSE PRACTITIONER

## 2022-01-04 PROCEDURE — 99214 OFFICE O/P EST MOD 30 MIN: CPT | Mod: S$GLB,,, | Performed by: NURSE PRACTITIONER

## 2022-01-04 RX ORDER — ROSUVASTATIN CALCIUM 5 MG/1
5 TABLET, COATED ORAL NIGHTLY
Qty: 90 TABLET | Refills: 1 | Status: SHIPPED | OUTPATIENT
Start: 2022-01-04 | End: 2022-04-06 | Stop reason: SDUPTHER

## 2022-01-04 RX ORDER — METOPROLOL SUCCINATE 50 MG/1
50 TABLET, EXTENDED RELEASE ORAL DAILY
Qty: 90 TABLET | Refills: 1 | Status: SHIPPED | OUTPATIENT
Start: 2022-01-04 | End: 2022-07-25

## 2022-01-04 RX ORDER — ALPRAZOLAM 1 MG/1
TABLET ORAL
Qty: 90 TABLET | Refills: 2 | Status: SHIPPED | OUTPATIENT
Start: 2022-01-04 | End: 2022-04-29 | Stop reason: SDUPTHER

## 2022-01-04 RX ORDER — ZOLPIDEM TARTRATE 10 MG/1
10 TABLET ORAL NIGHTLY PRN
Qty: 30 TABLET | Refills: 5 | Status: SHIPPED | OUTPATIENT
Start: 2022-01-04 | End: 2022-01-21

## 2022-01-04 NOTE — PATIENT INSTRUCTIONS
Patient Education       Dietary Fats   About this topic   Fat is part of a healthy diet. How much fat you need is based on how many calories you need each day. The total amount of fat you eat should not be more than 35 percent of your calories for the day. All types of fat have 9 calories in each gram. Most foods have more than one kind of fat in them. Unsaturated fats are liquid at room temperature and may help your heart health.     What foods are good to eat?   Some kinds of fats like monounsaturated or polyunsaturated fats may help your blood cholesterol. You may see these kinds of fats listed on the food label.  Good sources of monounsaturated fat are:  · Olive, canola, peanut, and sesame oils  · Olives  · Peanut butter  · Nuts like almonds, peanuts, macadamia nuts, pecans, cashews, and hazelnuts  · Avocados  Good sources of polyunsaturated fat are:  · Fatty fish like salmon, tuna, and mackerel  · Soybean, corn, sunflower, and safflower oils  · Walnuts  · Seeds like sesame, pumpkin, ground flaxseed, and jessica  What foods should be limited or avoided?   Try to limit or avoid solid fats, like trans-fats and saturated fats. These kinds of fats raise your LDL (bad) cholesterol and raise your risk of heart disease. Read the label to see what kind of fats are in the food you want to eat.  Trans-fat is often found in:  · Store-baked pastries, cookies, doughnuts, muffins, cakes, and pie crusts  · Stick margarine  · Packaged snack foods like crackers or microwave popcorn  · Fried foods  · Frozen pizza  · Non-dairy creamers  · Candy  If you need to lower your cholesterol, limit saturated fat to 5 to 6 percent or less of your total calories each day. Saturated fat is found in:  · High fat meat  · Chicken with the skin  · Whole fat dairy products  · Butter  · Palm and coconut oil  · Lard  What problems could happen?   All fat has the same number of calories. When you eat more fat of any kind, you may take in too many  calories and gain weight. When you eat a large amount of saturated and trans-fat, you are at a higher risk for:  · Heart attack  · Stroke  · Other major health problems  Helpful tips   · Choose vegetable oils instead of solid fat when you bake or cook.  · Use dried beans or other legumes to replace some of the meat in casseroles and stews.  · Choose lean meat like fish or chicken without the skin.  · Limit your intake of fast food and fried foods.  · Use soft margarines over sticks of margarine.  · Eat two servings of fatty fish each week.  · Plan a snack of nuts or olives.  Where can I learn more?   American Heart Association  https://www.heart.org/en/health-topics/cholesterol/prevention-and-treatment-of-high-cholesterol-hyperlipidemia/the-skinny-on-fats   Last Reviewed Date   2021-05-18  Consumer Information Use and Disclaimer   This information is not specific medical advice and does not replace information you receive from your health care provider. This is only a brief summary of general information. It does NOT include all information about conditions, illnesses, injuries, tests, procedures, treatments, therapies, discharge instructions or life-style choices that may apply to you. You must talk with your health care provider for complete information about your health and treatment options. This information should not be used to decide whether or not to accept your health care providers advice, instructions or recommendations. Only your health care provider has the knowledge and training to provide advice that is right for you.  Copyright   Copyright © 2021 [employer] and its affiliates and/or licensors. All rights reserved.

## 2022-01-04 NOTE — PROGRESS NOTES
Subjective:       Patient ID: Lyndsey Gan is a 56 y.o. female.    Chief Complaint: Follow-up (X 6 mo)    Patient is known, to me and presents with   Chief Complaint   Patient presents with    Follow-up     X 6 mo   .  Denies chest pain and shortness of breath.  Patient presents with check up and labs. Wants to try ambien for sleep. She tried it years ago and it worked but insurance stopped covering it. She wants to stop seroquel. Otherwise doing well and no sihi noted  HPI  Review of Systems   Constitutional: Negative for activity change, appetite change, fatigue, fever and unexpected weight change.   HENT: Negative for congestion, ear discharge, ear pain, hearing loss, postnasal drip and tinnitus.    Eyes: Negative for photophobia, pain and visual disturbance.   Respiratory: Negative for cough, shortness of breath, wheezing and stridor.    Cardiovascular: Negative for chest pain, palpitations and leg swelling.   Gastrointestinal: Negative for abdominal distention.   Genitourinary: Negative for difficulty urinating, dysuria, frequency, hematuria and urgency.   Musculoskeletal: Negative for arthralgias, back pain, gait problem, joint swelling and neck pain.   Skin: Negative.    Neurological: Negative for dizziness, seizures, syncope, weakness, light-headedness, numbness and headaches.   Hematological: Negative for adenopathy. Does not bruise/bleed easily.   Psychiatric/Behavioral: Positive for sleep disturbance. Negative for behavioral problems, confusion, dysphoric mood, hallucinations and suicidal ideas. The patient is not nervous/anxious.        Objective:      Physical Exam  Constitutional:       General: She is not in acute distress.     Appearance: She is well-developed and well-nourished.   HENT:      Head: Normocephalic and atraumatic.      Right Ear: External ear normal.      Left Ear: External ear normal.      Mouth/Throat:      Mouth: Oropharynx is clear and moist.      Pharynx: No oropharyngeal  exudate.   Eyes:      General:         Right eye: No discharge.         Left eye: No discharge.      Extraocular Movements: EOM normal.      Conjunctiva/sclera: Conjunctivae normal.      Pupils: Pupils are equal, round, and reactive to light.   Neck:      Thyroid: No thyromegaly.      Vascular: No JVD.   Cardiovascular:      Rate and Rhythm: Normal rate and regular rhythm.      Pulses: Intact distal pulses.      Heart sounds: Normal heart sounds. No murmur heard.  No friction rub. No gallop.    Pulmonary:      Effort: Pulmonary effort is normal. No respiratory distress.      Breath sounds: Normal breath sounds. No stridor. No wheezing or rales.   Chest:      Chest wall: No tenderness.   Abdominal:      General: Bowel sounds are normal. There is no distension.      Palpations: Abdomen is soft. There is no mass.      Tenderness: There is no abdominal tenderness. There is no rebound.   Musculoskeletal:         General: No tenderness or edema. Normal range of motion.      Cervical back: Normal range of motion and neck supple.   Lymphadenopathy:      Cervical: No cervical adenopathy.   Skin:     General: Skin is warm and dry.      Capillary Refill: Capillary refill takes less than 2 seconds.      Coloration: Skin is not jaundiced or pale.      Findings: No bruising, erythema, lesion or rash.   Neurological:      General: No focal deficit present.      Mental Status: She is alert and oriented to person, place, and time.      Cranial Nerves: No cranial nerve deficit.      Sensory: No sensory deficit.      Motor: No weakness or abnormal muscle tone.      Coordination: Coordination normal.      Gait: Gait normal.      Deep Tendon Reflexes: Reflexes are normal and symmetric. Reflexes normal.   Psychiatric:         Mood and Affect: Mood and affect and mood normal.         Behavior: Behavior normal.         Thought Content: Thought content normal.         Judgment: Judgment normal.      Comments: Negative sihi noted          Assessment:       1. Primary hypertension    2. Prediabetes    3. Hyperlipidemia LDL goal <70    4. Bipolar depression    5. BILL (generalized anxiety disorder)    6. Insomnia, unspecified type    7. Tobacco abuse    8. Encounter for screening for malignant neoplasm of breast, unspecified screening modality        Plan:   Lyndsey was seen today for follow-up.    Diagnoses and all orders for this visit:    Primary hypertension  -     CBC Auto Differential; Future  -     Comprehensive Metabolic Panel; Future  -     Microalbumin/Creatinine Ratio, Urine; Future  -     metoprolol succinate (TOPROL-XL) 50 MG 24 hr tablet; Take 1 tablet (50 mg total) by mouth once daily.    Prediabetes  -     CBC Auto Differential; Future  -     Comprehensive Metabolic Panel; Future  -     Microalbumin/Creatinine Ratio, Urine; Future  -     Hemoglobin A1C; Future    Hyperlipidemia LDL goal <70  -     CBC Auto Differential; Future  -     Comprehensive Metabolic Panel; Future  -     TSH; Future  -     Lipid Panel; Future  -     rosuvastatin (CRESTOR) 5 MG tablet; Take 1 tablet (5 mg total) by mouth every evening.    Bipolar depression  -     CBC Auto Differential; Future  -     Comprehensive Metabolic Panel; Future  -     ALPRAZolam (XANAX) 1 MG tablet; TAKE 1 TABLET BY MOUTH THREE TIMES A DAY AS NEEDED    BILL (generalized anxiety disorder)  -     CBC Auto Differential; Future  -     Comprehensive Metabolic Panel; Future  -     ALPRAZolam (XANAX) 1 MG tablet; TAKE 1 TABLET BY MOUTH THREE TIMES A DAY AS NEEDED    Insomnia, unspecified type  -     CBC Auto Differential; Future  -     Comprehensive Metabolic Panel; Future  -     zolpidem (AMBIEN) 10 mg Tab; Take 1 tablet (10 mg total) by mouth nightly as needed.    Tobacco abuse  -     CBC Auto Differential; Future  -     Comprehensive Metabolic Panel; Future    Encounter for screening for malignant neoplasm of breast, unspecified screening modality  -     Mammo Digital Screening Bilat;  "Future    "This note will not be shared with the patient."  Lab drawn today CBC, CMP, TSH, FLP  Limit the cholesterol in your diet to less than 300 mg per day.  Fats should contribute no more than 20 to 35% of your daily calories.  Less than 7 to 10% of your calories should come from saturated fat.  Avoid saturated fat products e.g., butter, some oils, meat, and poultry fat contain a lot of saturated fat.  Check food labels for fat and cholesterol content. Choose the foods with less fat per serving.  Limit the amount of butter and margarine you eat.  Use salad dressings and margarine made with polyunsaturated and monunsaturated fats.  Use egg whites or egg substitutes rather than whole eggs.  Replace whole-milk dairy products with nonfat or low-fat mild, cheese, spreads, and yogurt.  Eat skinless chicken, turkey, fish, and meatless entrees more often than red meat.  Choose lean cuts of meat and trim off all visible fat. Keep portion sizes moderate.  Avoid fatty desserts such as ice cream, cream-filled cakes, and cheesecakes. Choose fresh fruits, nonfat frozen yogurt, Popsicles, etc.  Reduce the amount of fried foods, vending machine food, and fast food you eat.  Eat fruits and vegetables (especially fresh fruits and leafy vegetables), beans, and whole grains daily. The fiber in these foods helps lower cholesterol.  Look for low-fat or nonfat varieties of the foods you like to eat or look for substitutes.  You may need to exercise 60 minutes a day to prevent weight gain and 90 minutes a day to lose weight.  RTC in six months.  "

## 2022-01-21 ENCOUNTER — TELEPHONE (OUTPATIENT)
Dept: INTERNAL MEDICINE | Facility: CLINIC | Age: 57
End: 2022-01-21
Payer: COMMERCIAL

## 2022-01-21 RX ORDER — TRAZODONE HYDROCHLORIDE 100 MG/1
200 TABLET ORAL NIGHTLY
Qty: 60 TABLET | Refills: 5 | Status: SHIPPED | OUTPATIENT
Start: 2022-01-21 | End: 2022-04-30

## 2022-01-21 NOTE — TELEPHONE ENCOUNTER
Seroquel is not on her active med list. Spoke with patient and confirmed that she is no longer taking seroquel. Leon notified. Trazodone will be filled.

## 2022-01-21 NOTE — TELEPHONE ENCOUNTER
----- Message from Mariely Collins MA sent at 2022  9:38 AM CST -----  Lyndsey Gan  MRN: [id number]  : 1965  PCP: Dorothy Wallace  Home Phone      [phone]  Work Phone      Not on file.  Mobile          [phone]      MESSAGE:   Patient wants to get off the Ambien.  She says she has been having headaches since she started it and her insurance does not cover.     She would like to go back to Trazodone,  She was on the 150, would like to go up to 200.    Please Advise:  [phone]    Send to [facility] ([place])

## 2022-01-21 NOTE — TELEPHONE ENCOUNTER
----- Message from Doretha Butler sent at 2022  2:20 PM CST -----  Regarding: Reqquest to speak to a nurse  Contact: Leon OSBORNE with [facility]  Lyndsey Gan  MRN: [id number]  : 1965  PCP: Dorothy Wallace  Home Phone      [phone]  Work Phone      Not on file.  Mobile          [phone]      MESSAGE:   Request to speak to a nurse regarding a possible drug interaction of patient's medications.   traZODone (DESYREL) 100 MG tablet  QUEtiapine (SEROQUEL) 200 MG Tab    Phone # [phone]    Pharmacy - [facility] #[id number] - [address]

## 2022-01-30 DIAGNOSIS — G47.00 INSOMNIA, UNSPECIFIED TYPE: ICD-10-CM

## 2022-01-30 DIAGNOSIS — F31.9 BIPOLAR DEPRESSION: ICD-10-CM

## 2022-01-30 RX ORDER — QUETIAPINE FUMARATE 200 MG/1
TABLET, FILM COATED ORAL
Qty: 90 TABLET | Refills: 1 | Status: SHIPPED | OUTPATIENT
Start: 2022-01-30 | End: 2022-03-14

## 2022-02-02 DIAGNOSIS — F41.9 ANXIETY: ICD-10-CM

## 2022-02-03 RX ORDER — ARIPIPRAZOLE 5 MG/1
TABLET ORAL
Qty: 90 TABLET | Refills: 1 | Status: SHIPPED | OUTPATIENT
Start: 2022-02-03 | End: 2022-07-25

## 2022-02-08 ENCOUNTER — HOSPITAL ENCOUNTER (OUTPATIENT)
Dept: RADIOLOGY | Facility: HOSPITAL | Age: 57
Discharge: HOME | End: 2022-02-08
Attending: NURSE PRACTITIONER
Payer: COMMERCIAL

## 2022-02-08 VITALS — HEIGHT: 61 IN | BODY MASS INDEX: 29.64 KG/M2 | WEIGHT: 157 LBS

## 2022-02-08 DIAGNOSIS — Z12.39 ENCOUNTER FOR SCREENING FOR MALIGNANT NEOPLASM OF BREAST, UNSPECIFIED SCREENING MODALITY: ICD-10-CM

## 2022-02-08 PROCEDURE — 77063 MAMMO DIGITAL SCREENING BILAT WITH TOMO: ICD-10-PCS | Mod: 26,,, | Performed by: RADIOLOGY

## 2022-02-08 PROCEDURE — 77063 BREAST TOMOSYNTHESIS BI: CPT | Mod: TC

## 2022-02-08 PROCEDURE — 77067 SCR MAMMO BI INCL CAD: CPT | Mod: 26,,, | Performed by: RADIOLOGY

## 2022-02-08 PROCEDURE — 77067 MAMMO DIGITAL SCREENING BILAT WITH TOMO: ICD-10-PCS | Mod: 26,,, | Performed by: RADIOLOGY

## 2022-02-08 PROCEDURE — 77063 BREAST TOMOSYNTHESIS BI: CPT | Mod: 26,,, | Performed by: RADIOLOGY

## 2022-03-09 ENCOUNTER — PATIENT OUTREACH (OUTPATIENT)
Dept: ADMINISTRATIVE | Facility: OTHER | Age: 57
End: 2022-03-09
Payer: COMMERCIAL

## 2022-03-09 NOTE — PROGRESS NOTES
Health Maintenance Due   Topic Date Due    COVID-19 Vaccine (1) Never done    HIV Screening  Never done    Shingles Vaccine (1 of 2) Never done     Updates were requested from care everywhere.  Chart was reviewed for overdue Proactive [facility] Encounters (AQUILES) topics (CRS, Breast Cancer Screening, Eye exam)  Health Maintenance has been updated.  LINKS immunization registry triggered.  Immunizations were reconciled.

## 2022-03-14 ENCOUNTER — OFFICE VISIT (OUTPATIENT)
Dept: OBSTETRICS AND GYNECOLOGY | Facility: CLINIC | Age: 57
End: 2022-03-14
Payer: COMMERCIAL

## 2022-03-14 VITALS
BODY MASS INDEX: 28.72 KG/M2 | OXYGEN SATURATION: 100 % | SYSTOLIC BLOOD PRESSURE: 116 MMHG | RESPIRATION RATE: 12 BRPM | WEIGHT: 152.13 LBS | DIASTOLIC BLOOD PRESSURE: 72 MMHG | HEART RATE: 82 BPM | HEIGHT: 61 IN

## 2022-03-14 DIAGNOSIS — Z01.419 ENCNTR FOR GYN EXAM (GENERAL) (ROUTINE) W/O ABN FINDINGS: Primary | ICD-10-CM

## 2022-03-14 PROCEDURE — 3078F PR MOST RECENT DIASTOLIC BLOOD PRESSURE < 80 MM HG: ICD-10-PCS | Mod: CPTII,S$GLB,, | Performed by: STUDENT IN AN ORGANIZED HEALTH CARE EDUCATION/TRAINING PROGRAM

## 2022-03-14 PROCEDURE — 99396 PREV VISIT EST AGE 40-64: CPT | Mod: S$GLB,,, | Performed by: STUDENT IN AN ORGANIZED HEALTH CARE EDUCATION/TRAINING PROGRAM

## 2022-03-14 PROCEDURE — 3078F DIAST BP <80 MM HG: CPT | Mod: CPTII,S$GLB,, | Performed by: STUDENT IN AN ORGANIZED HEALTH CARE EDUCATION/TRAINING PROGRAM

## 2022-03-14 PROCEDURE — 1160F PR REVIEW ALL MEDS BY PRESCRIBER/CLIN PHARMACIST DOCUMENTED: ICD-10-PCS | Mod: CPTII,S$GLB,, | Performed by: STUDENT IN AN ORGANIZED HEALTH CARE EDUCATION/TRAINING PROGRAM

## 2022-03-14 PROCEDURE — 99999 PR PBB SHADOW E&M-EST. PATIENT-LVL III: CPT | Mod: PBBFAC,,, | Performed by: STUDENT IN AN ORGANIZED HEALTH CARE EDUCATION/TRAINING PROGRAM

## 2022-03-14 PROCEDURE — 99999 PR PBB SHADOW E&M-EST. PATIENT-LVL III: ICD-10-PCS | Mod: PBBFAC,,, | Performed by: STUDENT IN AN ORGANIZED HEALTH CARE EDUCATION/TRAINING PROGRAM

## 2022-03-14 PROCEDURE — 3074F SYST BP LT 130 MM HG: CPT | Mod: CPTII,S$GLB,, | Performed by: STUDENT IN AN ORGANIZED HEALTH CARE EDUCATION/TRAINING PROGRAM

## 2022-03-14 PROCEDURE — 1160F RVW MEDS BY RX/DR IN RCRD: CPT | Mod: CPTII,S$GLB,, | Performed by: STUDENT IN AN ORGANIZED HEALTH CARE EDUCATION/TRAINING PROGRAM

## 2022-03-14 PROCEDURE — 3008F BODY MASS INDEX DOCD: CPT | Mod: CPTII,S$GLB,, | Performed by: STUDENT IN AN ORGANIZED HEALTH CARE EDUCATION/TRAINING PROGRAM

## 2022-03-14 PROCEDURE — 1159F PR MEDICATION LIST DOCUMENTED IN MEDICAL RECORD: ICD-10-PCS | Mod: CPTII,S$GLB,, | Performed by: STUDENT IN AN ORGANIZED HEALTH CARE EDUCATION/TRAINING PROGRAM

## 2022-03-14 PROCEDURE — 1159F MED LIST DOCD IN RCRD: CPT | Mod: CPTII,S$GLB,, | Performed by: STUDENT IN AN ORGANIZED HEALTH CARE EDUCATION/TRAINING PROGRAM

## 2022-03-14 PROCEDURE — 3074F PR MOST RECENT SYSTOLIC BLOOD PRESSURE < 130 MM HG: ICD-10-PCS | Mod: CPTII,S$GLB,, | Performed by: STUDENT IN AN ORGANIZED HEALTH CARE EDUCATION/TRAINING PROGRAM

## 2022-03-14 PROCEDURE — 3008F PR BODY MASS INDEX (BMI) DOCUMENTED: ICD-10-PCS | Mod: CPTII,S$GLB,, | Performed by: STUDENT IN AN ORGANIZED HEALTH CARE EDUCATION/TRAINING PROGRAM

## 2022-03-14 PROCEDURE — 99396 PR PREVENTIVE VISIT,EST,40-64: ICD-10-PCS | Mod: S$GLB,,, | Performed by: STUDENT IN AN ORGANIZED HEALTH CARE EDUCATION/TRAINING PROGRAM

## 2022-03-14 NOTE — PROGRESS NOTES
Subjective:    Patient ID: Lyndsey Gan is a 56 y.o. y.o. female.     Chief Complaint: Annual Well Woman Exam     History of Present Illness:  Lyndsey presents today for Annual Well Woman exam. She describes her menses as absent since hysterectomy.She denies pelvic pain.  She denies breast tenderness, masses, nipple discharge. She denies difficulty with urination or bowel movements. She denies menopausal symptoms such as hotflashes, vaginal dryness, and night sweats. She denies bloating, early satiety, or weight changes. She is sexually active. \      Menstrual History:   No LMP recorded. Patient has had a hysterectomy..     OB History        1    Para   1    Term   1            AB        Living   1       SAB        IAB        Ectopic        Multiple        Live Births   1                 The following portions of the patient's history were reviewed and updated as appropriate: allergies, current medications, past family history, past medical history, past social history, past surgical history and problem list.    ROS:   CONSTITUTIONAL: Negative for fever, chills, diaphoresis, weakness, fatigue, weight loss, weight gain  ENT: negative for sore throat, nasal congestion, nasal discharge, epistaxis, tinnitus, hearing loss  EYES: negative for blurry vision, decreased vision, loss of vision, eye pain, diplopia, photophobia, discharge  SKIN: Negative for rash, itching, hives  RESPIRATORY: negative for cough, hemoptysis, shortness of breath, pleuritic chest pain, wheezing  CARDIOVASCULAR: negative for chest pain, dyspnea on exertion, orthopnea, paroxysmal nocturnal dyspnea, edema, palpitations  BREAST: negative for breast  tenderness, breast mass, nipple discharge, or skin changes  GASTROINTESTINAL: negative for abdominal pain, flank pain, nausea, vomiting, diarrhea, constipation, black stool, blood in stool  GENITOURINARY: negative for abnormal vaginal bleeding, amenorrhea, decreased libido, dysuria,  genital sores, hematuria, incontinence, menorrhagia, pelvic pain, urinary frequency, vaginal discharge  HEMATOLOGIC/LYMPHATIC: negative for swollen lymph nodes, bleeding, bruising  MUSCULOSKELETAL: negative for back pain, joint pain, joint stiffness, joint swelling, muscle pain, muscle weakness  NEUROLOGICAL: negative for dizzy/vertigo, headache, focal weakness, numbness/tingling, speech problems, loss of consciousness, confusion, memory loss  BEHAVORIAL/PSYCH: negative for anxiety, depression, psychosis  ENDOCRINE: negative for polydipsia/polyuria, palpitations, skin changes, temperature intolerance, unexpected weight changes  ALLERGIC/IMMUNOLOGIC: negative for urticaria, hay fever, angioedema      Objective:    Vital Signs:  Vitals:    03/14/22 1107   BP: 116/72   Pulse: 82   Resp: 12       Physical Exam:  General:  alert, cooperative, no distress   Skin:  Skin color, texture, turgor normal. No rashes or lesions   HEENT:  conjunctivae/corneas clear. PERRL.   Neck: supple, trachea midline, no adenopathy or thyromegally   Respiratory:  Normal effort   Breasts:  no discharge, erythema, tenderness, or palpable masses; no axillary lymphadenopathy   Abdomen:  soft, nontender, no palpable masses   Pelvis: External genitalia: normal general appearance  Urinary system: urethral meatus normal, bladder nontender  Vaginal: normal mucosa without prolapse or lesions  Cervix: normal appearance  Uterus: absent, removed surgically  Adnexa: normal size, nontender bilaterally   Extremities: Normal ROM; no edema, no cyanosis   Neurologial: Normal strength and tone. No focal numbness or weakness.   Psychiatric: normal mood, speech, dress, and thought processes       Assessment:       Healthy female exam.     1. Encntr for gyn exam (general) (routine) w/o abn findings          Plan:      Problem List Items Addressed This Visit    None     Visit Diagnoses     Encntr for gyn exam (general) (routine) w/o abn findings    -  Primary           COUNSELING:  Lyndsey was counseled on  A.C.O.G. Pap guidelines and recommendations for yearly pelvic exams in addition to recommendations for monthly self breast exams; to see her PCP for other health maintenance.

## 2022-04-06 DIAGNOSIS — E78.5 HYPERLIPIDEMIA LDL GOAL <70: ICD-10-CM

## 2022-04-07 RX ORDER — ROSUVASTATIN CALCIUM 5 MG/1
5 TABLET, COATED ORAL NIGHTLY
Qty: 90 TABLET | Refills: 1 | Status: SHIPPED | OUTPATIENT
Start: 2022-04-07 | End: 2022-09-01 | Stop reason: SDUPTHER

## 2022-04-07 RX ORDER — ALBUTEROL SULFATE 90 UG/1
AEROSOL, METERED RESPIRATORY (INHALATION)
Qty: 6.7 G | Refills: 5 | Status: SHIPPED | OUTPATIENT
Start: 2022-04-07 | End: 2022-09-01 | Stop reason: SDUPTHER

## 2022-04-13 DIAGNOSIS — M79.671 PAIN OF RIGHT HEEL: ICD-10-CM

## 2022-04-14 RX ORDER — MELOXICAM 15 MG/1
TABLET ORAL
Qty: 30 TABLET | Refills: 5 | Status: SHIPPED | OUTPATIENT
Start: 2022-04-14 | End: 2022-04-29

## 2022-04-29 ENCOUNTER — OFFICE VISIT (OUTPATIENT)
Dept: INTERNAL MEDICINE | Facility: CLINIC | Age: 57
End: 2022-04-29
Payer: COMMERCIAL

## 2022-04-29 VITALS
HEIGHT: 61 IN | BODY MASS INDEX: 27 KG/M2 | OXYGEN SATURATION: 99 % | RESPIRATION RATE: 18 BRPM | SYSTOLIC BLOOD PRESSURE: 126 MMHG | WEIGHT: 143 LBS | DIASTOLIC BLOOD PRESSURE: 78 MMHG | HEART RATE: 80 BPM

## 2022-04-29 DIAGNOSIS — E78.5 HYPERLIPIDEMIA LDL GOAL <70: ICD-10-CM

## 2022-04-29 DIAGNOSIS — F31.9 BIPOLAR DEPRESSION: ICD-10-CM

## 2022-04-29 DIAGNOSIS — F41.1 GAD (GENERALIZED ANXIETY DISORDER): ICD-10-CM

## 2022-04-29 DIAGNOSIS — E66.3 OVERWEIGHT (BMI 25.0-29.9): ICD-10-CM

## 2022-04-29 DIAGNOSIS — D69.6 THROMBOCYTOPENIA, UNSPECIFIED: ICD-10-CM

## 2022-04-29 DIAGNOSIS — I10 BENIGN ESSENTIAL HTN: Primary | ICD-10-CM

## 2022-04-29 DIAGNOSIS — G47.00 INSOMNIA, UNSPECIFIED TYPE: ICD-10-CM

## 2022-04-29 DIAGNOSIS — R73.03 PREDIABETES: ICD-10-CM

## 2022-04-29 DIAGNOSIS — F41.9 ANXIETY: ICD-10-CM

## 2022-04-29 PROCEDURE — 99999 PR PBB SHADOW E&M-EST. PATIENT-LVL IV: ICD-10-PCS | Mod: PBBFAC,,, | Performed by: NURSE PRACTITIONER

## 2022-04-29 PROCEDURE — 3074F SYST BP LT 130 MM HG: CPT | Mod: CPTII,S$GLB,, | Performed by: NURSE PRACTITIONER

## 2022-04-29 PROCEDURE — 99999 PR PBB SHADOW E&M-EST. PATIENT-LVL IV: CPT | Mod: PBBFAC,,, | Performed by: NURSE PRACTITIONER

## 2022-04-29 PROCEDURE — 1159F PR MEDICATION LIST DOCUMENTED IN MEDICAL RECORD: ICD-10-PCS | Mod: CPTII,S$GLB,, | Performed by: NURSE PRACTITIONER

## 2022-04-29 PROCEDURE — 99214 OFFICE O/P EST MOD 30 MIN: CPT | Mod: S$GLB,,, | Performed by: NURSE PRACTITIONER

## 2022-04-29 PROCEDURE — 3078F DIAST BP <80 MM HG: CPT | Mod: CPTII,S$GLB,, | Performed by: NURSE PRACTITIONER

## 2022-04-29 PROCEDURE — 3074F PR MOST RECENT SYSTOLIC BLOOD PRESSURE < 130 MM HG: ICD-10-PCS | Mod: CPTII,S$GLB,, | Performed by: NURSE PRACTITIONER

## 2022-04-29 PROCEDURE — 3078F PR MOST RECENT DIASTOLIC BLOOD PRESSURE < 80 MM HG: ICD-10-PCS | Mod: CPTII,S$GLB,, | Performed by: NURSE PRACTITIONER

## 2022-04-29 PROCEDURE — 99214 PR OFFICE/OUTPT VISIT, EST, LEVL IV, 30-39 MIN: ICD-10-PCS | Mod: S$GLB,,, | Performed by: NURSE PRACTITIONER

## 2022-04-29 PROCEDURE — 3008F BODY MASS INDEX DOCD: CPT | Mod: CPTII,S$GLB,, | Performed by: NURSE PRACTITIONER

## 2022-04-29 PROCEDURE — 1159F MED LIST DOCD IN RCRD: CPT | Mod: CPTII,S$GLB,, | Performed by: NURSE PRACTITIONER

## 2022-04-29 PROCEDURE — 3008F PR BODY MASS INDEX (BMI) DOCUMENTED: ICD-10-PCS | Mod: CPTII,S$GLB,, | Performed by: NURSE PRACTITIONER

## 2022-04-29 RX ORDER — ALPRAZOLAM 1 MG/1
TABLET ORAL
Qty: 90 TABLET | Refills: 5 | Status: SHIPPED | OUTPATIENT
Start: 2022-04-29 | End: 2022-10-13 | Stop reason: SDUPTHER

## 2022-04-29 RX ORDER — ALPRAZOLAM 1 MG/1
TABLET ORAL
Qty: 90 TABLET | OUTPATIENT
Start: 2022-04-29

## 2022-04-29 NOTE — PROGRESS NOTES
Subjective:       Patient ID: Lyndsey Gan is a 56 y.o. female.    Chief Complaint: Follow-up (Check up -discuss meds)    Patient is known, to me and presents with   Chief Complaint   Patient presents with    Follow-up     Check up -discuss meds   .  Denies chest pain and shortness of breath.  Patient here for check up and medication refills. She is doing ok but having a lot of stress due to family issues. No sihi noted. States that her meds do help her and does not want to change it.   HPI  Review of Systems   Constitutional: Negative for activity change, appetite change, fatigue, fever and unexpected weight change.   HENT: Negative for congestion, ear discharge, ear pain, hearing loss, postnasal drip and tinnitus.    Eyes: Negative for photophobia, pain and visual disturbance.   Respiratory: Negative for cough, shortness of breath, wheezing and stridor.    Cardiovascular: Negative for chest pain, palpitations and leg swelling.   Gastrointestinal: Negative for abdominal distention.   Genitourinary: Negative for difficulty urinating, dysuria, frequency, hematuria and urgency.   Musculoskeletal: Negative for arthralgias, back pain, gait problem, joint swelling and neck pain.   Skin: Negative.    Neurological: Negative for dizziness, seizures, syncope, weakness, light-headedness, numbness and headaches.   Hematological: Negative for adenopathy. Does not bruise/bleed easily.   Psychiatric/Behavioral: Negative for behavioral problems, confusion, dysphoric mood, hallucinations, sleep disturbance and suicidal ideas. The patient is nervous/anxious.        Objective:      Physical Exam  Constitutional:       General: She is not in acute distress.     Appearance: She is well-developed.   HENT:      Head: Normocephalic and atraumatic.      Right Ear: External ear normal.      Left Ear: External ear normal.      Mouth/Throat:      Pharynx: No oropharyngeal exudate.   Eyes:      General:         Right eye: No discharge.          Left eye: No discharge.      Conjunctiva/sclera: Conjunctivae normal.      Pupils: Pupils are equal, round, and reactive to light.   Neck:      Thyroid: No thyromegaly.      Vascular: No JVD.   Cardiovascular:      Rate and Rhythm: Normal rate and regular rhythm.      Heart sounds: Normal heart sounds. No murmur heard.    No friction rub. No gallop.   Pulmonary:      Effort: Pulmonary effort is normal. No respiratory distress.      Breath sounds: Normal breath sounds. No stridor. No wheezing or rales.   Chest:      Chest wall: No tenderness.   Abdominal:      General: Bowel sounds are normal. There is no distension.      Palpations: Abdomen is soft. There is no mass.      Tenderness: There is no abdominal tenderness. There is no rebound.   Musculoskeletal:         General: No tenderness. Normal range of motion.      Cervical back: Normal range of motion and neck supple.   Lymphadenopathy:      Cervical: No cervical adenopathy.   Skin:     General: Skin is warm and dry.      Capillary Refill: Capillary refill takes less than 2 seconds.      Coloration: Skin is not jaundiced or pale.      Findings: No bruising, erythema, lesion or rash.   Neurological:      General: No focal deficit present.      Mental Status: She is alert and oriented to person, place, and time.      Cranial Nerves: No cranial nerve deficit.      Sensory: No sensory deficit.      Motor: No weakness or abnormal muscle tone.      Coordination: Coordination normal.      Gait: Gait normal.      Deep Tendon Reflexes: Reflexes are normal and symmetric. Reflexes normal.   Psychiatric:         Mood and Affect: Mood normal.         Behavior: Behavior normal.         Thought Content: Thought content normal.         Judgment: Judgment normal.      Comments: Negative sihi noted         Assessment:       1. Benign essential HTN    2. Hyperlipidemia LDL goal <70    3. Insomnia, unspecified type    4. Anxiety    5. Prediabetes    6. Thrombocytopenia,  "unspecified    7. Overweight (BMI 25.0-29.9)    8. Bipolar depression    9. BILL (generalized anxiety disorder)        Plan:   Lyndsey was seen today for follow-up.    Diagnoses and all orders for this visit:    Benign essential HTN  -     CBC Auto Differential; Future  -     Comprehensive Metabolic Panel; Future  -     Microalbumin/Creatinine Ratio, Urine; Future    Hyperlipidemia LDL goal <70  -     CBC Auto Differential; Future  -     Comprehensive Metabolic Panel; Future  -     TSH; Future  -     Lipid Panel; Future    Insomnia, unspecified type  -     CBC Auto Differential; Future  -     Comprehensive Metabolic Panel; Future    Anxiety    Prediabetes  -     CBC Auto Differential; Future  -     Comprehensive Metabolic Panel; Future  -     Microalbumin/Creatinine Ratio, Urine; Future  -     Hemoglobin A1C; Future    Thrombocytopenia, unspecified  -     CBC Auto Differential; Future  -     Comprehensive Metabolic Panel; Future    Overweight (BMI 25.0-29.9)  -     CBC Auto Differential; Future  -     Comprehensive Metabolic Panel; Future    Bipolar depression  -     CBC Auto Differential; Future  -     Comprehensive Metabolic Panel; Future  -     ALPRAZolam (XANAX) 1 MG tablet; TAKE 1 TABLET BY MOUTH THREE TIMES A DAY AS NEEDED    BILL (generalized anxiety disorder)  -     CBC Auto Differential; Future  -     Comprehensive Metabolic Panel; Future  -     ALPRAZolam (XANAX) 1 MG tablet; TAKE 1 TABLET BY MOUTH THREE TIMES A DAY AS NEEDED    "This note will not be shared with the patient."  Lab drawn today CBC, CMP, TSH, FLP  Limit the cholesterol in your diet to less than 300 mg per day.  Fats should contribute no more than 20 to 35% of your daily calories.  Less than 7 to 10% of your calories should come from saturated fat.  Avoid saturated fat products e.g., butter, some oils, meat, and poultry fat contain a lot of saturated fat.  Check food labels for fat and cholesterol content. Choose the foods with less fat per " serving.  Limit the amount of butter and margarine you eat.  Use salad dressings and margarine made with polyunsaturated and monunsaturated fats.  Use egg whites or egg substitutes rather than whole eggs.  Replace whole-milk dairy products with nonfat or low-fat mild, cheese, spreads, and yogurt.  Eat skinless chicken, turkey, fish, and meatless entrees more often than red meat.  Choose lean cuts of meat and trim off all visible fat. Keep portion sizes moderate.  Avoid fatty desserts such as ice cream, cream-filled cakes, and cheesecakes. Choose fresh fruits, nonfat frozen yogurt, Popsicles, etc.  Reduce the amount of fried foods, vending machine food, and fast food you eat.  Eat fruits and vegetables (especially fresh fruits and leafy vegetables), beans, and whole grains daily. The fiber in these foods helps lower cholesterol.  Look for low-fat or nonfat varieties of the foods you like to eat or look for substitutes.  You may need to exercise 60 minutes a day to prevent weight gain and 90 minutes a day to lose weight.  RTC in six months.

## 2022-04-30 RX ORDER — TRAZODONE HYDROCHLORIDE 100 MG/1
200 TABLET ORAL NIGHTLY
Qty: 180 TABLET | Refills: 1 | Status: SHIPPED | OUTPATIENT
Start: 2022-04-30 | End: 2022-11-29

## 2022-05-09 ENCOUNTER — CLINICAL SUPPORT (OUTPATIENT)
Dept: INTERNAL MEDICINE | Facility: CLINIC | Age: 57
End: 2022-05-09
Payer: COMMERCIAL

## 2022-05-09 DIAGNOSIS — E78.5 HYPERLIPIDEMIA LDL GOAL <70: ICD-10-CM

## 2022-05-09 DIAGNOSIS — F31.9 BIPOLAR DEPRESSION: ICD-10-CM

## 2022-05-09 DIAGNOSIS — R73.03 PREDIABETES: ICD-10-CM

## 2022-05-09 DIAGNOSIS — E66.3 OVERWEIGHT (BMI 25.0-29.9): ICD-10-CM

## 2022-05-09 DIAGNOSIS — F41.1 GAD (GENERALIZED ANXIETY DISORDER): ICD-10-CM

## 2022-05-09 DIAGNOSIS — G47.00 INSOMNIA, UNSPECIFIED TYPE: ICD-10-CM

## 2022-05-09 DIAGNOSIS — D69.6 THROMBOCYTOPENIA, UNSPECIFIED: ICD-10-CM

## 2022-05-09 DIAGNOSIS — I10 BENIGN ESSENTIAL HTN: ICD-10-CM

## 2022-05-09 LAB
ALBUMIN SERPL BCP-MCNC: 4.2 G/DL (ref 3.5–5.2)
ALP SERPL-CCNC: 64 U/L (ref 55–135)
ALT SERPL W/O P-5'-P-CCNC: 18 U/L (ref 10–44)
ANION GAP SERPL CALC-SCNC: 8 MMOL/L (ref 8–16)
AST SERPL-CCNC: 26 U/L (ref 10–40)
BASOPHILS # BLD AUTO: 0.03 K/UL (ref 0–0.2)
BASOPHILS NFR BLD: 0.4 % (ref 0–1.9)
BILIRUB SERPL-MCNC: 0.6 MG/DL (ref 0.1–1)
BUN SERPL-MCNC: 13 MG/DL (ref 6–20)
CALCIUM SERPL-MCNC: 10.4 MG/DL (ref 8.7–10.5)
CHLORIDE SERPL-SCNC: 104 MMOL/L (ref 95–110)
CHOLEST SERPL-MCNC: 166 MG/DL (ref 120–199)
CHOLEST/HDLC SERPL: 3.5 {RATIO} (ref 2–5)
CO2 SERPL-SCNC: 27 MMOL/L (ref 23–29)
CREAT SERPL-MCNC: 0.8 MG/DL (ref 0.5–1.4)
DIFFERENTIAL METHOD: ABNORMAL
EOSINOPHIL # BLD AUTO: 0.1 K/UL (ref 0–0.5)
EOSINOPHIL NFR BLD: 1.7 % (ref 0–8)
ERYTHROCYTE [DISTWIDTH] IN BLOOD BY AUTOMATED COUNT: 11.3 % (ref 11.5–14.5)
EST. GFR  (AFRICAN AMERICAN): >60 ML/MIN/1.73 M^2
EST. GFR  (NON AFRICAN AMERICAN): >60 ML/MIN/1.73 M^2
ESTIMATED AVG GLUCOSE: 94 MG/DL (ref 68–131)
GLUCOSE SERPL-MCNC: 105 MG/DL (ref 70–110)
HBA1C MFR BLD: 4.9 % (ref 4–5.6)
HCT VFR BLD AUTO: 42.3 % (ref 37–48.5)
HDLC SERPL-MCNC: 47 MG/DL (ref 40–75)
HDLC SERPL: 28.3 % (ref 20–50)
HGB BLD-MCNC: 14.3 G/DL (ref 12–16)
IMM GRANULOCYTES # BLD AUTO: 0.02 K/UL (ref 0–0.04)
IMM GRANULOCYTES NFR BLD AUTO: 0.3 % (ref 0–0.5)
LDLC SERPL CALC-MCNC: 73.8 MG/DL (ref 63–159)
LYMPHOCYTES # BLD AUTO: 1.7 K/UL (ref 1–4.8)
LYMPHOCYTES NFR BLD: 23.6 % (ref 18–48)
MCH RBC QN AUTO: 31.5 PG (ref 27–31)
MCHC RBC AUTO-ENTMCNC: 33.8 G/DL (ref 32–36)
MCV RBC AUTO: 93 FL (ref 82–98)
MONOCYTES # BLD AUTO: 0.7 K/UL (ref 0.3–1)
MONOCYTES NFR BLD: 9.3 % (ref 4–15)
NEUTROPHILS # BLD AUTO: 4.5 K/UL (ref 1.8–7.7)
NEUTROPHILS NFR BLD: 64.7 % (ref 38–73)
NONHDLC SERPL-MCNC: 119 MG/DL
NRBC BLD-RTO: 0 /100 WBC
PLATELET # BLD AUTO: 172 K/UL (ref 150–450)
PMV BLD AUTO: 10.6 FL (ref 9.2–12.9)
POTASSIUM SERPL-SCNC: 4.2 MMOL/L (ref 3.5–5.1)
PROT SERPL-MCNC: 7.4 G/DL (ref 6–8.4)
RBC # BLD AUTO: 4.54 M/UL (ref 4–5.4)
SODIUM SERPL-SCNC: 139 MMOL/L (ref 136–145)
TRIGL SERPL-MCNC: 226 MG/DL (ref 30–150)
TSH SERPL DL<=0.005 MIU/L-ACNC: 0.94 UIU/ML (ref 0.4–4)
WBC # BLD AUTO: 6.99 K/UL (ref 3.9–12.7)

## 2022-05-09 PROCEDURE — 85025 COMPLETE CBC W/AUTO DIFF WBC: CPT | Performed by: NURSE PRACTITIONER

## 2022-05-09 PROCEDURE — 36415 COLL VENOUS BLD VENIPUNCTURE: CPT | Performed by: NURSE PRACTITIONER

## 2022-05-09 PROCEDURE — 83036 HEMOGLOBIN GLYCOSYLATED A1C: CPT | Performed by: NURSE PRACTITIONER

## 2022-05-09 PROCEDURE — 84443 ASSAY THYROID STIM HORMONE: CPT | Performed by: NURSE PRACTITIONER

## 2022-05-09 PROCEDURE — 80061 LIPID PANEL: CPT | Performed by: NURSE PRACTITIONER

## 2022-05-09 PROCEDURE — 80053 COMPREHEN METABOLIC PANEL: CPT | Performed by: NURSE PRACTITIONER

## 2022-07-24 DIAGNOSIS — F41.9 ANXIETY: ICD-10-CM

## 2022-07-24 DIAGNOSIS — I10 PRIMARY HYPERTENSION: ICD-10-CM

## 2022-07-25 RX ORDER — METOPROLOL SUCCINATE 50 MG/1
TABLET, EXTENDED RELEASE ORAL
Qty: 90 TABLET | Refills: 1 | Status: SHIPPED | OUTPATIENT
Start: 2022-07-25 | End: 2022-12-19

## 2022-07-25 RX ORDER — ARIPIPRAZOLE 5 MG/1
TABLET ORAL
Qty: 90 TABLET | Refills: 1 | Status: SHIPPED | OUTPATIENT
Start: 2022-07-25 | End: 2023-01-16

## 2022-10-13 ENCOUNTER — TELEPHONE (OUTPATIENT)
Dept: INTERNAL MEDICINE | Facility: CLINIC | Age: 57
End: 2022-10-13
Payer: MEDICAID

## 2022-10-13 DIAGNOSIS — F31.9 BIPOLAR DEPRESSION: ICD-10-CM

## 2022-10-13 DIAGNOSIS — F41.1 GAD (GENERALIZED ANXIETY DISORDER): ICD-10-CM

## 2022-10-13 RX ORDER — ALPRAZOLAM 1 MG/1
TABLET ORAL
Qty: 90 TABLET | Refills: 5 | Status: SHIPPED | OUTPATIENT
Start: 2022-10-13 | End: 2023-04-06 | Stop reason: SDUPTHER

## 2022-10-13 NOTE — TELEPHONE ENCOUNTER
----- Message from Mariely Collins MA sent at 10/13/2022  9:45 AM CDT -----  Lyndsey Gan  MRN: [id number]  : 1965  PCP: Dorothy Wallace  Home Phone      [phone]  Work Phone      Not on file.  Mobile          [phone]      MESSAGE:   Patient needs 1 month supply of Xanax.,  She will run out before her appt.     Send to [facility] ([place])

## 2022-10-25 ENCOUNTER — CLINICAL SUPPORT (OUTPATIENT)
Dept: INTERNAL MEDICINE | Facility: CLINIC | Age: 57
End: 2022-10-25
Payer: MEDICAID

## 2022-10-25 DIAGNOSIS — Z72.0 TOBACCO ABUSE: ICD-10-CM

## 2022-10-25 DIAGNOSIS — F41.1 GAD (GENERALIZED ANXIETY DISORDER): ICD-10-CM

## 2022-10-25 DIAGNOSIS — R73.03 PREDIABETES: ICD-10-CM

## 2022-10-25 DIAGNOSIS — I10 PRIMARY HYPERTENSION: ICD-10-CM

## 2022-10-25 DIAGNOSIS — G47.00 INSOMNIA, UNSPECIFIED TYPE: ICD-10-CM

## 2022-10-25 DIAGNOSIS — F31.9 BIPOLAR DEPRESSION: ICD-10-CM

## 2022-10-25 DIAGNOSIS — E78.5 HYPERLIPIDEMIA LDL GOAL <70: ICD-10-CM

## 2022-10-25 LAB
ALBUMIN SERPL BCP-MCNC: 4.2 G/DL (ref 3.5–5.2)
ALBUMIN/CREAT UR: 13.1 UG/MG (ref 0–30)
ALP SERPL-CCNC: 63 U/L (ref 55–135)
ALT SERPL W/O P-5'-P-CCNC: 19 U/L (ref 10–44)
ANION GAP SERPL CALC-SCNC: 8 MMOL/L (ref 8–16)
AST SERPL-CCNC: 23 U/L (ref 10–40)
BASOPHILS # BLD AUTO: 0.03 K/UL (ref 0–0.2)
BASOPHILS NFR BLD: 0.4 % (ref 0–1.9)
BILIRUB SERPL-MCNC: 0.7 MG/DL (ref 0.1–1)
BUN SERPL-MCNC: 13 MG/DL (ref 6–20)
CALCIUM SERPL-MCNC: 9.8 MG/DL (ref 8.7–10.5)
CHLORIDE SERPL-SCNC: 106 MMOL/L (ref 95–110)
CHOLEST SERPL-MCNC: 183 MG/DL (ref 120–199)
CHOLEST/HDLC SERPL: 2.8 {RATIO} (ref 2–5)
CO2 SERPL-SCNC: 28 MMOL/L (ref 23–29)
CREAT SERPL-MCNC: 0.8 MG/DL (ref 0.5–1.4)
CREAT UR-MCNC: 130 MG/DL (ref 15–325)
DIFFERENTIAL METHOD: ABNORMAL
EOSINOPHIL # BLD AUTO: 0.1 K/UL (ref 0–0.5)
EOSINOPHIL NFR BLD: 1.9 % (ref 0–8)
ERYTHROCYTE [DISTWIDTH] IN BLOOD BY AUTOMATED COUNT: 11.8 % (ref 11.5–14.5)
EST. GFR  (NO RACE VARIABLE): >60 ML/MIN/1.73 M^2
ESTIMATED AVG GLUCOSE: 91 MG/DL (ref 68–131)
GLUCOSE SERPL-MCNC: 102 MG/DL (ref 70–110)
HBA1C MFR BLD: 4.8 % (ref 4–5.6)
HCT VFR BLD AUTO: 43.7 % (ref 37–48.5)
HDLC SERPL-MCNC: 65 MG/DL (ref 40–75)
HDLC SERPL: 35.5 % (ref 20–50)
HGB BLD-MCNC: 14.6 G/DL (ref 12–16)
IMM GRANULOCYTES # BLD AUTO: 0.01 K/UL (ref 0–0.04)
IMM GRANULOCYTES NFR BLD AUTO: 0.1 % (ref 0–0.5)
LDLC SERPL CALC-MCNC: 91.6 MG/DL (ref 63–159)
LYMPHOCYTES # BLD AUTO: 1.4 K/UL (ref 1–4.8)
LYMPHOCYTES NFR BLD: 21.5 % (ref 18–48)
MCH RBC QN AUTO: 32.4 PG (ref 27–31)
MCHC RBC AUTO-ENTMCNC: 33.4 G/DL (ref 32–36)
MCV RBC AUTO: 97 FL (ref 82–98)
MICROALBUMIN UR DL<=1MG/L-MCNC: 17 UG/ML
MONOCYTES # BLD AUTO: 0.5 K/UL (ref 0.3–1)
MONOCYTES NFR BLD: 7.6 % (ref 4–15)
NEUTROPHILS # BLD AUTO: 4.6 K/UL (ref 1.8–7.7)
NEUTROPHILS NFR BLD: 68.5 % (ref 38–73)
NONHDLC SERPL-MCNC: 118 MG/DL
NRBC BLD-RTO: 0 /100 WBC
PLATELET # BLD AUTO: 175 K/UL (ref 150–450)
PMV BLD AUTO: 10.9 FL (ref 9.2–12.9)
POTASSIUM SERPL-SCNC: 3.9 MMOL/L (ref 3.5–5.1)
PROT SERPL-MCNC: 7.3 G/DL (ref 6–8.4)
RBC # BLD AUTO: 4.5 M/UL (ref 4–5.4)
SODIUM SERPL-SCNC: 142 MMOL/L (ref 136–145)
TRIGL SERPL-MCNC: 132 MG/DL (ref 30–150)
TSH SERPL DL<=0.005 MIU/L-ACNC: 2.06 UIU/ML (ref 0.4–4)
WBC # BLD AUTO: 6.71 K/UL (ref 3.9–12.7)

## 2022-10-25 PROCEDURE — 85025 COMPLETE CBC W/AUTO DIFF WBC: CPT | Performed by: NURSE PRACTITIONER

## 2022-10-25 PROCEDURE — 84443 ASSAY THYROID STIM HORMONE: CPT | Performed by: NURSE PRACTITIONER

## 2022-10-25 PROCEDURE — 82043 UR ALBUMIN QUANTITATIVE: CPT | Performed by: NURSE PRACTITIONER

## 2022-10-25 PROCEDURE — 80053 COMPREHEN METABOLIC PANEL: CPT | Performed by: NURSE PRACTITIONER

## 2022-10-25 PROCEDURE — 82570 ASSAY OF URINE CREATININE: CPT | Performed by: NURSE PRACTITIONER

## 2022-10-25 PROCEDURE — 80061 LIPID PANEL: CPT | Performed by: NURSE PRACTITIONER

## 2022-10-25 PROCEDURE — 83036 HEMOGLOBIN GLYCOSYLATED A1C: CPT | Performed by: NURSE PRACTITIONER

## 2022-10-28 ENCOUNTER — OFFICE VISIT (OUTPATIENT)
Dept: INTERNAL MEDICINE | Facility: CLINIC | Age: 57
End: 2022-10-28
Payer: MEDICAID

## 2022-10-28 VITALS
BODY MASS INDEX: 27.07 KG/M2 | WEIGHT: 143.38 LBS | DIASTOLIC BLOOD PRESSURE: 84 MMHG | RESPIRATION RATE: 12 BRPM | OXYGEN SATURATION: 95 % | HEART RATE: 62 BPM | SYSTOLIC BLOOD PRESSURE: 122 MMHG | HEIGHT: 61 IN

## 2022-10-28 DIAGNOSIS — E78.5 HYPERLIPIDEMIA LDL GOAL <70: ICD-10-CM

## 2022-10-28 DIAGNOSIS — E66.3 OVERWEIGHT (BMI 25.0-29.9): ICD-10-CM

## 2022-10-28 DIAGNOSIS — F51.01 PRIMARY INSOMNIA: ICD-10-CM

## 2022-10-28 DIAGNOSIS — F31.9 BIPOLAR DEPRESSION: ICD-10-CM

## 2022-10-28 DIAGNOSIS — I10 BENIGN ESSENTIAL HTN: Primary | ICD-10-CM

## 2022-10-28 DIAGNOSIS — R73.03 PREDIABETES: ICD-10-CM

## 2022-10-28 DIAGNOSIS — F41.1 GAD (GENERALIZED ANXIETY DISORDER): ICD-10-CM

## 2022-10-28 PROCEDURE — 3079F DIAST BP 80-89 MM HG: CPT | Mod: CPTII,,, | Performed by: NURSE PRACTITIONER

## 2022-10-28 PROCEDURE — 1159F PR MEDICATION LIST DOCUMENTED IN MEDICAL RECORD: ICD-10-PCS | Mod: CPTII,,, | Performed by: NURSE PRACTITIONER

## 2022-10-28 PROCEDURE — 3066F PR DOCUMENTATION OF TREATMENT FOR NEPHROPATHY: ICD-10-PCS | Mod: CPTII,,, | Performed by: NURSE PRACTITIONER

## 2022-10-28 PROCEDURE — 3061F NEG MICROALBUMINURIA REV: CPT | Mod: CPTII,,, | Performed by: NURSE PRACTITIONER

## 2022-10-28 PROCEDURE — 99214 OFFICE O/P EST MOD 30 MIN: CPT | Mod: S$PBB,,, | Performed by: NURSE PRACTITIONER

## 2022-10-28 PROCEDURE — 3044F PR MOST RECENT HEMOGLOBIN A1C LEVEL <7.0%: ICD-10-PCS | Mod: CPTII,,, | Performed by: NURSE PRACTITIONER

## 2022-10-28 PROCEDURE — 3074F PR MOST RECENT SYSTOLIC BLOOD PRESSURE < 130 MM HG: ICD-10-PCS | Mod: CPTII,,, | Performed by: NURSE PRACTITIONER

## 2022-10-28 PROCEDURE — 1159F MED LIST DOCD IN RCRD: CPT | Mod: CPTII,,, | Performed by: NURSE PRACTITIONER

## 2022-10-28 PROCEDURE — 99213 OFFICE O/P EST LOW 20 MIN: CPT | Mod: PBBFAC,PN | Performed by: NURSE PRACTITIONER

## 2022-10-28 PROCEDURE — 3061F PR NEG MICROALBUMINURIA RESULT DOCUMENTED/REVIEW: ICD-10-PCS | Mod: CPTII,,, | Performed by: NURSE PRACTITIONER

## 2022-10-28 PROCEDURE — 99214 PR OFFICE/OUTPT VISIT, EST, LEVL IV, 30-39 MIN: ICD-10-PCS | Mod: S$PBB,,, | Performed by: NURSE PRACTITIONER

## 2022-10-28 PROCEDURE — 3079F PR MOST RECENT DIASTOLIC BLOOD PRESSURE 80-89 MM HG: ICD-10-PCS | Mod: CPTII,,, | Performed by: NURSE PRACTITIONER

## 2022-10-28 PROCEDURE — 3074F SYST BP LT 130 MM HG: CPT | Mod: CPTII,,, | Performed by: NURSE PRACTITIONER

## 2022-10-28 PROCEDURE — 99999 PR PBB SHADOW E&M-EST. PATIENT-LVL III: CPT | Mod: PBBFAC,,, | Performed by: NURSE PRACTITIONER

## 2022-10-28 PROCEDURE — 3066F NEPHROPATHY DOC TX: CPT | Mod: CPTII,,, | Performed by: NURSE PRACTITIONER

## 2022-10-28 PROCEDURE — 3044F HG A1C LEVEL LT 7.0%: CPT | Mod: CPTII,,, | Performed by: NURSE PRACTITIONER

## 2022-10-28 PROCEDURE — 99999 PR PBB SHADOW E&M-EST. PATIENT-LVL III: ICD-10-PCS | Mod: PBBFAC,,, | Performed by: NURSE PRACTITIONER

## 2022-10-28 RX ORDER — PROMETHAZINE HYDROCHLORIDE 25 MG/1
25 TABLET ORAL EVERY 6 HOURS PRN
Qty: 20 TABLET | Refills: 0 | Status: SHIPPED | OUTPATIENT
Start: 2022-10-28

## 2022-10-28 NOTE — PROGRESS NOTES
Subjective:       Patient ID: Lyndsey Gan is a 57 y.o. female.    Chief Complaint: Follow-up    Patient is known, to me and presents with   Chief Complaint   Patient presents with    Follow-up   .  Denies chest pain and shortness of breath.  Patient presents with check up and labs. She is doing well and much better with her bipolar depression and anxiety. No sihi noted. She did lose some weight as well and is trying to lose more.    Follow-up  Pertinent negatives include no arthralgias, chest pain, congestion, coughing, fatigue, fever, headaches, joint swelling, neck pain, numbness or weakness.   Review of Systems   Constitutional:  Negative for activity change, appetite change, fatigue, fever and unexpected weight change.   HENT:  Negative for congestion, ear discharge, ear pain, hearing loss, postnasal drip and tinnitus.    Eyes:  Negative for photophobia, pain and visual disturbance.   Respiratory:  Negative for cough, shortness of breath, wheezing and stridor.    Cardiovascular:  Negative for chest pain, palpitations and leg swelling.   Gastrointestinal:  Negative for abdominal distention.   Genitourinary:  Negative for difficulty urinating, dysuria, frequency, hematuria and urgency.   Musculoskeletal:  Negative for arthralgias, back pain, gait problem, joint swelling and neck pain.   Skin: Negative.    Neurological:  Negative for dizziness, seizures, syncope, weakness, light-headedness, numbness and headaches.   Hematological:  Negative for adenopathy. Does not bruise/bleed easily.   Psychiatric/Behavioral:  Negative for behavioral problems, confusion, dysphoric mood, hallucinations, sleep disturbance and suicidal ideas. The patient is not nervous/anxious.      Objective:      Physical Exam  Constitutional:       General: She is not in acute distress.     Appearance: She is well-developed.   HENT:      Head: Normocephalic and atraumatic.      Right Ear: Tympanic membrane and external ear normal.      Left  Ear: Tympanic membrane and external ear normal.      Nose: Nose normal.      Mouth/Throat:      Mouth: Mucous membranes are moist.      Pharynx: No oropharyngeal exudate.   Eyes:      General: No scleral icterus.        Right eye: No discharge.         Left eye: No discharge.      Conjunctiva/sclera: Conjunctivae normal.      Pupils: Pupils are equal, round, and reactive to light.   Neck:      Thyroid: No thyromegaly.      Vascular: No JVD.   Cardiovascular:      Rate and Rhythm: Normal rate and regular rhythm.      Heart sounds: Normal heart sounds. No murmur heard.    No friction rub. No gallop.   Pulmonary:      Effort: Pulmonary effort is normal. No respiratory distress.      Breath sounds: Normal breath sounds. No stridor. No wheezing or rales.   Chest:      Chest wall: No tenderness.   Abdominal:      General: Bowel sounds are normal. There is no distension.      Palpations: Abdomen is soft. There is no mass.      Tenderness: There is no abdominal tenderness. There is no right CVA tenderness, left CVA tenderness, guarding or rebound.      Hernia: No hernia is present.   Musculoskeletal:         General: No swelling, tenderness, deformity or signs of injury. Normal range of motion.      Cervical back: Normal range of motion and neck supple.      Right lower leg: No edema.      Left lower leg: No edema.   Lymphadenopathy:      Cervical: No cervical adenopathy.   Skin:     General: Skin is warm and dry.      Capillary Refill: Capillary refill takes less than 2 seconds.      Coloration: Skin is not jaundiced or pale.      Findings: No bruising, erythema, lesion or rash.   Neurological:      General: No focal deficit present.      Mental Status: She is alert and oriented to person, place, and time.      Cranial Nerves: No cranial nerve deficit.      Sensory: No sensory deficit.      Motor: No weakness or abnormal muscle tone.      Coordination: Coordination normal.      Gait: Gait normal.      Deep Tendon Reflexes:  "Reflexes are normal and symmetric. Reflexes normal.   Psychiatric:         Mood and Affect: Mood normal.         Behavior: Behavior normal.         Thought Content: Thought content normal.         Judgment: Judgment normal.      Comments: Negative sihi noted       Assessment:       1. Benign essential HTN    2. Hyperlipidemia LDL goal <70    3. Prediabetes    4. Primary insomnia    5. Overweight (BMI 25.0-29.9)          Plan:   Lyndsey was seen today for follow-up.    Diagnoses and all orders for this visit:    Benign essential HTN  -     CBC Auto Differential; Future  -     Comprehensive Metabolic Panel; Future  -     Microalbumin/Creatinine Ratio, Urine; Future    Hyperlipidemia LDL goal <70  -     CBC Auto Differential; Future  -     Comprehensive Metabolic Panel; Future  -     TSH; Future  -     Lipid Panel; Future    Prediabetes  -     CBC Auto Differential; Future  -     Comprehensive Metabolic Panel; Future  -     Microalbumin/Creatinine Ratio, Urine; Future  -     Hemoglobin A1C; Future    Primary insomnia  -     CBC Auto Differential; Future  -     Comprehensive Metabolic Panel; Future    Overweight (BMI 25.0-29.9)  -     CBC Auto Differential; Future  -     Comprehensive Metabolic Panel; Future    Bipolar depression    BILL (generalized anxiety disorder)    Other orders  -     promethazine (PHENERGAN) 25 MG tablet; Take 1 tablet (25 mg total) by mouth every 6 (six) hours as needed for Nausea.  "This note will not be shared with the patient."  Lab drawn today CBC, CMP, TSH, FLP  Limit the cholesterol in your diet to less than 300 mg per day.  Fats should contribute no more than 20 to 35% of your daily calories.  Less than 7 to 10% of your calories should come from saturated fat.  Avoid saturated fat products e.g., butter, some oils, meat, and poultry fat contain a lot of saturated fat.  Check food labels for fat and cholesterol content. Choose the foods with less fat per serving.  Limit the amount of butter " and margarine you eat.  Use salad dressings and margarine made with polyunsaturated and monunsaturated fats.  Use egg whites or egg substitutes rather than whole eggs.  Replace whole-milk dairy products with nonfat or low-fat mild, cheese, spreads, and yogurt.  Eat skinless chicken, turkey, fish, and meatless entrees more often than red meat.  Choose lean cuts of meat and trim off all visible fat. Keep portion sizes moderate.  Avoid fatty desserts such as ice cream, cream-filled cakes, and cheesecakes. Choose fresh fruits, nonfat frozen yogurt, Popsicles, etc.  Reduce the amount of fried foods, vending machine food, and fast food you eat.  Eat fruits and vegetables (especially fresh fruits and leafy vegetables), beans, and whole grains daily. The fiber in these foods helps lower cholesterol.  Look for low-fat or nonfat varieties of the foods you like to eat or look for substitutes.  You may need to exercise 60 minutes a day to prevent weight gain and 90 minutes a day to lose weight.  RTC in six months.

## 2023-01-13 DIAGNOSIS — F41.9 ANXIETY: ICD-10-CM

## 2023-01-16 RX ORDER — ARIPIPRAZOLE 5 MG/1
TABLET ORAL
Qty: 30 TABLET | Refills: 5 | Status: SHIPPED | OUTPATIENT
Start: 2023-01-16 | End: 2023-05-05 | Stop reason: SDUPTHER

## 2023-02-09 ENCOUNTER — TELEPHONE (OUTPATIENT)
Dept: INTERNAL MEDICINE | Facility: CLINIC | Age: 58
End: 2023-02-09
Payer: MEDICAID

## 2023-02-09 DIAGNOSIS — J01.90 ACUTE BACTERIAL SINUSITIS: Primary | ICD-10-CM

## 2023-02-09 DIAGNOSIS — B96.89 ACUTE BACTERIAL SINUSITIS: Primary | ICD-10-CM

## 2023-02-09 RX ORDER — AZITHROMYCIN 250 MG/1
TABLET, FILM COATED ORAL
Qty: 6 TABLET | Refills: 0 | Status: SHIPPED | OUTPATIENT
Start: 2023-02-09 | End: 2023-02-14

## 2023-02-09 NOTE — TELEPHONE ENCOUNTER
----- Message from Mariely Collins MA sent at 2023  1:12 PM CST -----  Lyndsey Gan  MRN: [id number]  : 1965  PCP: Dorothy Wallace  Home Phone      [phone]  Work Phone      Not on file.  Mobile          [phone]      MESSAGE:     Patient is c/o scratchy throat, post nasal drip and slight cough.  Patient wants antibiotics.   No available appt to come in to get seen   Please Advise:  [phone]    Send meds to [facility] ([place])

## 2023-02-15 DIAGNOSIS — Z12.31 OTHER SCREENING MAMMOGRAM: ICD-10-CM

## 2023-02-27 ENCOUNTER — TELEPHONE (OUTPATIENT)
Dept: OBSTETRICS AND GYNECOLOGY | Facility: CLINIC | Age: 58
End: 2023-02-27
Payer: MEDICAID

## 2023-02-27 DIAGNOSIS — Z12.31 BREAST CANCER SCREENING BY MAMMOGRAM: Primary | ICD-10-CM

## 2023-02-27 NOTE — TELEPHONE ENCOUNTER
----- Message from Mary Cote sent at 2/27/2023  1:39 PM CST -----  Contact: padma Gan  MRN: [id number]  Home Phone      [phone]  Work Phone      Not on file.  Mobile          [phone]    Patient Care Team:  Dorothy Wallace, NP as PCP - General (Family Medicine)  Ya Escamilla LPN as Care Coordinator  OB? No  What phone number can you be reached at? [phone]  Message: mammo orders are needed 5/10

## 2023-04-04 ENCOUNTER — TELEPHONE (OUTPATIENT)
Dept: INTERNAL MEDICINE | Facility: CLINIC | Age: 58
End: 2023-04-04
Payer: MEDICAID

## 2023-04-04 DIAGNOSIS — F41.1 GAD (GENERALIZED ANXIETY DISORDER): ICD-10-CM

## 2023-04-04 DIAGNOSIS — F31.9 BIPOLAR DEPRESSION: ICD-10-CM

## 2023-04-04 NOTE — TELEPHONE ENCOUNTER
----- Message from Mariely Collins MA sent at 2023  3:19 PM CDT -----  Lyndsey Gan  MRN: [id number]  : 1965  PCP: Dorothy Wallace  Home Phone      [phone]  Work Phone      Not on file.  Mobile          [phone]      MESSAGE:     Patient will run out of Xanax before her next appt.     Send [facility] script to [facility] ([place])

## 2023-04-06 RX ORDER — ALPRAZOLAM 1 MG/1
TABLET ORAL
Qty: 90 TABLET | Refills: 5 | Status: SHIPPED | OUTPATIENT
Start: 2023-04-06 | End: 2023-09-27 | Stop reason: SDUPTHER

## 2023-04-21 ENCOUNTER — CLINICAL SUPPORT (OUTPATIENT)
Dept: INTERNAL MEDICINE | Facility: CLINIC | Age: 58
End: 2023-04-21
Payer: MEDICAID

## 2023-04-21 DIAGNOSIS — E78.5 HYPERLIPIDEMIA LDL GOAL <70: ICD-10-CM

## 2023-04-21 DIAGNOSIS — F51.01 PRIMARY INSOMNIA: ICD-10-CM

## 2023-04-21 DIAGNOSIS — I10 BENIGN ESSENTIAL HTN: ICD-10-CM

## 2023-04-21 DIAGNOSIS — R73.03 PREDIABETES: ICD-10-CM

## 2023-04-21 DIAGNOSIS — E66.3 OVERWEIGHT (BMI 25.0-29.9): ICD-10-CM

## 2023-04-21 LAB
ALBUMIN SERPL BCP-MCNC: 4.1 G/DL (ref 3.5–5.2)
ALBUMIN/CREAT UR: 3.6 UG/MG (ref 0–30)
ALP SERPL-CCNC: 54 U/L (ref 55–135)
ALT SERPL W/O P-5'-P-CCNC: 19 U/L (ref 10–44)
ANION GAP SERPL CALC-SCNC: 11 MMOL/L (ref 8–16)
AST SERPL-CCNC: 29 U/L (ref 10–40)
BASOPHILS # BLD AUTO: 0.03 K/UL (ref 0–0.2)
BASOPHILS NFR BLD: 0.4 % (ref 0–1.9)
BILIRUB SERPL-MCNC: 0.7 MG/DL (ref 0.1–1)
BUN SERPL-MCNC: 10 MG/DL (ref 6–20)
CALCIUM SERPL-MCNC: 9.7 MG/DL (ref 8.7–10.5)
CHLORIDE SERPL-SCNC: 105 MMOL/L (ref 95–110)
CHOLEST SERPL-MCNC: 150 MG/DL (ref 120–199)
CHOLEST/HDLC SERPL: 2.9 {RATIO} (ref 2–5)
CO2 SERPL-SCNC: 26 MMOL/L (ref 23–29)
CREAT SERPL-MCNC: 0.8 MG/DL (ref 0.5–1.4)
CREAT UR-MCNC: 140 MG/DL (ref 15–325)
DIFFERENTIAL METHOD: ABNORMAL
EOSINOPHIL # BLD AUTO: 0.3 K/UL (ref 0–0.5)
EOSINOPHIL NFR BLD: 3.4 % (ref 0–8)
ERYTHROCYTE [DISTWIDTH] IN BLOOD BY AUTOMATED COUNT: 11.4 % (ref 11.5–14.5)
EST. GFR  (NO RACE VARIABLE): >60 ML/MIN/1.73 M^2
ESTIMATED AVG GLUCOSE: 85 MG/DL (ref 68–131)
GLUCOSE SERPL-MCNC: 94 MG/DL (ref 70–110)
HBA1C MFR BLD: 4.6 % (ref 4–5.6)
HCT VFR BLD AUTO: 43.2 % (ref 37–48.5)
HDLC SERPL-MCNC: 52 MG/DL (ref 40–75)
HDLC SERPL: 34.7 % (ref 20–50)
HGB BLD-MCNC: 14.6 G/DL (ref 12–16)
IMM GRANULOCYTES # BLD AUTO: 0.02 K/UL (ref 0–0.04)
IMM GRANULOCYTES NFR BLD AUTO: 0.3 % (ref 0–0.5)
LDLC SERPL CALC-MCNC: 66.8 MG/DL (ref 63–159)
LYMPHOCYTES # BLD AUTO: 2.1 K/UL (ref 1–4.8)
LYMPHOCYTES NFR BLD: 28.6 % (ref 18–48)
MCH RBC QN AUTO: 32.6 PG (ref 27–31)
MCHC RBC AUTO-ENTMCNC: 33.8 G/DL (ref 32–36)
MCV RBC AUTO: 96 FL (ref 82–98)
MICROALBUMIN UR DL<=1MG/L-MCNC: 5 UG/ML
MONOCYTES # BLD AUTO: 0.7 K/UL (ref 0.3–1)
MONOCYTES NFR BLD: 9.7 % (ref 4–15)
NEUTROPHILS # BLD AUTO: 4.3 K/UL (ref 1.8–7.7)
NEUTROPHILS NFR BLD: 57.6 % (ref 38–73)
NONHDLC SERPL-MCNC: 98 MG/DL
NRBC BLD-RTO: 0 /100 WBC
PLATELET # BLD AUTO: 147 K/UL (ref 150–450)
PMV BLD AUTO: 11.3 FL (ref 9.2–12.9)
POTASSIUM SERPL-SCNC: 4.2 MMOL/L (ref 3.5–5.1)
PROT SERPL-MCNC: 6.6 G/DL (ref 6–8.4)
RBC # BLD AUTO: 4.48 M/UL (ref 4–5.4)
SODIUM SERPL-SCNC: 142 MMOL/L (ref 136–145)
TRIGL SERPL-MCNC: 156 MG/DL (ref 30–150)
TSH SERPL DL<=0.005 MIU/L-ACNC: 2.79 UIU/ML (ref 0.4–4)
WBC # BLD AUTO: 7.42 K/UL (ref 3.9–12.7)

## 2023-04-21 PROCEDURE — 80061 LIPID PANEL: CPT | Performed by: NURSE PRACTITIONER

## 2023-04-21 PROCEDURE — 36415 COLL VENOUS BLD VENIPUNCTURE: CPT | Performed by: NURSE PRACTITIONER

## 2023-04-21 PROCEDURE — 83036 HEMOGLOBIN GLYCOSYLATED A1C: CPT | Performed by: NURSE PRACTITIONER

## 2023-04-21 PROCEDURE — 80053 COMPREHEN METABOLIC PANEL: CPT | Performed by: NURSE PRACTITIONER

## 2023-04-21 PROCEDURE — 84443 ASSAY THYROID STIM HORMONE: CPT | Performed by: NURSE PRACTITIONER

## 2023-04-21 PROCEDURE — 82570 ASSAY OF URINE CREATININE: CPT | Performed by: NURSE PRACTITIONER

## 2023-04-21 PROCEDURE — 85025 COMPLETE CBC W/AUTO DIFF WBC: CPT | Performed by: NURSE PRACTITIONER

## 2023-05-05 ENCOUNTER — OFFICE VISIT (OUTPATIENT)
Dept: INTERNAL MEDICINE | Facility: CLINIC | Age: 58
End: 2023-05-05
Payer: MEDICAID

## 2023-05-05 ENCOUNTER — TELEPHONE (OUTPATIENT)
Dept: INTERNAL MEDICINE | Facility: CLINIC | Age: 58
End: 2023-05-05

## 2023-05-05 VITALS
DIASTOLIC BLOOD PRESSURE: 86 MMHG | RESPIRATION RATE: 20 BRPM | BODY MASS INDEX: 25.45 KG/M2 | HEIGHT: 61 IN | HEART RATE: 60 BPM | OXYGEN SATURATION: 98 % | WEIGHT: 134.81 LBS | SYSTOLIC BLOOD PRESSURE: 134 MMHG

## 2023-05-05 DIAGNOSIS — E78.5 HYPERLIPIDEMIA LDL GOAL <70: ICD-10-CM

## 2023-05-05 DIAGNOSIS — R06.02 SOB (SHORTNESS OF BREATH): ICD-10-CM

## 2023-05-05 DIAGNOSIS — I10 BENIGN ESSENTIAL HTN: Primary | ICD-10-CM

## 2023-05-05 DIAGNOSIS — F51.01 PRIMARY INSOMNIA: ICD-10-CM

## 2023-05-05 DIAGNOSIS — R73.03 PREDIABETES: ICD-10-CM

## 2023-05-05 DIAGNOSIS — F41.1 GAD (GENERALIZED ANXIETY DISORDER): ICD-10-CM

## 2023-05-05 DIAGNOSIS — F31.9 BIPOLAR DEPRESSION: ICD-10-CM

## 2023-05-05 PROCEDURE — 3044F HG A1C LEVEL LT 7.0%: CPT | Mod: CPTII,,, | Performed by: NURSE PRACTITIONER

## 2023-05-05 PROCEDURE — 3079F DIAST BP 80-89 MM HG: CPT | Mod: CPTII,,, | Performed by: NURSE PRACTITIONER

## 2023-05-05 PROCEDURE — 3075F PR MOST RECENT SYSTOLIC BLOOD PRESS GE 130-139MM HG: ICD-10-PCS | Mod: CPTII,,, | Performed by: NURSE PRACTITIONER

## 2023-05-05 PROCEDURE — 1159F MED LIST DOCD IN RCRD: CPT | Mod: CPTII,,, | Performed by: NURSE PRACTITIONER

## 2023-05-05 PROCEDURE — 3066F NEPHROPATHY DOC TX: CPT | Mod: CPTII,,, | Performed by: NURSE PRACTITIONER

## 2023-05-05 PROCEDURE — 3008F BODY MASS INDEX DOCD: CPT | Mod: CPTII,,, | Performed by: NURSE PRACTITIONER

## 2023-05-05 PROCEDURE — 99214 OFFICE O/P EST MOD 30 MIN: CPT | Mod: S$PBB,,, | Performed by: NURSE PRACTITIONER

## 2023-05-05 PROCEDURE — 99999 PR PBB SHADOW E&M-EST. PATIENT-LVL III: ICD-10-PCS | Mod: PBBFAC,,, | Performed by: NURSE PRACTITIONER

## 2023-05-05 PROCEDURE — 3066F PR DOCUMENTATION OF TREATMENT FOR NEPHROPATHY: ICD-10-PCS | Mod: CPTII,,, | Performed by: NURSE PRACTITIONER

## 2023-05-05 PROCEDURE — 3079F PR MOST RECENT DIASTOLIC BLOOD PRESSURE 80-89 MM HG: ICD-10-PCS | Mod: CPTII,,, | Performed by: NURSE PRACTITIONER

## 2023-05-05 PROCEDURE — 1159F PR MEDICATION LIST DOCUMENTED IN MEDICAL RECORD: ICD-10-PCS | Mod: CPTII,,, | Performed by: NURSE PRACTITIONER

## 2023-05-05 PROCEDURE — 99214 PR OFFICE/OUTPT VISIT, EST, LEVL IV, 30-39 MIN: ICD-10-PCS | Mod: S$PBB,,, | Performed by: NURSE PRACTITIONER

## 2023-05-05 PROCEDURE — 99999 PR PBB SHADOW E&M-EST. PATIENT-LVL III: CPT | Mod: PBBFAC,,, | Performed by: NURSE PRACTITIONER

## 2023-05-05 PROCEDURE — 3008F PR BODY MASS INDEX (BMI) DOCUMENTED: ICD-10-PCS | Mod: CPTII,,, | Performed by: NURSE PRACTITIONER

## 2023-05-05 PROCEDURE — 3044F PR MOST RECENT HEMOGLOBIN A1C LEVEL <7.0%: ICD-10-PCS | Mod: CPTII,,, | Performed by: NURSE PRACTITIONER

## 2023-05-05 PROCEDURE — 3061F NEG MICROALBUMINURIA REV: CPT | Mod: CPTII,,, | Performed by: NURSE PRACTITIONER

## 2023-05-05 PROCEDURE — 3075F SYST BP GE 130 - 139MM HG: CPT | Mod: CPTII,,, | Performed by: NURSE PRACTITIONER

## 2023-05-05 PROCEDURE — 99213 OFFICE O/P EST LOW 20 MIN: CPT | Mod: PBBFAC,PN | Performed by: NURSE PRACTITIONER

## 2023-05-05 PROCEDURE — 3061F PR NEG MICROALBUMINURIA RESULT DOCUMENTED/REVIEW: ICD-10-PCS | Mod: CPTII,,, | Performed by: NURSE PRACTITIONER

## 2023-05-05 RX ORDER — ALBUTEROL SULFATE 90 UG/1
AEROSOL, METERED RESPIRATORY (INHALATION)
Qty: 18 G | Refills: 5 | Status: SHIPPED | OUTPATIENT
Start: 2023-05-05 | End: 2023-05-05 | Stop reason: SDUPTHER

## 2023-05-05 RX ORDER — TRAZODONE HYDROCHLORIDE 100 MG/1
200 TABLET ORAL NIGHTLY
Qty: 180 TABLET | Refills: 1 | Status: SHIPPED | OUTPATIENT
Start: 2023-05-05 | End: 2023-12-28

## 2023-05-05 RX ORDER — ALBUTEROL SULFATE 90 UG/1
2 AEROSOL, METERED RESPIRATORY (INHALATION) EVERY 6 HOURS PRN
Qty: 18 G | Refills: 5 | Status: SHIPPED | OUTPATIENT
Start: 2023-05-05

## 2023-05-05 RX ORDER — ARIPIPRAZOLE 5 MG/1
5 TABLET ORAL DAILY
Qty: 30 TABLET | Refills: 5 | Status: SHIPPED | OUTPATIENT
Start: 2023-05-05 | End: 2023-11-14

## 2023-05-05 RX ORDER — METOPROLOL SUCCINATE 50 MG/1
50 TABLET, EXTENDED RELEASE ORAL DAILY
Qty: 90 TABLET | Refills: 1 | Status: SHIPPED | OUTPATIENT
Start: 2023-05-05

## 2023-05-05 RX ORDER — ROSUVASTATIN CALCIUM 5 MG/1
5 TABLET, COATED ORAL NIGHTLY
Qty: 90 TABLET | Refills: 1 | Status: SHIPPED | OUTPATIENT
Start: 2023-05-05 | End: 2023-08-31

## 2023-05-05 NOTE — PROGRESS NOTES
Subjective:       Patient ID: Lyndsey Gan is a 57 y.o. female.    Chief Complaint: Follow-up (X 6 months )    Patient is known, to me and presents with   Chief Complaint   Patient presents with    Follow-up     X 6 months    .  Denies chest pain and shortness of breath. Patient presents with check up and labs. Will have mammogram next week. No complaints and mood is stable. No sihi noted   Follow-up  Pertinent negatives include no arthralgias, chest pain, congestion, coughing, fatigue, fever, headaches, joint swelling, neck pain, numbness or weakness.   Review of Systems   Constitutional:  Negative for activity change, appetite change, fatigue, fever and unexpected weight change.   HENT:  Negative for congestion, ear discharge, ear pain, hearing loss, postnasal drip and tinnitus.    Eyes:  Negative for photophobia, pain and visual disturbance.   Respiratory:  Negative for cough, shortness of breath, wheezing and stridor.    Cardiovascular:  Negative for chest pain, palpitations and leg swelling.   Gastrointestinal:  Negative for abdominal distention.   Genitourinary:  Negative for difficulty urinating, dysuria, frequency, hematuria and urgency.   Musculoskeletal:  Negative for arthralgias, back pain, gait problem, joint swelling and neck pain.   Skin: Negative.    Neurological:  Negative for dizziness, seizures, syncope, weakness, light-headedness, numbness and headaches.   Hematological:  Negative for adenopathy. Does not bruise/bleed easily.   Psychiatric/Behavioral:  Negative for behavioral problems, confusion, dysphoric mood, hallucinations, sleep disturbance and suicidal ideas. The patient is not nervous/anxious.      Objective:      Physical Exam  Constitutional:       General: She is not in acute distress.     Appearance: She is well-developed.   HENT:      Head: Normocephalic and atraumatic.      Right Ear: Tympanic membrane and external ear normal.      Left Ear: Tympanic membrane and external ear  normal.      Nose: Nose normal.      Mouth/Throat:      Mouth: Mucous membranes are moist.      Pharynx: No oropharyngeal exudate.   Eyes:      General: No scleral icterus.        Right eye: No discharge.         Left eye: No discharge.      Conjunctiva/sclera: Conjunctivae normal.      Pupils: Pupils are equal, round, and reactive to light.   Neck:      Thyroid: No thyromegaly.      Vascular: No JVD.   Cardiovascular:      Rate and Rhythm: Normal rate and regular rhythm.      Heart sounds: Normal heart sounds. No murmur heard.    No friction rub. No gallop.   Pulmonary:      Effort: Pulmonary effort is normal. No respiratory distress.      Breath sounds: Normal breath sounds. No stridor. No wheezing or rales.   Chest:      Chest wall: No tenderness.   Abdominal:      General: Bowel sounds are normal. There is no distension.      Palpations: Abdomen is soft. There is no mass.      Tenderness: There is no abdominal tenderness. There is no right CVA tenderness, left CVA tenderness, guarding or rebound.      Hernia: No hernia is present.   Musculoskeletal:         General: No swelling, tenderness, deformity or signs of injury. Normal range of motion.      Cervical back: Normal range of motion and neck supple.      Right lower leg: No edema.      Left lower leg: No edema.   Lymphadenopathy:      Cervical: No cervical adenopathy.   Skin:     General: Skin is warm and dry.      Capillary Refill: Capillary refill takes less than 2 seconds.      Coloration: Skin is not jaundiced or pale.      Findings: No bruising, erythema, lesion or rash.   Neurological:      General: No focal deficit present.      Mental Status: She is alert and oriented to person, place, and time.      Cranial Nerves: No cranial nerve deficit.      Sensory: No sensory deficit.      Motor: No weakness or abnormal muscle tone.      Coordination: Coordination normal.      Gait: Gait normal.      Deep Tendon Reflexes: Reflexes are normal and symmetric.  Reflexes normal.   Psychiatric:         Attention and Perception: Attention and perception normal.         Mood and Affect: Mood normal. Mood is not anxious, depressed or elated. Affect is not labile, blunt, flat, angry, tearful or inappropriate.         Speech: Speech normal.         Behavior: Behavior normal. Behavior is cooperative.         Thought Content: Thought content normal. Thought content is not paranoid or delusional. Thought content does not include homicidal or suicidal ideation. Thought content does not include homicidal or suicidal plan.         Cognition and Memory: Cognition and memory normal.         Judgment: Judgment normal.       Assessment:       1. Benign essential HTN    2. Hyperlipidemia LDL goal <70    3. Prediabetes    4. Bipolar depression    5. BILL (generalized anxiety disorder)    6. Primary insomnia    7. SOB (shortness of breath)        Plan:   1. Benign essential HTN  -     CBC Auto Differential; Future; Expected date: 11/01/2023  -     Comprehensive Metabolic Panel; Future; Expected date: 11/01/2023  -     Microalbumin/Creatinine Ratio, Urine; Future; Expected date: 11/01/2023  -     metoprolol succinate (TOPROL-XL) 50 MG 24 hr tablet; Take 1 tablet (50 mg total) by mouth once daily.  Dispense: 90 tablet; Refill: 1    2. Hyperlipidemia LDL goal <70  -     CBC Auto Differential; Future; Expected date: 11/01/2023  -     Comprehensive Metabolic Panel; Future; Expected date: 11/01/2023  -     TSH; Future; Expected date: 11/01/2023  -     Lipid Panel; Future; Expected date: 11/01/2023  -     rosuvastatin (CRESTOR) 5 MG tablet; Take 1 tablet (5 mg total) by mouth every evening.  Dispense: 90 tablet; Refill: 1    3. Prediabetes  -     CBC Auto Differential; Future; Expected date: 11/01/2023  -     Comprehensive Metabolic Panel; Future; Expected date: 11/01/2023  -     Microalbumin/Creatinine Ratio, Urine; Future; Expected date: 11/01/2023  -     Hemoglobin A1C; Future; Expected date:  "11/01/2023    4. Bipolar depression  -     CBC Auto Differential; Future; Expected date: 11/01/2023  -     Comprehensive Metabolic Panel; Future; Expected date: 11/01/2023  -     ARIPiprazole (ABILIFY) 5 MG Tab; Take 1 tablet (5 mg total) by mouth once daily.  Dispense: 30 tablet; Refill: 5    5. BILL (generalized anxiety disorder)  -     CBC Auto Differential; Future; Expected date: 11/01/2023  -     Comprehensive Metabolic Panel; Future; Expected date: 11/01/2023  -     ARIPiprazole (ABILIFY) 5 MG Tab; Take 1 tablet (5 mg total) by mouth once daily.  Dispense: 30 tablet; Refill: 5    6. Primary insomnia  -     CBC Auto Differential; Future; Expected date: 11/01/2023  -     Comprehensive Metabolic Panel; Future; Expected date: 11/01/2023  -     traZODone (DESYREL) 100 MG tablet; Take 2 tablets (200 mg total) by mouth every evening.  Dispense: 180 tablet; Refill: 1    7. SOB (shortness of breath)  -     albuterol (PROVENTIL/VENTOLIN HFA) 90 mcg/actuation inhaler; Rescue  Dispense: 18 g; Refill: 5       "This note will not be shared with the patient."  Continue with plan of care  Lab drawn today CBC, CMP, TSH, FLP  Limit the cholesterol in your diet to less than 300 mg per day.  Fats should contribute no more than 20 to 35% of your daily calories.  Less than 7 to 10% of your calories should come from saturated fat.  Avoid saturated fat products e.g., butter, some oils, meat, and poultry fat contain a lot of saturated fat.  Check food labels for fat and cholesterol content. Choose the foods with less fat per serving.  Limit the amount of butter and margarine you eat.  Use salad dressings and margarine made with polyunsaturated and monunsaturated fats.  Use egg whites or egg substitutes rather than whole eggs.  Replace whole-milk dairy products with nonfat or low-fat mild, cheese, spreads, and yogurt.  Eat skinless chicken, turkey, fish, and meatless entrees more often than red meat.  Choose lean cuts of meat and trim " off all visible fat. Keep portion sizes moderate.  Avoid fatty desserts such as ice cream, cream-filled cakes, and cheesecakes. Choose fresh fruits, nonfat frozen yogurt, Popsicles, etc.  Reduce the amount of fried foods, vending machine food, and fast food you eat.  Eat fruits and vegetables (especially fresh fruits and leafy vegetables), beans, and whole grains daily. The fiber in these foods helps lower cholesterol.  Look for low-fat or nonfat varieties of the foods you like to eat or look for substitutes.  You may need to exercise 60 minutes a day to prevent weight gain and 90 minutes a day to lose weight.  RTC in six months.

## 2023-05-05 NOTE — TELEPHONE ENCOUNTER
The pharmacy called needing a new Rx for her albuterol that was just sent in due to no directions listed for it just under sig it says rescue  Please advise  Thanks!

## 2023-05-10 ENCOUNTER — OFFICE VISIT (OUTPATIENT)
Dept: OBSTETRICS AND GYNECOLOGY | Facility: CLINIC | Age: 58
End: 2023-05-10
Payer: MEDICAID

## 2023-05-10 ENCOUNTER — HOSPITAL ENCOUNTER (OUTPATIENT)
Dept: RADIOLOGY | Facility: HOSPITAL | Age: 58
Discharge: HOME | End: 2023-05-10
Attending: OBSTETRICS & GYNECOLOGY
Payer: MEDICAID

## 2023-05-10 VITALS
WEIGHT: 133 LBS | RESPIRATION RATE: 14 BRPM | SYSTOLIC BLOOD PRESSURE: 120 MMHG | DIASTOLIC BLOOD PRESSURE: 78 MMHG | HEART RATE: 86 BPM | BODY MASS INDEX: 25.11 KG/M2 | HEIGHT: 61 IN

## 2023-05-10 DIAGNOSIS — Z01.419 WELL WOMAN EXAM WITH ROUTINE GYNECOLOGICAL EXAM: Primary | ICD-10-CM

## 2023-05-10 DIAGNOSIS — Z90.710 HISTORY OF HYSTERECTOMY: ICD-10-CM

## 2023-05-10 DIAGNOSIS — Z12.31 BREAST CANCER SCREENING BY MAMMOGRAM: ICD-10-CM

## 2023-05-10 PROCEDURE — 99213 OFFICE O/P EST LOW 20 MIN: CPT | Mod: PBBFAC | Performed by: OBSTETRICS & GYNECOLOGY

## 2023-05-10 PROCEDURE — 77067 SCR MAMMO BI INCL CAD: CPT | Mod: 26,,, | Performed by: RADIOLOGY

## 2023-05-10 PROCEDURE — 77067 MAMMO DIGITAL SCREENING BILAT WITH TOMO: ICD-10-PCS | Mod: 26,,, | Performed by: RADIOLOGY

## 2023-05-10 PROCEDURE — 1160F PR REVIEW ALL MEDS BY PRESCRIBER/CLIN PHARMACIST DOCUMENTED: ICD-10-PCS | Mod: CPTII,,, | Performed by: OBSTETRICS & GYNECOLOGY

## 2023-05-10 PROCEDURE — 1160F RVW MEDS BY RX/DR IN RCRD: CPT | Mod: CPTII,,, | Performed by: OBSTETRICS & GYNECOLOGY

## 2023-05-10 PROCEDURE — 3044F PR MOST RECENT HEMOGLOBIN A1C LEVEL <7.0%: ICD-10-PCS | Mod: CPTII,,, | Performed by: OBSTETRICS & GYNECOLOGY

## 2023-05-10 PROCEDURE — 3061F NEG MICROALBUMINURIA REV: CPT | Mod: CPTII,,, | Performed by: OBSTETRICS & GYNECOLOGY

## 2023-05-10 PROCEDURE — 3008F PR BODY MASS INDEX (BMI) DOCUMENTED: ICD-10-PCS | Mod: CPTII,,, | Performed by: OBSTETRICS & GYNECOLOGY

## 2023-05-10 PROCEDURE — 3066F PR DOCUMENTATION OF TREATMENT FOR NEPHROPATHY: ICD-10-PCS | Mod: CPTII,,, | Performed by: OBSTETRICS & GYNECOLOGY

## 2023-05-10 PROCEDURE — 99999 PR PBB SHADOW E&M-EST. PATIENT-LVL III: CPT | Mod: PBBFAC,,, | Performed by: OBSTETRICS & GYNECOLOGY

## 2023-05-10 PROCEDURE — 3078F DIAST BP <80 MM HG: CPT | Mod: CPTII,,, | Performed by: OBSTETRICS & GYNECOLOGY

## 2023-05-10 PROCEDURE — 99396 PREV VISIT EST AGE 40-64: CPT | Mod: S$PBB,,, | Performed by: OBSTETRICS & GYNECOLOGY

## 2023-05-10 PROCEDURE — 1159F PR MEDICATION LIST DOCUMENTED IN MEDICAL RECORD: ICD-10-PCS | Mod: CPTII,,, | Performed by: OBSTETRICS & GYNECOLOGY

## 2023-05-10 PROCEDURE — 99999 PR PBB SHADOW E&M-EST. PATIENT-LVL III: ICD-10-PCS | Mod: PBBFAC,,, | Performed by: OBSTETRICS & GYNECOLOGY

## 2023-05-10 PROCEDURE — 77067 SCR MAMMO BI INCL CAD: CPT | Mod: TC

## 2023-05-10 PROCEDURE — 3078F PR MOST RECENT DIASTOLIC BLOOD PRESSURE < 80 MM HG: ICD-10-PCS | Mod: CPTII,,, | Performed by: OBSTETRICS & GYNECOLOGY

## 2023-05-10 PROCEDURE — 3061F PR NEG MICROALBUMINURIA RESULT DOCUMENTED/REVIEW: ICD-10-PCS | Mod: CPTII,,, | Performed by: OBSTETRICS & GYNECOLOGY

## 2023-05-10 PROCEDURE — 3008F BODY MASS INDEX DOCD: CPT | Mod: CPTII,,, | Performed by: OBSTETRICS & GYNECOLOGY

## 2023-05-10 PROCEDURE — 3074F SYST BP LT 130 MM HG: CPT | Mod: CPTII,,, | Performed by: OBSTETRICS & GYNECOLOGY

## 2023-05-10 PROCEDURE — 99396 PR PREVENTIVE VISIT,EST,40-64: ICD-10-PCS | Mod: S$PBB,,, | Performed by: OBSTETRICS & GYNECOLOGY

## 2023-05-10 PROCEDURE — 77063 BREAST TOMOSYNTHESIS BI: CPT | Mod: 26,,, | Performed by: RADIOLOGY

## 2023-05-10 PROCEDURE — 77063 MAMMO DIGITAL SCREENING BILAT WITH TOMO: ICD-10-PCS | Mod: 26,,, | Performed by: RADIOLOGY

## 2023-05-10 PROCEDURE — 3066F NEPHROPATHY DOC TX: CPT | Mod: CPTII,,, | Performed by: OBSTETRICS & GYNECOLOGY

## 2023-05-10 PROCEDURE — 3044F HG A1C LEVEL LT 7.0%: CPT | Mod: CPTII,,, | Performed by: OBSTETRICS & GYNECOLOGY

## 2023-05-10 PROCEDURE — 1159F MED LIST DOCD IN RCRD: CPT | Mod: CPTII,,, | Performed by: OBSTETRICS & GYNECOLOGY

## 2023-05-10 PROCEDURE — 3074F PR MOST RECENT SYSTOLIC BLOOD PRESSURE < 130 MM HG: ICD-10-PCS | Mod: CPTII,,, | Performed by: OBSTETRICS & GYNECOLOGY

## 2023-05-10 NOTE — PROGRESS NOTES
Subjective:    Patient ID: Lyndsey Gan is a 57 y.o. y.o. female.     Chief Complaint: Annual Well Woman Exam     History of Present Illness:  Lyndsey presents today for Annual Well Woman exam. She describes her menses as  absent, h/o LSH .She denies pelvic pain.  She denies breast tenderness, masses, nipple discharge. She denies GYN complaints. She denies difficulty with urination or bowel movements. She denies menopausal symptoms such as hotflashes, vaginal dryness, and night sweats. She denies bloating, early satiety, or weight changes. She is sexually active.      Menstrual History:   No LMP recorded. Patient has had a hysterectomy..     OB History    : 1  Para: 1  Term: 1  Livin  Live Births: 1            The following portions of the patient's history were reviewed and updated as appropriate: allergies, current medications, past family history, past medical history, past social history, past surgical history, and problem list.    ROS:   CONSTITUTIONAL: Negative for fever, chills, diaphoresis, weakness, fatigue, weight loss, weight gain  ENT: negative for sore throat, nasal congestion, nasal discharge, epistaxis, tinnitus, hearing loss  EYES: negative for blurry vision, decreased vision, loss of vision, eye pain, diplopia, photophobia, discharge  SKIN: Negative for rash, itching, hives  RESPIRATORY: negative for cough, hemoptysis, shortness of breath, pleuritic chest pain, wheezing  CARDIOVASCULAR: negative for chest pain, dyspnea on exertion, orthopnea, paroxysmal nocturnal dyspnea, edema, palpitations  BREAST: negative for breast  tenderness, breast mass, nipple discharge, or skin changes  GASTROINTESTINAL: negative for abdominal pain, flank pain, nausea, vomiting, diarrhea, constipation, black stool, blood in stool  GENITOURINARY: negative for abnormal vaginal bleeding, amenorrhea, decreased libido, dysuria, genital sores, hematuria, incontinence, menorrhagia, pelvic pain, urinary  Patient's name and  verified   Patient notified and verbalized an understanding frequency, vaginal discharge  HEMATOLOGIC/LYMPHATIC: negative for swollen lymph nodes, bleeding, bruising  MUSCULOSKELETAL: negative for back pain, joint pain, joint stiffness, joint swelling, muscle pain, muscle weakness  NEUROLOGICAL: negative for dizzy/vertigo, headache, focal weakness, numbness/tingling, speech problems, loss of consciousness, confusion, memory loss  BEHAVORIAL/PSYCH: No psychosis and Positive for anxiety and depression  ENDOCRINE: negative for polydipsia/polyuria, palpitations, skin changes, temperature intolerance, unexpected weight changes  ALLERGIC/IMMUNOLOGIC: negative for urticaria, hay fever, angioedema      Objective:    Vital Signs:  Vitals:    05/10/23 1328   BP: 120/78   Pulse: 86   Resp: 14       Physical Exam:  General:  alert, cooperative, no distress   Skin:  Skin color, texture, turgor normal. No rashes or lesions   HEENT:  extra ocular movement intact, sclera clear, anicteric   Neck: supple, trachea midline, no adenopathy or thyromegally   Respiratory:  Normal effort   Breasts:  no discharge, erythema, tenderness, or palpable masses; no axillary lymphadenopathy   Abdomen:  soft, nontender, no palpable masses   Pelvis: External genitalia: normal general appearance  Urinary system: urethral meatus normal, bladder nontender  Vaginal: normal without tenderness, induration or masses, atrophic mucosa  Cervix: normal appearance  Uterus: removed surgically  Adnexa: non palpable   Extremities: Normal ROM; no edema, no cyanosis   Neurologial: Normal strength and tone. No focal numbness or weakness.   Psychiatric: normal mood, speech, dress, and thought processes         Assessment:       Healthy female exam.     1. Well woman exam with routine gynecological exam    2. History of hysterectomy          Plan:      Well woman exam with routine gynecological exam    History of hysterectomy        Pap 2019 NEM with negative HPV    COUNSELING:  Lyndsey was counseled on A.C.O.G. Pap guidelines  and recommendations for yearly pelvic exams in addition to recommendations for monthly self breast exams; to see her PCP for other health maintenance.

## 2023-08-31 DIAGNOSIS — E78.5 HYPERLIPIDEMIA LDL GOAL <70: ICD-10-CM

## 2023-08-31 RX ORDER — ROSUVASTATIN CALCIUM 5 MG/1
5 TABLET, COATED ORAL NIGHTLY
Qty: 90 TABLET | Refills: 1 | Status: SHIPPED | OUTPATIENT
Start: 2023-08-31 | End: 2024-03-04 | Stop reason: SDUPTHER

## 2023-09-27 ENCOUNTER — TELEPHONE (OUTPATIENT)
Dept: INTERNAL MEDICINE | Facility: CLINIC | Age: 58
End: 2023-09-27
Payer: MEDICAID

## 2023-09-27 DIAGNOSIS — F31.9 BIPOLAR DEPRESSION: ICD-10-CM

## 2023-09-27 DIAGNOSIS — F41.1 GAD (GENERALIZED ANXIETY DISORDER): ICD-10-CM

## 2023-09-27 DIAGNOSIS — N30.01 ACUTE CYSTITIS WITH HEMATURIA: Primary | ICD-10-CM

## 2023-09-27 RX ORDER — ALPRAZOLAM 1 MG/1
TABLET ORAL
Qty: 90 TABLET | Refills: 5 | Status: SHIPPED | OUTPATIENT
Start: 2023-09-27 | End: 2024-03-04 | Stop reason: SDUPTHER

## 2023-09-27 RX ORDER — NITROFURANTOIN (MACROCRYSTALS) 100 MG/1
100 CAPSULE ORAL EVERY 12 HOURS
Qty: 10 CAPSULE | Refills: 0 | Status: SHIPPED | OUTPATIENT
Start: 2023-09-27 | End: 2023-10-02

## 2023-09-27 NOTE — TELEPHONE ENCOUNTER
----- Message from Mariely Collins MA sent at 2023  9:33 AM CDT -----  Lyndsey Gan  MRN: [id number]  : 1965  PCP: Dorothy Wallace  Home Phone      [phone]  Work Phone      Not on file.  Mobile          [phone]      MESSAGE:     Patient is out of refills for Xanax.     [facility] [place])

## 2023-09-27 NOTE — TELEPHONE ENCOUNTER
----- Message from Kelsi Isaac MA sent at 2023  9:39 AM CDT -----    ----- Message -----  From: Mariely Collins MA  Sent: 2023   9:33 AM CDT  To: Rodrigo CHENEY Staff    Lyndsey Gan  MRN: [id number]  : 1965  PCP: Dorothy Wallace.  Home Phone      [phone]  Work Phone      Not on file.  Mobile          [phone]      MESSAGE:     Patient c/o UTI,  Requesting antibiotics.    Please Advise:   [phone]    Send to [facility] ([place])

## 2023-11-06 ENCOUNTER — CLINICAL SUPPORT (OUTPATIENT)
Dept: INTERNAL MEDICINE | Facility: CLINIC | Age: 58
End: 2023-11-06
Payer: MEDICAID

## 2023-11-06 DIAGNOSIS — F51.01 PRIMARY INSOMNIA: ICD-10-CM

## 2023-11-06 DIAGNOSIS — F41.1 GAD (GENERALIZED ANXIETY DISORDER): ICD-10-CM

## 2023-11-06 DIAGNOSIS — R73.03 PREDIABETES: ICD-10-CM

## 2023-11-06 DIAGNOSIS — E78.5 HYPERLIPIDEMIA LDL GOAL <70: ICD-10-CM

## 2023-11-06 DIAGNOSIS — I10 BENIGN ESSENTIAL HTN: ICD-10-CM

## 2023-11-06 DIAGNOSIS — F31.9 BIPOLAR DEPRESSION: ICD-10-CM

## 2023-11-06 LAB
ALBUMIN SERPL BCP-MCNC: 4.1 G/DL (ref 3.5–5.2)
ALBUMIN/CREAT UR: 162.8 UG/MG (ref 0–30)
ALP SERPL-CCNC: 55 U/L (ref 55–135)
ALT SERPL W/O P-5'-P-CCNC: 22 U/L (ref 10–44)
ANION GAP SERPL CALC-SCNC: 9 MMOL/L (ref 8–16)
AST SERPL-CCNC: 30 U/L (ref 10–40)
BASOPHILS # BLD AUTO: 0.03 K/UL (ref 0–0.2)
BASOPHILS NFR BLD: 0.4 % (ref 0–1.9)
BILIRUB SERPL-MCNC: 0.6 MG/DL (ref 0.1–1)
BUN SERPL-MCNC: 10 MG/DL (ref 6–20)
CALCIUM SERPL-MCNC: 9.9 MG/DL (ref 8.7–10.5)
CHLORIDE SERPL-SCNC: 106 MMOL/L (ref 95–110)
CHOLEST SERPL-MCNC: 160 MG/DL (ref 120–199)
CHOLEST/HDLC SERPL: 2.9 {RATIO} (ref 2–5)
CO2 SERPL-SCNC: 28 MMOL/L (ref 23–29)
CREAT SERPL-MCNC: 0.8 MG/DL (ref 0.5–1.4)
CREAT UR-MCNC: 121 MG/DL (ref 15–325)
DIFFERENTIAL METHOD: ABNORMAL
EOSINOPHIL # BLD AUTO: 0.2 K/UL (ref 0–0.5)
EOSINOPHIL NFR BLD: 2.3 % (ref 0–8)
ERYTHROCYTE [DISTWIDTH] IN BLOOD BY AUTOMATED COUNT: 11.4 % (ref 11.5–14.5)
EST. GFR  (NO RACE VARIABLE): >60 ML/MIN/1.73 M^2
ESTIMATED AVG GLUCOSE: 91 MG/DL (ref 68–131)
GLUCOSE SERPL-MCNC: 103 MG/DL (ref 70–110)
HBA1C MFR BLD: 4.8 % (ref 4–5.6)
HCT VFR BLD AUTO: 47.2 % (ref 37–48.5)
HDLC SERPL-MCNC: 55 MG/DL (ref 40–75)
HDLC SERPL: 34.4 % (ref 20–50)
HGB BLD-MCNC: 15.5 G/DL (ref 12–16)
IMM GRANULOCYTES # BLD AUTO: 0.02 K/UL (ref 0–0.04)
IMM GRANULOCYTES NFR BLD AUTO: 0.3 % (ref 0–0.5)
LDLC SERPL CALC-MCNC: 69 MG/DL (ref 63–159)
LYMPHOCYTES # BLD AUTO: 1.6 K/UL (ref 1–4.8)
LYMPHOCYTES NFR BLD: 23.3 % (ref 18–48)
MCH RBC QN AUTO: 32.2 PG (ref 27–31)
MCHC RBC AUTO-ENTMCNC: 32.8 G/DL (ref 32–36)
MCV RBC AUTO: 98 FL (ref 82–98)
MICROALBUMIN UR DL<=1MG/L-MCNC: 197 UG/ML
MONOCYTES # BLD AUTO: 0.5 K/UL (ref 0.3–1)
MONOCYTES NFR BLD: 7.6 % (ref 4–15)
NEUTROPHILS # BLD AUTO: 4.6 K/UL (ref 1.8–7.7)
NEUTROPHILS NFR BLD: 66.1 % (ref 38–73)
NONHDLC SERPL-MCNC: 105 MG/DL
NRBC BLD-RTO: 0 /100 WBC
PLATELET # BLD AUTO: 163 K/UL (ref 150–450)
PMV BLD AUTO: 10.9 FL (ref 9.2–12.9)
POTASSIUM SERPL-SCNC: 3.9 MMOL/L (ref 3.5–5.1)
PROT SERPL-MCNC: 6.9 G/DL (ref 6–8.4)
RBC # BLD AUTO: 4.82 M/UL (ref 4–5.4)
SODIUM SERPL-SCNC: 143 MMOL/L (ref 136–145)
TRIGL SERPL-MCNC: 180 MG/DL (ref 30–150)
TSH SERPL DL<=0.005 MIU/L-ACNC: 0.85 UIU/ML (ref 0.4–4)
WBC # BLD AUTO: 6.99 K/UL (ref 3.9–12.7)

## 2023-11-06 PROCEDURE — 84443 ASSAY THYROID STIM HORMONE: CPT | Performed by: NURSE PRACTITIONER

## 2023-11-06 PROCEDURE — 80053 COMPREHEN METABOLIC PANEL: CPT | Performed by: NURSE PRACTITIONER

## 2023-11-06 PROCEDURE — 82043 UR ALBUMIN QUANTITATIVE: CPT | Performed by: NURSE PRACTITIONER

## 2023-11-06 PROCEDURE — 83036 HEMOGLOBIN GLYCOSYLATED A1C: CPT | Performed by: NURSE PRACTITIONER

## 2023-11-06 PROCEDURE — 36415 COLL VENOUS BLD VENIPUNCTURE: CPT | Performed by: NURSE PRACTITIONER

## 2023-11-06 PROCEDURE — 85025 COMPLETE CBC W/AUTO DIFF WBC: CPT | Performed by: NURSE PRACTITIONER

## 2023-11-06 PROCEDURE — 80061 LIPID PANEL: CPT | Performed by: NURSE PRACTITIONER

## 2023-11-14 ENCOUNTER — OFFICE VISIT (OUTPATIENT)
Dept: INTERNAL MEDICINE | Facility: CLINIC | Age: 58
End: 2023-11-14
Payer: MEDICAID

## 2023-11-14 VITALS
HEART RATE: 91 BPM | DIASTOLIC BLOOD PRESSURE: 76 MMHG | SYSTOLIC BLOOD PRESSURE: 134 MMHG | OXYGEN SATURATION: 95 % | RESPIRATION RATE: 20 BRPM | HEIGHT: 61 IN | BODY MASS INDEX: 22.43 KG/M2 | WEIGHT: 118.81 LBS

## 2023-11-14 DIAGNOSIS — I10 BENIGN ESSENTIAL HTN: Primary | ICD-10-CM

## 2023-11-14 DIAGNOSIS — N39.0 FREQUENT UTI: ICD-10-CM

## 2023-11-14 DIAGNOSIS — R73.03 PREDIABETES: ICD-10-CM

## 2023-11-14 DIAGNOSIS — E78.5 HYPERLIPIDEMIA LDL GOAL <70: ICD-10-CM

## 2023-11-14 DIAGNOSIS — F41.1 GAD (GENERALIZED ANXIETY DISORDER): ICD-10-CM

## 2023-11-14 DIAGNOSIS — R80.9 MICROALBUMINURIA: ICD-10-CM

## 2023-11-14 DIAGNOSIS — F31.9 BIPOLAR DEPRESSION: ICD-10-CM

## 2023-11-14 PROCEDURE — 3060F POS MICROALBUMINURIA REV: CPT | Mod: CPTII,,, | Performed by: NURSE PRACTITIONER

## 2023-11-14 PROCEDURE — 3075F SYST BP GE 130 - 139MM HG: CPT | Mod: CPTII,,, | Performed by: NURSE PRACTITIONER

## 2023-11-14 PROCEDURE — 99999 PR PBB SHADOW E&M-EST. PATIENT-LVL III: ICD-10-PCS | Mod: PBBFAC,,, | Performed by: NURSE PRACTITIONER

## 2023-11-14 PROCEDURE — 3066F NEPHROPATHY DOC TX: CPT | Mod: CPTII,,, | Performed by: NURSE PRACTITIONER

## 2023-11-14 PROCEDURE — 3078F PR MOST RECENT DIASTOLIC BLOOD PRESSURE < 80 MM HG: ICD-10-PCS | Mod: CPTII,,, | Performed by: NURSE PRACTITIONER

## 2023-11-14 PROCEDURE — 1159F MED LIST DOCD IN RCRD: CPT | Mod: CPTII,,, | Performed by: NURSE PRACTITIONER

## 2023-11-14 PROCEDURE — 99214 PR OFFICE/OUTPT VISIT, EST, LEVL IV, 30-39 MIN: ICD-10-PCS | Mod: S$PBB,,, | Performed by: NURSE PRACTITIONER

## 2023-11-14 PROCEDURE — 3075F PR MOST RECENT SYSTOLIC BLOOD PRESS GE 130-139MM HG: ICD-10-PCS | Mod: CPTII,,, | Performed by: NURSE PRACTITIONER

## 2023-11-14 PROCEDURE — 3066F PR DOCUMENTATION OF TREATMENT FOR NEPHROPATHY: ICD-10-PCS | Mod: CPTII,,, | Performed by: NURSE PRACTITIONER

## 2023-11-14 PROCEDURE — 3008F PR BODY MASS INDEX (BMI) DOCUMENTED: ICD-10-PCS | Mod: CPTII,,, | Performed by: NURSE PRACTITIONER

## 2023-11-14 PROCEDURE — 3044F HG A1C LEVEL LT 7.0%: CPT | Mod: CPTII,,, | Performed by: NURSE PRACTITIONER

## 2023-11-14 PROCEDURE — 1159F PR MEDICATION LIST DOCUMENTED IN MEDICAL RECORD: ICD-10-PCS | Mod: CPTII,,, | Performed by: NURSE PRACTITIONER

## 2023-11-14 PROCEDURE — 99213 OFFICE O/P EST LOW 20 MIN: CPT | Mod: PBBFAC,PN | Performed by: NURSE PRACTITIONER

## 2023-11-14 PROCEDURE — 3060F PR POS MICROALBUMINURIA RESULT DOCUMENTED/REVIEW: ICD-10-PCS | Mod: CPTII,,, | Performed by: NURSE PRACTITIONER

## 2023-11-14 PROCEDURE — 99214 OFFICE O/P EST MOD 30 MIN: CPT | Mod: S$PBB,,, | Performed by: NURSE PRACTITIONER

## 2023-11-14 PROCEDURE — 3008F BODY MASS INDEX DOCD: CPT | Mod: CPTII,,, | Performed by: NURSE PRACTITIONER

## 2023-11-14 PROCEDURE — 99999 PR PBB SHADOW E&M-EST. PATIENT-LVL III: CPT | Mod: PBBFAC,,, | Performed by: NURSE PRACTITIONER

## 2023-11-14 PROCEDURE — 3044F PR MOST RECENT HEMOGLOBIN A1C LEVEL <7.0%: ICD-10-PCS | Mod: CPTII,,, | Performed by: NURSE PRACTITIONER

## 2023-11-14 PROCEDURE — 3078F DIAST BP <80 MM HG: CPT | Mod: CPTII,,, | Performed by: NURSE PRACTITIONER

## 2023-11-14 RX ORDER — ARIPIPRAZOLE 10 MG/1
10 TABLET ORAL DAILY
Qty: 30 TABLET | Refills: 5 | Status: SHIPPED | OUTPATIENT
Start: 2023-11-14 | End: 2024-11-13

## 2023-11-14 NOTE — PROGRESS NOTES
Subjective:       Patient ID: Lyndsey Gan is a 58 y.o. female.    Chief Complaint: Follow-up (X 6 months/)    Patient is known, to me and presents with   Chief Complaint   Patient presents with    Follow-up     X 6 months     .  Denies chest pain and shortness of breath. Patient presents with checkup and labs. States that the past year she has had uti's and they keep coming back. She does see her gyn as well. Blood pressure is well controlled and she only drinks water now. Blood sugars are normal. She states that she would like to go up on her abilify due to worsening depression. Mood is stable today and no sihi noted   Follow-up  Pertinent negatives include no arthralgias, chest pain, congestion, coughing, fatigue, fever, headaches, joint swelling, neck pain, numbness or weakness.     Review of Systems   Constitutional:  Negative for activity change, appetite change, fatigue, fever and unexpected weight change.   HENT:  Negative for congestion, ear discharge, ear pain, hearing loss, postnasal drip and tinnitus.    Eyes:  Negative for photophobia, pain and visual disturbance.   Respiratory:  Negative for cough, shortness of breath, wheezing and stridor.    Cardiovascular:  Negative for chest pain, palpitations and leg swelling.   Gastrointestinal:  Negative for abdominal distention.   Genitourinary:  Negative for difficulty urinating, dysuria, frequency, hematuria and urgency.   Musculoskeletal:  Negative for arthralgias, back pain, gait problem, joint swelling and neck pain.   Skin: Negative.    Neurological:  Negative for dizziness, seizures, syncope, weakness, light-headedness, numbness and headaches.   Hematological:  Negative for adenopathy. Does not bruise/bleed easily.   Psychiatric/Behavioral:  Positive for dysphoric mood. Negative for behavioral problems, confusion, hallucinations, sleep disturbance and suicidal ideas. The patient is nervous/anxious.        Objective:      Physical  Exam  Constitutional:       General: She is not in acute distress.     Appearance: She is well-developed.   HENT:      Head: Normocephalic and atraumatic.      Right Ear: Tympanic membrane and external ear normal.      Left Ear: Tympanic membrane and external ear normal.      Nose: Nose normal.      Mouth/Throat:      Mouth: Mucous membranes are moist.      Pharynx: No oropharyngeal exudate.   Eyes:      General: No scleral icterus.        Right eye: No discharge.         Left eye: No discharge.      Conjunctiva/sclera: Conjunctivae normal.      Pupils: Pupils are equal, round, and reactive to light.   Neck:      Thyroid: No thyromegaly.      Vascular: No JVD.   Cardiovascular:      Rate and Rhythm: Normal rate and regular rhythm.      Heart sounds: Normal heart sounds. No murmur heard.     No friction rub. No gallop.   Pulmonary:      Effort: Pulmonary effort is normal. No respiratory distress.      Breath sounds: Normal breath sounds. No stridor. No wheezing or rales.   Chest:      Chest wall: No tenderness.   Abdominal:      General: Bowel sounds are normal. There is no distension.      Palpations: Abdomen is soft. There is no mass.      Tenderness: There is no abdominal tenderness. There is no right CVA tenderness, left CVA tenderness, guarding or rebound.      Hernia: No hernia is present.   Musculoskeletal:         General: No swelling, tenderness, deformity or signs of injury. Normal range of motion.      Cervical back: Normal range of motion and neck supple.      Right lower leg: No edema.      Left lower leg: No edema.   Lymphadenopathy:      Cervical: No cervical adenopathy.   Skin:     General: Skin is warm and dry.      Capillary Refill: Capillary refill takes less than 2 seconds.      Coloration: Skin is not jaundiced or pale.      Findings: No bruising, erythema, lesion or rash.   Neurological:      General: No focal deficit present.      Mental Status: She is alert and oriented to person, place, and  time.      Cranial Nerves: No cranial nerve deficit.      Sensory: No sensory deficit.      Motor: No weakness or abnormal muscle tone.      Coordination: Coordination normal.      Gait: Gait normal.      Deep Tendon Reflexes: Reflexes are normal and symmetric. Reflexes normal.   Psychiatric:         Attention and Perception: She does not perceive auditory or visual hallucinations.         Mood and Affect: Mood is anxious and depressed. Mood is not elated. Affect is flat and tearful. Affect is not labile, blunt, angry or inappropriate.         Speech: Speech normal.         Behavior: Behavior normal. Behavior is cooperative.         Thought Content: Thought content normal. Thought content is not paranoid or delusional. Thought content does not include homicidal or suicidal ideation. Thought content does not include homicidal or suicidal plan.         Cognition and Memory: Cognition and memory normal.         Judgment: Judgment normal.         Assessment:       1. Benign essential HTN    2. Hyperlipidemia LDL goal <70    3. Prediabetes    4. BILL (generalized anxiety disorder)    5. Bipolar depression    6. Microalbuminuria    7. Frequent UTI        Plan:   1. Benign essential HTN  -     CBC Auto Differential; Future; Expected date: 05/12/2024  -     Comprehensive Metabolic Panel; Future; Expected date: 05/12/2024  -     Microalbumin/Creatinine Ratio, Urine; Future; Expected date: 05/12/2024    2. Hyperlipidemia LDL goal <70  -     CBC Auto Differential; Future; Expected date: 05/12/2024  -     Comprehensive Metabolic Panel; Future; Expected date: 05/12/2024  -     TSH; Future; Expected date: 05/12/2024  -     Lipid Panel; Future; Expected date: 05/12/2024    3. Prediabetes  -     CBC Auto Differential; Future; Expected date: 05/12/2024  -     Comprehensive Metabolic Panel; Future; Expected date: 05/12/2024  -     Microalbumin/Creatinine Ratio, Urine; Future; Expected date: 05/12/2024  -     Hemoglobin A1C; Future;  "Expected date: 05/12/2024    4. BILL (generalized anxiety disorder)  -     CBC Auto Differential; Future; Expected date: 05/12/2024  -     Comprehensive Metabolic Panel; Future; Expected date: 05/12/2024  -     ARIPiprazole (ABILIFY) 10 MG Tab; Take 1 tablet (10 mg total) by mouth once daily.  Dispense: 30 tablet; Refill: 5    5. Bipolar depression  -     CBC Auto Differential; Future; Expected date: 05/12/2024  -     Comprehensive Metabolic Panel; Future; Expected date: 05/12/2024  -     ARIPiprazole (ABILIFY) 10 MG Tab; Take 1 tablet (10 mg total) by mouth once daily.  Dispense: 30 tablet; Refill: 5    6. Microalbuminuria  -     CBC Auto Differential; Future; Expected date: 05/12/2024  -     Comprehensive Metabolic Panel; Future; Expected date: 05/12/2024  -     Microalbumin/Creatinine Ratio, Urine; Future; Expected date: 05/12/2024  -     Microalbumin/Creatinine Ratio, Urine; Future; Expected date: 12/14/2023  -     US Kidney; Future; Expected date: 11/14/2023    7. Frequent UTI  -     CBC Auto Differential; Future; Expected date: 05/12/2024  -     Comprehensive Metabolic Panel; Future; Expected date: 05/12/2024  -     Microalbumin/Creatinine Ratio, Urine; Future; Expected date: 12/14/2023  -     US Kidney; Future; Expected date: 11/14/2023       "This note will not be shared with the patient."  My concern is microalbuminuria which she didn't have before  Will order further testing and may need referral to urology  or nephrology  Keep drinking water  Refills on meds.  She is normotensive without medications and weight loss   Medication compliance was discussed with the patient.   Medication side effects were discussed.  The patient was instructed on using exercise frequently to reduce blood pressure.  Thirty to forty-five minutes of brisk walking three to four times a week is often helpful to lower your blood pressure.  Monitor blood pressures at home and to record the values in a log.  The patient was instructed " to monitor weight closely and to try to keep it as close to ideal body weight as possible.  Reduce salt intake to less than 2 grams per day.  Do not add salt to food at the table.  Reduce or get rid of salt used in cooking.  Limit processed and fast foods.  Read package labels for amount of salt (soduim) in foods.  Losing weight, even just 10 pounds, of can decrease blood pressure.   Once I review tests and if microalb is still elevated in one month will make necessary referrals  Rtc as scheduled   Mid-Level Procedure Text (D): After obtaining clear surgical margins the patient was sent to a mid-level provider for surgical repair.  The patient understands they will receive post-surgical care and follow-up from the mid-level provider.

## 2023-11-17 ENCOUNTER — HOSPITAL ENCOUNTER (OUTPATIENT)
Dept: RADIOLOGY | Facility: HOSPITAL | Age: 58
Discharge: HOME | End: 2023-11-17
Attending: NURSE PRACTITIONER
Payer: MEDICAID

## 2023-11-17 DIAGNOSIS — N39.0 FREQUENT UTI: ICD-10-CM

## 2023-11-17 DIAGNOSIS — R80.9 MICROALBUMINURIA: ICD-10-CM

## 2023-11-17 PROCEDURE — 76770 US EXAM ABDO BACK WALL COMP: CPT | Mod: TC

## 2023-11-22 ENCOUNTER — TELEPHONE (OUTPATIENT)
Dept: INTERNAL MEDICINE | Facility: CLINIC | Age: 58
End: 2023-11-22
Payer: MEDICAID

## 2023-11-22 DIAGNOSIS — R80.9 MICROALBUMINURIA: Primary | ICD-10-CM

## 2023-12-05 ENCOUNTER — CLINICAL SUPPORT (OUTPATIENT)
Dept: INTERNAL MEDICINE | Facility: CLINIC | Age: 58
End: 2023-12-05
Payer: MEDICAID

## 2023-12-05 ENCOUNTER — TELEPHONE (OUTPATIENT)
Dept: INTERNAL MEDICINE | Facility: CLINIC | Age: 58
End: 2023-12-05
Payer: MEDICAID

## 2023-12-05 DIAGNOSIS — R80.9 MICROALBUMINURIA: ICD-10-CM

## 2023-12-05 DIAGNOSIS — N39.0 FREQUENT UTI: ICD-10-CM

## 2023-12-05 LAB
ALBUMIN/CREAT UR: 24.4 UG/MG (ref 0–30)
CREAT UR-MCNC: 201 MG/DL (ref 15–325)
MICROALBUMIN UR DL<=1MG/L-MCNC: 49 UG/ML

## 2023-12-05 PROCEDURE — 82043 UR ALBUMIN QUANTITATIVE: CPT | Performed by: NURSE PRACTITIONER

## 2023-12-06 ENCOUNTER — TELEPHONE (OUTPATIENT)
Dept: INTERNAL MEDICINE | Facility: CLINIC | Age: 58
End: 2023-12-06
Payer: MEDICAID

## 2023-12-06 DIAGNOSIS — N30.01 ACUTE CYSTITIS WITH HEMATURIA: Primary | ICD-10-CM

## 2023-12-06 RX ORDER — NITROFURANTOIN (MACROCRYSTALS) 100 MG/1
100 CAPSULE ORAL EVERY 12 HOURS
Qty: 10 CAPSULE | Refills: 0 | Status: SHIPPED | OUTPATIENT
Start: 2023-12-06 | End: 2023-12-06

## 2023-12-06 RX ORDER — CIPROFLOXACIN 500 MG/1
500 TABLET ORAL 2 TIMES DAILY
Qty: 10 TABLET | Refills: 0 | Status: SHIPPED | OUTPATIENT
Start: 2023-12-06 | End: 2023-12-11

## 2023-12-06 NOTE — TELEPHONE ENCOUNTER
----- Message from Kelsi Isaac MA sent at 2023  3:20 PM CST -----    ----- Message -----  From: Mariely Collins MA  Sent: 2023   3:14 PM CST  To: [facility]ENE[facility] Staff    Lyndsey Gan  MRN: [id number]  : 1965  PCP: Dorothy Wallace.  Home Phone      [phone]  Work Phone      Not on file.  Mobile          [phone]      MESSAGE:     Patient c/o pressure to lower abdomen and blood in urine since she woke up this morning.     Thinks she may have a UTI.    Please Advise:  [phone]    Send meds to [facility] ([place])

## 2023-12-28 DIAGNOSIS — F51.01 PRIMARY INSOMNIA: ICD-10-CM

## 2023-12-28 RX ORDER — TRAZODONE HYDROCHLORIDE 100 MG/1
200 TABLET ORAL NIGHTLY
Qty: 180 TABLET | Refills: 1 | Status: SHIPPED | OUTPATIENT
Start: 2023-12-28 | End: 2024-12-27

## 2024-03-04 ENCOUNTER — TELEPHONE (OUTPATIENT)
Dept: INTERNAL MEDICINE | Facility: CLINIC | Age: 59
End: 2024-03-04
Payer: MEDICAID

## 2024-03-04 DIAGNOSIS — E78.5 HYPERLIPIDEMIA LDL GOAL <70: ICD-10-CM

## 2024-03-04 DIAGNOSIS — F41.1 GAD (GENERALIZED ANXIETY DISORDER): ICD-10-CM

## 2024-03-04 DIAGNOSIS — F31.9 BIPOLAR DEPRESSION: ICD-10-CM

## 2024-03-04 RX ORDER — ALPRAZOLAM 1 MG/1
TABLET ORAL
Qty: 90 TABLET | Refills: 5 | Status: SHIPPED | OUTPATIENT
Start: 2024-03-04

## 2024-03-04 RX ORDER — ROSUVASTATIN CALCIUM 5 MG/1
5 TABLET, COATED ORAL NIGHTLY
Qty: 90 TABLET | Refills: 1 | Status: SHIPPED | OUTPATIENT
Start: 2024-03-04

## 2024-03-04 NOTE — TELEPHONE ENCOUNTER
----- Message from Mariely Collins MA sent at 3/4/2024  9:27 AM CST -----  Lyndsey Gan  MRN: [id number]  : 1965  PCP: Dorothy Wallace  Home Phone      [phone]  Work Phone      Not on file.  Mobile          [phone]      MESSAGE:     Patient has no refills left on Rosuvastatin and Xanax.     Send new scripts to [facility] ([place])

## 2024-05-05 DIAGNOSIS — F31.9 BIPOLAR DEPRESSION: ICD-10-CM

## 2024-05-05 DIAGNOSIS — R06.02 SOB (SHORTNESS OF BREATH): ICD-10-CM

## 2024-05-05 DIAGNOSIS — F41.1 GAD (GENERALIZED ANXIETY DISORDER): ICD-10-CM

## 2024-05-06 RX ORDER — ARIPIPRAZOLE 5 MG/1
5 TABLET ORAL
Qty: 30 TABLET | Refills: 5 | Status: SHIPPED | OUTPATIENT
Start: 2024-05-06 | End: 2024-05-21

## 2024-05-06 RX ORDER — ALBUTEROL SULFATE 90 UG/1
AEROSOL, METERED RESPIRATORY (INHALATION)
Qty: 18 G | Refills: 5 | Status: SHIPPED | OUTPATIENT
Start: 2024-05-06

## 2024-05-08 DIAGNOSIS — F31.9 BIPOLAR DEPRESSION: ICD-10-CM

## 2024-05-08 DIAGNOSIS — F41.1 GAD (GENERALIZED ANXIETY DISORDER): ICD-10-CM

## 2024-05-08 RX ORDER — ARIPIPRAZOLE 10 MG/1
10 TABLET ORAL
Qty: 90 TABLET | Refills: 1 | Status: SHIPPED | OUTPATIENT
Start: 2024-05-08

## 2024-05-13 DIAGNOSIS — F51.01 PRIMARY INSOMNIA: ICD-10-CM

## 2024-05-13 RX ORDER — TRAZODONE HYDROCHLORIDE 100 MG/1
200 TABLET ORAL NIGHTLY
Qty: 180 TABLET | Refills: 1 | Status: SHIPPED | OUTPATIENT
Start: 2024-05-13 | End: 2025-05-13

## 2024-05-14 ENCOUNTER — CLINICAL SUPPORT (OUTPATIENT)
Dept: INTERNAL MEDICINE | Facility: CLINIC | Age: 59
End: 2024-05-14
Payer: MEDICAID

## 2024-05-14 DIAGNOSIS — R73.03 PREDIABETES: ICD-10-CM

## 2024-05-14 DIAGNOSIS — I10 BENIGN ESSENTIAL HTN: ICD-10-CM

## 2024-05-14 DIAGNOSIS — R80.9 MICROALBUMINURIA: ICD-10-CM

## 2024-05-14 DIAGNOSIS — F41.1 GAD (GENERALIZED ANXIETY DISORDER): ICD-10-CM

## 2024-05-14 DIAGNOSIS — F31.9 BIPOLAR DEPRESSION: ICD-10-CM

## 2024-05-14 DIAGNOSIS — E78.5 HYPERLIPIDEMIA LDL GOAL <70: ICD-10-CM

## 2024-05-14 DIAGNOSIS — N39.0 FREQUENT UTI: ICD-10-CM

## 2024-05-14 LAB
ALBUMIN SERPL BCP-MCNC: 4.2 G/DL (ref 3.5–5.2)
ALBUMIN/CREAT UR: NORMAL UG/MG (ref 0–30)
ALP SERPL-CCNC: 60 U/L (ref 55–135)
ALT SERPL W/O P-5'-P-CCNC: 15 U/L (ref 10–44)
ANION GAP SERPL CALC-SCNC: 12 MMOL/L (ref 8–16)
AST SERPL-CCNC: 29 U/L (ref 10–40)
BASOPHILS # BLD AUTO: 0.03 K/UL (ref 0–0.2)
BASOPHILS NFR BLD: 0.5 % (ref 0–1.9)
BILIRUB SERPL-MCNC: 0.6 MG/DL (ref 0.1–1)
BUN SERPL-MCNC: 11 MG/DL (ref 6–20)
CALCIUM SERPL-MCNC: 10 MG/DL (ref 8.7–10.5)
CHLORIDE SERPL-SCNC: 105 MMOL/L (ref 95–110)
CHOLEST SERPL-MCNC: 165 MG/DL (ref 120–199)
CHOLEST/HDLC SERPL: 2.8 {RATIO} (ref 2–5)
CO2 SERPL-SCNC: 25 MMOL/L (ref 23–29)
CREAT SERPL-MCNC: 0.8 MG/DL (ref 0.5–1.4)
CREAT UR-MCNC: 41 MG/DL (ref 15–325)
DIFFERENTIAL METHOD BLD: ABNORMAL
EOSINOPHIL # BLD AUTO: 0.1 K/UL (ref 0–0.5)
EOSINOPHIL NFR BLD: 2.2 % (ref 0–8)
ERYTHROCYTE [DISTWIDTH] IN BLOOD BY AUTOMATED COUNT: 11.3 % (ref 11.5–14.5)
EST. GFR  (NO RACE VARIABLE): >60 ML/MIN/1.73 M^2
ESTIMATED AVG GLUCOSE: 94 MG/DL (ref 68–131)
GLUCOSE SERPL-MCNC: 97 MG/DL (ref 70–110)
HBA1C MFR BLD: 4.9 % (ref 4–5.6)
HCT VFR BLD AUTO: 47.4 % (ref 37–48.5)
HDLC SERPL-MCNC: 58 MG/DL (ref 40–75)
HDLC SERPL: 35.2 % (ref 20–50)
HGB BLD-MCNC: 15.8 G/DL (ref 12–16)
IMM GRANULOCYTES # BLD AUTO: 0.02 K/UL (ref 0–0.04)
IMM GRANULOCYTES NFR BLD AUTO: 0.3 % (ref 0–0.5)
LDLC SERPL CALC-MCNC: 79.4 MG/DL (ref 63–159)
LYMPHOCYTES # BLD AUTO: 1.7 K/UL (ref 1–4.8)
LYMPHOCYTES NFR BLD: 29.9 % (ref 18–48)
MCH RBC QN AUTO: 33.1 PG (ref 27–31)
MCHC RBC AUTO-ENTMCNC: 33.3 G/DL (ref 32–36)
MCV RBC AUTO: 99 FL (ref 82–98)
MICROALBUMIN UR DL<=1MG/L-MCNC: <5 UG/ML
MONOCYTES # BLD AUTO: 0.5 K/UL (ref 0.3–1)
MONOCYTES NFR BLD: 8.5 % (ref 4–15)
NEUTROPHILS # BLD AUTO: 3.4 K/UL (ref 1.8–7.7)
NEUTROPHILS NFR BLD: 58.6 % (ref 38–73)
NONHDLC SERPL-MCNC: 107 MG/DL
NRBC BLD-RTO: 0 /100 WBC
PLATELET # BLD AUTO: 148 K/UL (ref 150–450)
PMV BLD AUTO: 11.6 FL (ref 9.2–12.9)
POTASSIUM SERPL-SCNC: 4.2 MMOL/L (ref 3.5–5.1)
PROT SERPL-MCNC: 7.1 G/DL (ref 6–8.4)
RBC # BLD AUTO: 4.78 M/UL (ref 4–5.4)
SODIUM SERPL-SCNC: 142 MMOL/L (ref 136–145)
TRIGL SERPL-MCNC: 138 MG/DL (ref 30–150)
TSH SERPL DL<=0.005 MIU/L-ACNC: 1.93 UIU/ML (ref 0.4–4)
WBC # BLD AUTO: 5.79 K/UL (ref 3.9–12.7)

## 2024-05-14 PROCEDURE — 85025 COMPLETE CBC W/AUTO DIFF WBC: CPT | Performed by: NURSE PRACTITIONER

## 2024-05-14 PROCEDURE — 36415 COLL VENOUS BLD VENIPUNCTURE: CPT | Performed by: NURSE PRACTITIONER

## 2024-05-14 PROCEDURE — 83036 HEMOGLOBIN GLYCOSYLATED A1C: CPT | Performed by: NURSE PRACTITIONER

## 2024-05-14 PROCEDURE — 82043 UR ALBUMIN QUANTITATIVE: CPT | Performed by: NURSE PRACTITIONER

## 2024-05-14 PROCEDURE — 80061 LIPID PANEL: CPT | Performed by: NURSE PRACTITIONER

## 2024-05-14 PROCEDURE — 84443 ASSAY THYROID STIM HORMONE: CPT | Performed by: NURSE PRACTITIONER

## 2024-05-14 PROCEDURE — 80053 COMPREHEN METABOLIC PANEL: CPT | Performed by: NURSE PRACTITIONER

## 2024-05-21 ENCOUNTER — OFFICE VISIT (OUTPATIENT)
Dept: INTERNAL MEDICINE | Facility: CLINIC | Age: 59
End: 2024-05-21
Payer: MEDICAID

## 2024-05-21 VITALS
HEART RATE: 68 BPM | BODY MASS INDEX: 21.23 KG/M2 | OXYGEN SATURATION: 96 % | RESPIRATION RATE: 20 BRPM | HEIGHT: 61 IN | WEIGHT: 112.44 LBS

## 2024-05-21 DIAGNOSIS — Z12.39 ENCOUNTER FOR SCREENING FOR MALIGNANT NEOPLASM OF BREAST, UNSPECIFIED SCREENING MODALITY: ICD-10-CM

## 2024-05-21 DIAGNOSIS — F31.9 BIPOLAR DEPRESSION: ICD-10-CM

## 2024-05-21 DIAGNOSIS — F41.1 GAD (GENERALIZED ANXIETY DISORDER): ICD-10-CM

## 2024-05-21 DIAGNOSIS — E78.5 HYPERLIPIDEMIA LDL GOAL <70: ICD-10-CM

## 2024-05-21 DIAGNOSIS — R73.03 PREDIABETES: ICD-10-CM

## 2024-05-21 DIAGNOSIS — I10 BENIGN ESSENTIAL HTN: Primary | ICD-10-CM

## 2024-05-21 PROCEDURE — 99213 OFFICE O/P EST LOW 20 MIN: CPT | Mod: PBBFAC,PN | Performed by: NURSE PRACTITIONER

## 2024-05-21 PROCEDURE — 99214 OFFICE O/P EST MOD 30 MIN: CPT | Mod: S$PBB,,, | Performed by: NURSE PRACTITIONER

## 2024-05-21 PROCEDURE — 3044F HG A1C LEVEL LT 7.0%: CPT | Mod: CPTII,,, | Performed by: NURSE PRACTITIONER

## 2024-05-21 PROCEDURE — 3066F NEPHROPATHY DOC TX: CPT | Mod: CPTII,,, | Performed by: NURSE PRACTITIONER

## 2024-05-21 PROCEDURE — 3008F BODY MASS INDEX DOCD: CPT | Mod: CPTII,,, | Performed by: NURSE PRACTITIONER

## 2024-05-21 PROCEDURE — 3061F NEG MICROALBUMINURIA REV: CPT | Mod: CPTII,,, | Performed by: NURSE PRACTITIONER

## 2024-05-21 PROCEDURE — 99999 PR PBB SHADOW E&M-EST. PATIENT-LVL III: CPT | Mod: PBBFAC,,, | Performed by: NURSE PRACTITIONER

## 2024-05-21 PROCEDURE — 1159F MED LIST DOCD IN RCRD: CPT | Mod: CPTII,,, | Performed by: NURSE PRACTITIONER

## 2024-05-21 NOTE — PROGRESS NOTES
Subjective:       Patient ID: Lyndsey Gan is a 58 y.o. female.    Chief Complaint: Follow-up (6 months/)    Patient is known, to me and presents with   Chief Complaint   Patient presents with    Follow-up     6 months     .  Denies chest pain and shortness of breath.  Here for regular check up and labs. Doing well no complaints. Her mood is stable and no sihi noted  Follow-up  Pertinent negatives include no arthralgias, chest pain, congestion, coughing, fatigue, fever, headaches, joint swelling, neck pain, numbness or weakness.     Review of Systems   Constitutional:  Negative for activity change, appetite change, fatigue, fever and unexpected weight change.   HENT:  Negative for congestion, ear discharge, ear pain, hearing loss, postnasal drip and tinnitus.    Eyes:  Negative for photophobia, pain and visual disturbance.   Respiratory:  Negative for cough, shortness of breath, wheezing and stridor.    Cardiovascular:  Negative for chest pain, palpitations and leg swelling.   Gastrointestinal:  Negative for abdominal distention.   Genitourinary:  Negative for difficulty urinating, dysuria, frequency, hematuria and urgency.   Musculoskeletal:  Negative for arthralgias, back pain, gait problem, joint swelling and neck pain.   Skin: Negative.    Neurological:  Negative for dizziness, seizures, syncope, weakness, light-headedness, numbness and headaches.   Hematological:  Negative for adenopathy. Does not bruise/bleed easily.   Psychiatric/Behavioral:  Negative for behavioral problems, confusion, dysphoric mood, hallucinations, sleep disturbance and suicidal ideas. The patient is not nervous/anxious.        Objective:      Physical Exam  Constitutional:       General: She is not in acute distress.     Appearance: She is well-developed.   HENT:      Head: Normocephalic and atraumatic.      Right Ear: Tympanic membrane and external ear normal.      Left Ear: Tympanic membrane and external ear normal.      Nose: Nose  normal.      Mouth/Throat:      Mouth: Mucous membranes are moist.      Pharynx: No oropharyngeal exudate.   Eyes:      General: No scleral icterus.        Right eye: No discharge.         Left eye: No discharge.      Conjunctiva/sclera: Conjunctivae normal.      Pupils: Pupils are equal, round, and reactive to light.   Neck:      Thyroid: No thyromegaly.      Vascular: No JVD.   Cardiovascular:      Rate and Rhythm: Normal rate and regular rhythm.      Heart sounds: Normal heart sounds. No murmur heard.     No friction rub. No gallop.   Pulmonary:      Effort: Pulmonary effort is normal. No respiratory distress.      Breath sounds: Normal breath sounds. No stridor. No wheezing or rales.   Chest:      Chest wall: No tenderness.   Abdominal:      General: Bowel sounds are normal. There is no distension.      Palpations: Abdomen is soft. There is no mass.      Tenderness: There is no abdominal tenderness. There is no right CVA tenderness, left CVA tenderness, guarding or rebound.      Hernia: No hernia is present.   Musculoskeletal:         General: No swelling, tenderness, deformity or signs of injury. Normal range of motion.      Cervical back: Normal range of motion and neck supple.      Right lower leg: No edema.      Left lower leg: No edema.   Lymphadenopathy:      Cervical: No cervical adenopathy.   Skin:     General: Skin is warm and dry.      Capillary Refill: Capillary refill takes less than 2 seconds.      Coloration: Skin is not jaundiced or pale.      Findings: No bruising, erythema, lesion or rash.   Neurological:      General: No focal deficit present.      Mental Status: She is alert and oriented to person, place, and time.      Cranial Nerves: No cranial nerve deficit.      Sensory: No sensory deficit.      Motor: No weakness or abnormal muscle tone.      Coordination: Coordination normal.      Gait: Gait normal.      Deep Tendon Reflexes: Reflexes are normal and symmetric. Reflexes normal.    Psychiatric:         Attention and Perception: She does not perceive auditory or visual hallucinations.         Mood and Affect: Mood and affect normal. Mood is not anxious or depressed. Affect is not tearful.         Behavior: Behavior normal.         Thought Content: Thought content normal. Thought content does not include homicidal or suicidal ideation. Thought content does not include homicidal or suicidal plan.         Judgment: Judgment normal.         Assessment:       1. Benign essential HTN    2. Hyperlipidemia LDL goal <70    3. Prediabetes    4. Bipolar depression    5. BILL (generalized anxiety disorder)    6. Encounter for screening for malignant neoplasm of breast, unspecified screening modality        Plan:   1. Benign essential HTN  -     CBC Auto Differential; Future; Expected date: 11/17/2024  -     Comprehensive Metabolic Panel; Future; Expected date: 11/17/2024  -     Microalbumin/Creatinine Ratio, Urine; Future; Expected date: 11/17/2024    2. Hyperlipidemia LDL goal <70  -     CBC Auto Differential; Future; Expected date: 11/17/2024  -     Comprehensive Metabolic Panel; Future; Expected date: 11/17/2024  -     TSH; Future; Expected date: 11/17/2024  -     Lipid Panel; Future; Expected date: 11/17/2024    3. Prediabetes  -     CBC Auto Differential; Future; Expected date: 11/17/2024  -     Comprehensive Metabolic Panel; Future; Expected date: 11/17/2024  -     Microalbumin/Creatinine Ratio, Urine; Future; Expected date: 11/17/2024  -     Hemoglobin A1C; Future; Expected date: 11/17/2024    4. Bipolar depression  -     CBC Auto Differential; Future; Expected date: 11/17/2024  -     Comprehensive Metabolic Panel; Future; Expected date: 11/17/2024    5. BILL (generalized anxiety disorder)  -     CBC Auto Differential; Future; Expected date: 11/17/2024  -     Comprehensive Metabolic Panel; Future; Expected date: 11/17/2024    6. Encounter for screening for malignant neoplasm of breast, unspecified  "screening modality  -     Mammo Digital Screening Bilat w/ [facility]; Future; Expected date: 05/21/2024       "This note will not be shared with the patient."  Lab drawn today CBC, CMP, TSH, FLP  Limit the cholesterol in your diet to less than 300 mg per day.  Fats should contribute no more than 20 to 35% of your daily calories.  Less than 7 to 10% of your calories should come from saturated fat.  Avoid saturated fat products e.g., butter, some oils, meat, and poultry fat contain a lot of saturated fat.  Check food labels for fat and cholesterol content. Choose the foods with less fat per serving.  Limit the amount of butter and margarine you eat.  Use salad dressings and margarine made with polyunsaturated and monunsaturated fats.  Use egg whites or egg substitutes rather than whole eggs.  Replace whole-milk dairy products with nonfat or low-fat mild, cheese, spreads, and yogurt.  Eat skinless chicken, turkey, fish, and meatless entrees more often than red meat.  Choose lean cuts of meat and trim off all visible fat. Keep portion sizes moderate.  Avoid fatty desserts such as ice cream, cream-filled cakes, and cheesecakes. Choose fresh fruits, nonfat frozen yogurt, Popsicles, etc.  Reduce the amount of fried foods, vending machine food, and fast food you eat.  Eat fruits and vegetables (especially fresh fruits and leafy vegetables), beans, and whole grains daily. The fiber in these foods helps lower cholesterol.  Look for low-fat or nonfat varieties of the foods you like to eat or look for substitutes.  You may need to exercise 60 minutes a day to prevent weight gain and 90 minutes a day to lose weight.  RTC in six months.   "

## 2024-08-13 ENCOUNTER — HOSPITAL ENCOUNTER (OUTPATIENT)
Dept: RADIOLOGY | Facility: HOSPITAL | Age: 59
Discharge: HOME | End: 2024-08-13
Attending: NURSE PRACTITIONER
Payer: MEDICAID

## 2024-08-13 ENCOUNTER — OFFICE VISIT (OUTPATIENT)
Dept: OBSTETRICS AND GYNECOLOGY | Facility: CLINIC | Age: 59
End: 2024-08-13
Attending: NURSE PRACTITIONER
Payer: MEDICAID

## 2024-08-13 VITALS
OXYGEN SATURATION: 98 % | WEIGHT: 114.31 LBS | SYSTOLIC BLOOD PRESSURE: 112 MMHG | DIASTOLIC BLOOD PRESSURE: 70 MMHG | RESPIRATION RATE: 12 BRPM | BODY MASS INDEX: 21.58 KG/M2 | HEART RATE: 62 BPM | HEIGHT: 61 IN

## 2024-08-13 VITALS — BODY MASS INDEX: 21.14 KG/M2 | HEIGHT: 61 IN | WEIGHT: 112 LBS

## 2024-08-13 DIAGNOSIS — Z12.4 SCREENING FOR MALIGNANT NEOPLASM OF CERVIX: ICD-10-CM

## 2024-08-13 DIAGNOSIS — Z11.51 SCREENING FOR HUMAN PAPILLOMAVIRUS (HPV): Primary | ICD-10-CM

## 2024-08-13 DIAGNOSIS — Z12.39 ENCOUNTER FOR SCREENING FOR MALIGNANT NEOPLASM OF BREAST, UNSPECIFIED SCREENING MODALITY: ICD-10-CM

## 2024-08-13 PROCEDURE — 77067 SCR MAMMO BI INCL CAD: CPT | Mod: TC

## 2024-08-13 PROCEDURE — 99499 UNLISTED E&M SERVICE: CPT | Mod: S$PBB,,, | Performed by: OBSTETRICS & GYNECOLOGY

## 2024-08-13 PROCEDURE — 77067 SCR MAMMO BI INCL CAD: CPT | Mod: 26,,, | Performed by: RADIOLOGY

## 2024-08-13 PROCEDURE — 77063 BREAST TOMOSYNTHESIS BI: CPT | Mod: 26,,, | Performed by: RADIOLOGY

## 2024-08-16 ENCOUNTER — TELEPHONE (OUTPATIENT)
Dept: INTERNAL MEDICINE | Facility: CLINIC | Age: 59
End: 2024-08-16
Payer: MEDICAID

## 2024-08-16 DIAGNOSIS — R92.8 ABNORMAL MAMMOGRAM OF RIGHT BREAST: Primary | ICD-10-CM

## 2024-08-21 ENCOUNTER — HOSPITAL ENCOUNTER (OUTPATIENT)
Dept: RADIOLOGY | Facility: HOSPITAL | Age: 59
Discharge: HOME | End: 2024-08-21
Attending: NURSE PRACTITIONER
Payer: MEDICAID

## 2024-08-21 ENCOUNTER — OFFICE VISIT (OUTPATIENT)
Dept: OBSTETRICS AND GYNECOLOGY | Facility: CLINIC | Age: 59
End: 2024-08-21
Payer: MEDICAID

## 2024-08-21 VITALS
BODY MASS INDEX: 21.31 KG/M2 | OXYGEN SATURATION: 99 % | DIASTOLIC BLOOD PRESSURE: 80 MMHG | SYSTOLIC BLOOD PRESSURE: 122 MMHG | RESPIRATION RATE: 14 BRPM | HEIGHT: 61 IN | HEART RATE: 78 BPM | WEIGHT: 112.88 LBS

## 2024-08-21 DIAGNOSIS — Z12.11 COLON CANCER SCREENING: ICD-10-CM

## 2024-08-21 DIAGNOSIS — Z90.710 HISTORY OF HYSTERECTOMY: ICD-10-CM

## 2024-08-21 DIAGNOSIS — Z12.4 SCREENING FOR MALIGNANT NEOPLASM OF CERVIX: ICD-10-CM

## 2024-08-21 DIAGNOSIS — Z11.51 SCREENING FOR HUMAN PAPILLOMAVIRUS (HPV): ICD-10-CM

## 2024-08-21 DIAGNOSIS — R92.8 ABNORMAL MAMMOGRAM OF RIGHT BREAST: ICD-10-CM

## 2024-08-21 DIAGNOSIS — Z01.419 WELL WOMAN EXAM WITH ROUTINE GYNECOLOGICAL EXAM: Primary | ICD-10-CM

## 2024-08-21 DIAGNOSIS — Z12.31 BREAST CANCER SCREENING BY MAMMOGRAM: ICD-10-CM

## 2024-08-21 PROCEDURE — 3066F NEPHROPATHY DOC TX: CPT | Mod: CPTII,,, | Performed by: OBSTETRICS & GYNECOLOGY

## 2024-08-21 PROCEDURE — 3061F NEG MICROALBUMINURIA REV: CPT | Mod: CPTII,,, | Performed by: OBSTETRICS & GYNECOLOGY

## 2024-08-21 PROCEDURE — 1159F MED LIST DOCD IN RCRD: CPT | Mod: CPTII,,, | Performed by: OBSTETRICS & GYNECOLOGY

## 2024-08-21 PROCEDURE — 99396 PREV VISIT EST AGE 40-64: CPT | Mod: S$PBB,,, | Performed by: OBSTETRICS & GYNECOLOGY

## 2024-08-21 PROCEDURE — 3079F DIAST BP 80-89 MM HG: CPT | Mod: CPTII,,, | Performed by: OBSTETRICS & GYNECOLOGY

## 2024-08-21 PROCEDURE — 77061 BREAST TOMOSYNTHESIS UNI: CPT | Mod: TC,RT

## 2024-08-21 PROCEDURE — 3008F BODY MASS INDEX DOCD: CPT | Mod: CPTII,,, | Performed by: OBSTETRICS & GYNECOLOGY

## 2024-08-21 PROCEDURE — 3044F HG A1C LEVEL LT 7.0%: CPT | Mod: CPTII,,, | Performed by: OBSTETRICS & GYNECOLOGY

## 2024-08-21 PROCEDURE — 77065 DX MAMMO INCL CAD UNI: CPT | Mod: 26,RT,, | Performed by: RADIOLOGY

## 2024-08-21 PROCEDURE — 3074F SYST BP LT 130 MM HG: CPT | Mod: CPTII,,, | Performed by: OBSTETRICS & GYNECOLOGY

## 2024-08-21 PROCEDURE — 77061 BREAST TOMOSYNTHESIS UNI: CPT | Mod: 26,RT,, | Performed by: RADIOLOGY

## 2024-08-21 PROCEDURE — 99213 OFFICE O/P EST LOW 20 MIN: CPT | Mod: PBBFAC | Performed by: OBSTETRICS & GYNECOLOGY

## 2024-08-21 PROCEDURE — 99999 PR PBB SHADOW E&M-EST. PATIENT-LVL III: CPT | Mod: PBBFAC,,, | Performed by: OBSTETRICS & GYNECOLOGY

## 2024-08-21 NOTE — PROGRESS NOTES
"  Subjective:    Patient ID: Lyndsey Gan is a 59 y.o. y.o. female.     Chief Complaint: Annual Well Woman Exam     History of Present Illness:  Lyndsey presents today for Annual Well Woman exam. She describes her menses as  absent .She denies pelvic pain.  She denies breast tenderness, masses, nipple discharge. She denies difficulty with urination or bowel movements. She denies menopausal symptoms such as hotflashes, vaginal dryness, and night sweats. She denies bloating, early satiety, or weight changes. She is sexually active. Contraception is by no method.    The following portions of the patient's history were reviewed and updated as appropriate: allergies, current medications, past family history, past medical history, past social history, past surgical history and problem list.      Menstrual History:   No LMP recorded. Patient has had a hysterectomy..     OB History          1    Para   1    Term   1            AB        Living   1         SAB        IAB        Ectopic        Multiple        Live Births   1                 The following portions of the patient's history were reviewed and updated as appropriate: allergies, current medications, past family history, past medical history, past social history, past surgical history, and problem list.        ROS:     Review of Systems    Objective:    Vital Signs:  Vitals:    24 1232   BP: 122/80   BP Location: Left arm   Patient Position: Sitting   BP Method: Medium (Manual)   Pulse: 78   Resp: 14   SpO2: 99%   Weight: 51.2 kg (112 lb 14 oz)   Height: 5' 1" (1.549 m)         Physical Exam:  General:  alert, cooperative, appears stated age   Skin:  Skin color, texture, turgor normal. No rashes or lesions   HEENT:  conjunctivae/corneas clear. PERRL.   Neck: supple, trachea midline, no adenopathy or thyromegally   Respiratory:  clear to auscultation bilaterally   Heart:  regular rate and rhythm, S1, S2 normal, no murmur, click, rub or gallop "   Breasts:  no discharge, erythema, or tenderness   Abdomen:  normal findings: bowel sounds normal, no masses palpable, no organomegaly and soft, non-tender   Pelvis: External genitalia: normal general appearance  Urinary system: urethral meatus normal, bladder nontender  Vaginal: atrophic mucosa  Cervix: normal appearance  Uterus: normal size, shape, position  Adnexa: normal size, nontender bilaterally   Extremities: Normal ROM; no edema, no cyanosis   Neurologial: Normal strength and tone. No focal numbness or weakness. Reflexes 2+ and equal.   Psychiatric: normal mood, speech, dress, and thought processes         Assessment:       Healthy female exam.     1. Well woman exam with routine gynecological exam    2. Breast cancer screening by mammogram    3. History of hysterectomy    4. Colon cancer screening          Plan:       Thin prep Pap smear.  HPV cotesting  MMG UTD  Cologuard ordered  DEXA at 65  Discussed weight bearing exercise, calcium, and vitamin d for osteoporosis prevention  RTC in 1 year or prn     COUNSELING:  Lyndsey was counseled on STD pevention, use and side-effects of various contraceptive measures, A.C.O.G. Pap guidelines and recommendations for yearly pelvic exams in addition to recommendations for monthly self breast exams; to see her PCP for other health maintenance.

## 2024-08-30 ENCOUNTER — TELEPHONE (OUTPATIENT)
Dept: OBSTETRICS AND GYNECOLOGY | Facility: CLINIC | Age: 59
End: 2024-08-30
Payer: MEDICAID

## 2024-08-30 NOTE — TELEPHONE ENCOUNTER
Pt called stating that Dr. Bauer told her to bring her Cologard to [facility] but the instructions she received from the company is telling her [employer], I informed the pt that sometimes the company may change shipping company's, so she needs to follow the letter's instructions, pt v/u.

## 2024-08-30 NOTE — TELEPHONE ENCOUNTER
----- Message from Flavia Shook MA sent at 8/30/2024  1:31 PM CDT -----  Contact: padma Gan  MRN: [id number]  Home Phone      Not on file.  Work Phone      Not on file.  Mobile          [phone]    Patient Care Team:  Dorothy Wallace NP as PCP - General (Family Medicine)  Ya Escamilla LPN (Inactive) as Care Coordinator  Puma Bailey MD as Consulting Physician (Obstetrics and Gynecology)  Kassandra Stephenson MD as Consulting Physician (Obstetrics and Gynecology)  OB? No  What phone number can you be reached at? [phone]  Message: Has questions regarding cologuard test / kit.

## 2024-08-31 DIAGNOSIS — F41.1 GAD (GENERALIZED ANXIETY DISORDER): ICD-10-CM

## 2024-08-31 DIAGNOSIS — F31.9 BIPOLAR DEPRESSION: ICD-10-CM

## 2024-09-03 RX ORDER — ALPRAZOLAM 1 MG/1
TABLET ORAL
Qty: 90 TABLET | Refills: 2 | Status: SHIPPED | OUTPATIENT
Start: 2024-09-03

## 2024-10-01 DIAGNOSIS — F41.1 GAD (GENERALIZED ANXIETY DISORDER): ICD-10-CM

## 2024-10-01 DIAGNOSIS — F31.9 BIPOLAR DEPRESSION: ICD-10-CM

## 2024-10-01 DIAGNOSIS — R06.02 SOB (SHORTNESS OF BREATH): ICD-10-CM

## 2024-10-01 RX ORDER — ARIPIPRAZOLE 10 MG/1
10 TABLET ORAL
Qty: 90 TABLET | Refills: 1 | Status: SHIPPED | OUTPATIENT
Start: 2024-10-01

## 2024-10-01 RX ORDER — ALBUTEROL SULFATE 90 UG/1
INHALANT RESPIRATORY (INHALATION)
Qty: 18 G | Refills: 5 | Status: SHIPPED | OUTPATIENT
Start: 2024-10-01

## 2024-10-04 ENCOUNTER — TELEPHONE (OUTPATIENT)
Dept: OBSTETRICS AND GYNECOLOGY | Facility: CLINIC | Age: 59
End: 2024-10-04
Payer: MEDICAID

## 2024-10-04 DIAGNOSIS — R19.5 POSITIVE COLORECTAL CANCER SCREENING USING COLOGUARD TEST: ICD-10-CM

## 2024-10-04 DIAGNOSIS — Z12.11 COLON CANCER SCREENING: Primary | ICD-10-CM

## 2024-10-04 NOTE — TELEPHONE ENCOUNTER
----- Message from Chanell sent at 10/4/2024  2:33 PM CDT -----  Contact: Self  Lyndsey Gan  MRN: [id number]  Home Phone      Not on file.  Work Phone      Not on file.  Mobile          [phone]    Patient Care Team:  Dorothy Wallace NP as PCP - General (Family Medicine)  Ya Escamilla LPN (Inactive) as Care Coordinator  Puma Bailey MD as Consulting Physician (Obstetrics and Gynecology)  Kassandra Stephenson MD as Consulting Physician (Obstetrics and Gynecology)  OB? No  What phone number can you be reached at? [phone]  Message: pt is calling in regards to colonoscopy

## 2024-10-23 ENCOUNTER — HOSPITAL ENCOUNTER (OUTPATIENT)
Dept: PREADMISSION TESTING | Facility: HOSPITAL | Age: 59
Discharge: HOME | End: 2024-10-23
Attending: STUDENT IN AN ORGANIZED HEALTH CARE EDUCATION/TRAINING PROGRAM
Payer: MEDICAID

## 2024-10-23 NOTE — DISCHARGE INSTRUCTIONS
Colonoscopy Prep Instructions      Date: Thursday   Arrival time:       IMPORTANT: PLEASE READ YOUR INSTRUCTIONS CAREFULLY. FAILURE TO FOLLOW THESE INSTRUCTIONS MAY RESULT IN YOUR PROCEURE BEING             CANCELED,RESCHEDULED, OR REPEATED.            Clear Liquid Diet     The Wednesday before your procedure you will follow a clear liquid diet ALL day.     You may have any of the following:     Water, tea, coffee (decaffeinated or regular)     Soft drinks (regular or sugar free)     Gelatin dessert (plain or flavored)     Juice, Gatorade, Powerade, Crystal Lite, lemonade, limeade, Conrad-Aid     Bouillon, clear consommé, 100% fat free beef, chicken, or vegetable broths     Snowballs, popsicles     100% cranberry juice is the only red liquid allowed     Please Avoid the following:     Anything with RED dye     Liquids not specifically listed     Dairy (liquid and powder)     Creamers (liquid and powder)     Alcohol               CLENPIQ Instructions    PLEASE FOLLOW THESE INSTRUCTIONS NOT THE INSTRUCTIONS ON THE BOX    You are scheduled for a colonoscopy with Dr. Yates on 11/724 at [facility].  To ensure that your test is accurate and complete, you MUST follow these instructions listed below.  If you have any questions, please call our office at [phone].  Plan on being at the hospital for your procedure for 3-4 hours. Please contact the office at [phone] two days prior to procedure date if reschedule is needed.    Follow a CLEAR LIQUID DIET for the entire day before your scheduled colonoscopy.  This means no solid food the entire day starting when you wake.  You may have as much of the clear liquids as you want throughout the day.    ON WEDNESDAY, HOLD THE FOLLOWING MEDICATIONS: ________________________________________    1.  At 10:00 am, take 4 Dulcolax Laxatives with 16 ounces of water.  Typically, it can take 2-3 hours for the effects of this medication to start.    2.  AT 12 pm/noon, before your  None colonoscopy, OPEN ONE (1) BOTTLE OF CLENPIQ AND DRINK THE ENTIRE BOTTLE.  DRINK FIVE (5) 8-OUNCE GLASSES OF WATER (40 OUNCES TOTAL) OVER THE NEXT FIVE (5) HOURS.     3.  At  6:30 pm OPEN THE OTHER ONE (1) BOTTLE OF CLENPIQ AND DRINK THE ENTIRE BOTTLE.  DRINK THREE (3) 8-OUNCE GLASSES OF WATER (24 OUNCES TOTAL) OVER THE NEXT THREE (3) HOURS.    When your prep is complete, you can continue to have clear liquids through the night until 7:30 am.    Before arrival, with a sip of water, take the following medications: __________________________________________.    4.  You must have someone with you to DRIVE YOU HOME since you will be receiving IV sedation for the colonoscopy.

## 2024-11-07 ENCOUNTER — HOSPITAL ENCOUNTER (OUTPATIENT)
Dept: ENDOSCOPY | Facility: HOSPITAL | Age: 59
Discharge: HOME | End: 2024-11-07
Attending: OBSTETRICS & GYNECOLOGY | Admitting: STUDENT IN AN ORGANIZED HEALTH CARE EDUCATION/TRAINING PROGRAM
Payer: MEDICAID

## 2024-11-07 ENCOUNTER — ANESTHESIA (OUTPATIENT)
Dept: ENDOSCOPY | Facility: HOSPITAL | Age: 59
End: 2024-11-07
Payer: MEDICAID

## 2024-11-07 ENCOUNTER — ANESTHESIA EVENT (OUTPATIENT)
Dept: ENDOSCOPY | Facility: HOSPITAL | Age: 59
End: 2024-11-07
Payer: MEDICAID

## 2024-11-07 VITALS
RESPIRATION RATE: 18 BRPM | HEART RATE: 59 BPM | OXYGEN SATURATION: 100 % | DIASTOLIC BLOOD PRESSURE: 62 MMHG | SYSTOLIC BLOOD PRESSURE: 101 MMHG | TEMPERATURE: 98 F

## 2024-11-07 DIAGNOSIS — Z12.11 COLON CANCER SCREENING: ICD-10-CM

## 2024-11-07 DIAGNOSIS — R19.5 POSITIVE COLORECTAL CANCER SCREENING USING COLOGUARD TEST: ICD-10-CM

## 2024-11-07 PROCEDURE — 37000008 HC ANESTHESIA 1ST 15 MINUTES

## 2024-11-07 PROCEDURE — 37000009 HC ANESTHESIA EA ADD 15 MINS

## 2024-11-07 PROCEDURE — 63600175 PHARM REV CODE 636 W HCPCS: Performed by: NURSE ANESTHETIST, CERTIFIED REGISTERED

## 2024-11-07 RX ORDER — LIDOCAINE HYDROCHLORIDE 20 MG/ML
INJECTION, SOLUTION EPIDURAL; INFILTRATION; INTRACAUDAL; PERINEURAL
Status: DISCONTINUED | OUTPATIENT
Start: 2024-11-07 | End: 2024-11-07

## 2024-11-07 RX ORDER — MIDAZOLAM HYDROCHLORIDE 1 MG/ML
INJECTION INTRAMUSCULAR; INTRAVENOUS
Status: DISCONTINUED | OUTPATIENT
Start: 2024-11-07 | End: 2024-11-07

## 2024-11-07 RX ORDER — PROPOFOL 10 MG/ML
VIAL (ML) INTRAVENOUS
Status: DISCONTINUED | OUTPATIENT
Start: 2024-11-07 | End: 2024-11-07

## 2024-11-07 RX ADMIN — PROPOFOL 100 MG: 10 INJECTION, EMULSION INTRAVENOUS at 10:11

## 2024-11-07 RX ADMIN — MIDAZOLAM HYDROCHLORIDE 2 MG: 1 INJECTION INTRAMUSCULAR; INTRAVENOUS at 10:11

## 2024-11-07 RX ADMIN — LIDOCAINE HYDROCHLORIDE 75 MG: 20 INJECTION, SOLUTION EPIDURAL; INFILTRATION; INTRACAUDAL; PERINEURAL at 10:11

## 2024-11-07 RX ADMIN — PROPOFOL 50 MG: 10 INJECTION, EMULSION INTRAVENOUS at 10:11

## 2024-11-07 RX ADMIN — PROPOFOL 150 MG: 10 INJECTION, EMULSION INTRAVENOUS at 10:11

## 2024-11-07 NOTE — ANESTHESIA POSTPROCEDURE EVALUATION
Anesthesia Post Evaluation    Patient: Lyndsey Wisdomisance    Procedure(s) Performed: * No procedures listed *    Final Anesthesia Type: general      Patient location during evaluation: PACU  Patient participation: Yes- Able to Participate  Level of consciousness: awake and alert, oriented and awake  Post-procedure vital signs: reviewed and stable  Pain management: adequate  Airway patency: patent    PONV status at discharge: No PONV  Anesthetic complications: no      Cardiovascular status: blood pressure returned to baseline  Respiratory status: unassisted, spontaneous ventilation and room air  Hydration status: euvolemic  Follow-up not needed.  Comments: [facility]              Vitals Value Taken Time   /62 11/07/24 1113   Temp 36.4 °C (97.6 °F) 11/07/24 1053   Pulse 59 11/07/24 1113   Resp 18 11/07/24 1113   SpO2 100 % 11/07/24 1113         Event Time   Out of Recovery 11:40:00         Pain/Nicole Score: Nicole Score: 10 (11/7/2024 11:13 AM)

## 2024-11-07 NOTE — TRANSFER OF CARE
Anesthesia Transfer of Care Note    Patient: Lyndsey Gan    Procedure(s) Performed: * No procedures listed *    Patient location: PACU    Anesthesia Type: general    Transport from OR: Transported from OR on 6-10 L/min O2 by face mask with adequate spontaneous ventilation    Post pain: adequate analgesia    Post assessment: no apparent anesthetic complications and tolerated procedure well    Post vital signs: stable    Level of consciousness: sedated    Nausea/Vomiting: no nausea/vomiting    Complications: none    Transfer of care protocol was followed      Last vitals: Visit Vitals  BP (!) 144/75 (BP Location: Left arm, Patient Position: Lying)   Pulse 75   Temp 36.3 °C (97.4 °F) (Skin)   Resp 16   SpO2 99%   Breastfeeding No

## 2024-11-07 NOTE — DISCHARGE SUMMARY
Brief Operative Note    Date of surgery: 11/07/2024    Pre-operative Diagnosis:  * No pre-op diagnosis entered *     Post-operative Diagnosis:   Same as above    Surgeon: Ej Yates M.D.    Procedure:  Colonoscopy     Anesthesia: Monitored anesthesia care     Findings:  See operative note    Estimated blood loss: Minimal         Specimens:   See operative note    Discharge Note    Outcome:   Patient tolerated treatment/procedure well without complication and is now ready for discharge.    Disposition:   Home or Self Care    Final diagnosis:    * No pre-op diagnosis entered *    Follow-up:   With primary care provider    Discharge Procedure Orders   Diet Adult Regular     No dressing needed     Notify your health care provider if you experience any of the following:  temperature >100.4     Notify your health care provider if you experience any of the following:  persistent nausea and vomiting or diarrhea     Notify your health care provider if you experience any of the following:  severe uncontrolled pain     Notify your health care provider if you experience any of the following:  redness, tenderness, or signs of infection (pain, swelling, redness, odor or green/yellow discharge around incision site)     Notify your health care provider if you experience any of the following:  difficulty breathing or increased cough     Notify your health care provider if you experience any of the following:  severe persistent headache     Notify your health care provider if you experience any of the following:  worsening rash     Notify your health care provider if you experience any of the following:  persistent dizziness, light-headedness, or visual disturbances     Notify your health care provider if you experience any of the following:  increased confusion or weakness     Activity as tolerated          Ej Yates MD

## 2024-11-07 NOTE — H&P
[facility]  Colon and Rectal Surgery    [address]  [place], [place]  Tel: [phone]  Fax: [phone]  [email or web address]   MD Madi Galo MD Brian Kann, MD W. Forrest Johnston, MD Matthew Giglia, MD Jennifer Paruch, MD William Kethman, MD Danielle Kay, MD     Patient name: Lyndsey Gan   YOB: 1965   MRN: [id number]  Date of procedure: 11/07/2024    Procedure: Colonoscopy  Indications: Family history of colorectal cancer and Screening for colon cancer - Positive Cologuard    The patient was informed of the availability of a certified  without charge. A certified  was not necessary for this visit.    Sedation plan: MAC  ASA: ASA 2 - Patient with mild systemic disease with no functional limitations    Review of Systems  See above    Past Medical History:   Diagnosis Date    Abnormal Pap smear of cervix     at 20, had colpo done. nl since    Anxiety     Asthmatic bronchitis without complication 11/13/2015    Bipolar affective     Chronic cough     Hypertension     Mass     Left Index Finger  -DIP joint     Past Surgical History:   Procedure Laterality Date    HYSTERECTOMY  01/01/2007    supracervical    left Pointer finger       TONSILLECTOMY  01/01/1977     Family History   Problem Relation Name Age of Onset    Cancer Paternal Grandfather          not sure if lung or colon cancer    Breast cancer Maternal Grandmother      Heart disease Father      Hypertension Father      Heart disease Mother      Depression Mother      Breast cancer Paternal Aunt  70        had mastectomy    Colon cancer Neg Hx      Ovarian cancer Neg Hx       Social History     Tobacco Use    Smoking status: Every Day     Current packs/day: 0.25     Average packs/day: 0.3 packs/day for 12.7 years (3.2 ttl pk-yrs)     Types: Cigarettes     Start date: 8/6/2007     Last attempt to quit: 6/26/2019    Smokeless tobacco: Never    Tobacco  comments:     told about smoking cessation brochures and programs   Substance Use Topics    Alcohol use: Yes     Alcohol/week: 3.0 standard drinks of alcohol     Types: 3 Cans of beer per week     Comment: rarely    Drug use: No     Review of patient's allergies indicates:  No Known Allergies    Prior to Admission medications    Medication Sig Start Date End Date Taking? Authorizing Provider   albuterol (PROVENTIL/VENTOLIN HFA) 90 mcg/actuation inhaler INHALE 2 PUFFS BY MOUTH INTO THE LUNGS EVERY SIX HOURS AS NEEDED FOR WHEEZING. 10/1/24  Yes Dorothy Wallace NP   ALPRAZolam (XANAX) 1 MG tablet TAKE 1 TABLET BY MOUTH THREE TIMES A DAY AS NEEDED 9/3/24  Yes Dorothy Wallace NP   ARIPiprazole (ABILIFY) 10 MG Tab TAKE 1 TABLET BY MOUTH EVERY DAY 10/1/24  Yes Dorothy Wallace NP   rosuvastatin (CRESTOR) 5 MG tablet Take 1 tablet (5 mg total) by mouth every evening. 3/4/24  Yes Dorothy Wallace NP   traZODone (DESYREL) 100 MG tablet TAKE 2 TABLETS (200 MG TOTAL) BY MOUTH EVERY EVENING. 5/13/24 5/13/25 Yes Dorothy Wallace NP   bisacodyL (DULCOLAX) 5 mg EC tablet Take 4 tablets (20 mg total) by mouth once. for 1 dose 11/6/24 11/6/24  Ej Yates MD   promethazine (PHENERGAN) 25 MG tablet Take 1 tablet (25 mg total) by mouth every 6 (six) hours as needed for Nausea. 10/28/22   Dorothy Wallace NP   sod picosulf-mag ox-citric ac (CLENPIQ) 10 mg-3.5 gram- 12 gram/175 mL Soln Take 2 Bottles by mouth once. for 1 dose 11/6/24 11/6/24  Ej Yates MD       Physical Examination  BP (!) 144/75 (BP Location: Left arm, Patient Position: Lying)   Pulse 75   Temp 97.4 °F (36.3 °C) (Skin)   Resp 16   SpO2 99%   Breastfeeding No      Constitutional: well developed, no cough, no dyspnea, alert, and no acute distress    Head: Normocephalic, no lesions, without obvious abnormality  Eye: Normal external eye, conjunctiva, and lids, PERRL  Cardiovascular: regular rate and regular  rhythm  Respiratory: normal air entry  Gastrointestinal: soft, non-tender, without masses or organomegaly  Neurologic: alert, oriented, normal speech, no focal findings or movement disorder noted  Psychiatric: appropriate, normal mood    Plan of Care    It was a pleasure meeting Ms. Gan today - we will plan to perform a colonoscopy with monitored anesthesia care. The details of the procedure, the possible need for biopsy or polypectomy and the potential risks including bleeding, perforation, missed polyps were discussed in detail and they consented to undergo the procedure.      Ej Yates MD, FACS, FASCRS  Department of Colon & Rectal Surgery  [facility]

## 2024-11-07 NOTE — ANESTHESIA PREPROCEDURE EVALUATION
11/07/2024  Lyndsey Gan is a 59 y.o., female.    Anesthesia Evaluation    I have reviewed the Patient Summary Reports.     I have reviewed the Nursing Notes. I have reviewed the NPO Status.   I have reviewed the Medications.     Review of Systems  Anesthesia Hx:  No problems with previous Anesthesia                Hematology/Oncology:  Hematology Normal   Oncology Normal                                   EENT/Dental:  EENT/Dental Normal           Cardiovascular:     Hypertension, well controlled                                          Pulmonary:    Asthma  Shortness of breath                  Hepatic/GI:  Hepatic/GI Normal                    Neurological:  Neurology Normal                                      Endocrine:  Endocrine Normal            Psych:  Psychiatric History anxiety                 Physical Exam  General:  Well nourished       Airway/Jaw/Neck:   Mouth Opening: Normal     Tongue: Normal           Mallampati: II   TM Distance: Normal       Neck ROM: Normal ROM           Chest/Lungs:  Chest/Lungs Findings:  Clear to auscultation       Heart/Vascular:  Heart Findings: Rate: Normal  Rhythm: Regular Rhythm  Sounds: Normal                            Anesthesia Plan  Type of Anesthesia, risks & benefits discussed:  Anesthesia Type:  MAC    Patient's Preference:   Plan Factors:          Intra-op Monitoring Plan: Standard ASA Monitors  Intra-op Monitoring Plan Comments:   Post Op Pain Control Plan: multimodal analgesia  Post Op Pain Control Plan Comments:     Induction:   IV  Beta Blocker:         Informed Consent: Informed consent signed with the Patient and all parties understand the risks and agree with anesthesia plan.  All questions answered.  Anesthesia consent signed with patient.  ASA Score: 2     Day of Surgery Review of History & Physical: I have interviewed and examined the patient.  I have reviewed the patient's H&P dated: 11/7/24.  There are no significant changes.  H&P Update referred to the surgeon/provider.          Ready For Surgery From Anesthesia Perspective.             Physical Exam  General: Well nourished    Airway:  Mallampati: II   Mouth Opening: Normal  TM Distance: Normal  Tongue: Normal  Neck ROM: Normal ROM    Chest/Lungs:  Clear to auscultation    Heart:  Rate: Normal  Rhythm: Regular Rhythm  Sounds: Normal        Anesthesia Plan  Type of Anesthesia, risks & benefits discussed:    Anesthesia Type: MAC  Intra-op Monitoring Plan: Standard ASA Monitors  Post Op Pain Control Plan: multimodal analgesia  Induction:  IV  Airway Plan: Direct  Informed Consent: Informed consent signed with the Patient and all parties understand the risks and agree with anesthesia plan.  All questions answered.   ASA Score: 2  Day of Surgery Review of History & Physical: H&P Update referred to the surgeon/provider.I have interviewed and examined the patient. I have reviewed the patient's H&P dated: 11/7/24. There are no significant changes.     Ready For Surgery From Anesthesia Perspective.     .

## 2024-11-22 ENCOUNTER — CLINICAL SUPPORT (OUTPATIENT)
Dept: INTERNAL MEDICINE | Facility: CLINIC | Age: 59
End: 2024-11-22
Payer: MEDICAID

## 2024-11-22 DIAGNOSIS — F41.1 GAD (GENERALIZED ANXIETY DISORDER): ICD-10-CM

## 2024-11-22 DIAGNOSIS — F31.9 BIPOLAR DEPRESSION: ICD-10-CM

## 2024-11-22 DIAGNOSIS — E78.5 HYPERLIPIDEMIA LDL GOAL <70: ICD-10-CM

## 2024-11-22 DIAGNOSIS — I10 BENIGN ESSENTIAL HTN: ICD-10-CM

## 2024-11-22 DIAGNOSIS — R73.03 PREDIABETES: ICD-10-CM

## 2024-11-22 LAB
ALBUMIN SERPL BCP-MCNC: 4.4 G/DL (ref 3.5–5.2)
ALBUMIN/CREAT UR: 9.6 UG/MG (ref 0–30)
ALP SERPL-CCNC: 51 U/L (ref 40–150)
ALT SERPL W/O P-5'-P-CCNC: 15 U/L (ref 10–44)
ANION GAP SERPL CALC-SCNC: 11 MMOL/L (ref 8–16)
AST SERPL-CCNC: 21 U/L (ref 10–40)
BASOPHILS # BLD AUTO: 0.03 K/UL (ref 0–0.2)
BASOPHILS NFR BLD: 0.6 % (ref 0–1.9)
BILIRUB SERPL-MCNC: 0.9 MG/DL (ref 0.1–1)
BUN SERPL-MCNC: 8 MG/DL (ref 6–20)
CALCIUM SERPL-MCNC: 9.8 MG/DL (ref 8.7–10.5)
CHLORIDE SERPL-SCNC: 105 MMOL/L (ref 95–110)
CHOLEST SERPL-MCNC: 143 MG/DL (ref 120–199)
CHOLEST/HDLC SERPL: 2 {RATIO} (ref 2–5)
CO2 SERPL-SCNC: 24 MMOL/L (ref 23–29)
CREAT SERPL-MCNC: 0.8 MG/DL (ref 0.5–1.4)
CREAT UR-MCNC: 114 MG/DL (ref 15–325)
DIFFERENTIAL METHOD BLD: ABNORMAL
EOSINOPHIL # BLD AUTO: 0.1 K/UL (ref 0–0.5)
EOSINOPHIL NFR BLD: 1.7 % (ref 0–8)
ERYTHROCYTE [DISTWIDTH] IN BLOOD BY AUTOMATED COUNT: 11.6 % (ref 11.5–14.5)
EST. GFR  (NO RACE VARIABLE): >60 ML/MIN/1.73 M^2
ESTIMATED AVG GLUCOSE: 82 MG/DL (ref 68–131)
GLUCOSE SERPL-MCNC: 82 MG/DL (ref 70–110)
HBA1C MFR BLD: 4.5 % (ref 4–5.6)
HCT VFR BLD AUTO: 43 % (ref 37–48.5)
HDLC SERPL-MCNC: 70 MG/DL (ref 40–75)
HDLC SERPL: 49 % (ref 20–50)
HGB BLD-MCNC: 14.1 G/DL (ref 12–16)
IMM GRANULOCYTES # BLD AUTO: 0.01 K/UL (ref 0–0.04)
IMM GRANULOCYTES NFR BLD AUTO: 0.2 % (ref 0–0.5)
LDLC SERPL CALC-MCNC: 51 MG/DL (ref 63–159)
LYMPHOCYTES # BLD AUTO: 1.8 K/UL (ref 1–4.8)
LYMPHOCYTES NFR BLD: 32.2 % (ref 18–48)
MCH RBC QN AUTO: 32.8 PG (ref 27–31)
MCHC RBC AUTO-ENTMCNC: 32.8 G/DL (ref 32–36)
MCV RBC AUTO: 100 FL (ref 82–98)
MICROALBUMIN UR DL<=1MG/L-MCNC: 11 UG/ML
MONOCYTES # BLD AUTO: 0.5 K/UL (ref 0.3–1)
MONOCYTES NFR BLD: 8.8 % (ref 4–15)
NEUTROPHILS # BLD AUTO: 3.1 K/UL (ref 1.8–7.7)
NEUTROPHILS NFR BLD: 56.5 % (ref 38–73)
NONHDLC SERPL-MCNC: 73 MG/DL
NRBC BLD-RTO: 0 /100 WBC
PLATELET # BLD AUTO: 152 K/UL (ref 150–450)
PMV BLD AUTO: 11.8 FL (ref 9.2–12.9)
POTASSIUM SERPL-SCNC: 4 MMOL/L (ref 3.5–5.1)
PROT SERPL-MCNC: 6.8 G/DL (ref 6–8.4)
RBC # BLD AUTO: 4.3 M/UL (ref 4–5.4)
SODIUM SERPL-SCNC: 140 MMOL/L (ref 136–145)
TRIGL SERPL-MCNC: 110 MG/DL (ref 30–150)
TSH SERPL DL<=0.005 MIU/L-ACNC: 2.48 UIU/ML (ref 0.4–4)
WBC # BLD AUTO: 5.44 K/UL (ref 3.9–12.7)

## 2024-11-22 PROCEDURE — 80053 COMPREHEN METABOLIC PANEL: CPT | Performed by: NURSE PRACTITIONER

## 2024-11-22 PROCEDURE — 82043 UR ALBUMIN QUANTITATIVE: CPT | Performed by: NURSE PRACTITIONER

## 2024-11-22 PROCEDURE — 80061 LIPID PANEL: CPT | Performed by: NURSE PRACTITIONER

## 2024-11-22 PROCEDURE — 85025 COMPLETE CBC W/AUTO DIFF WBC: CPT | Performed by: NURSE PRACTITIONER

## 2024-11-22 PROCEDURE — 83036 HEMOGLOBIN GLYCOSYLATED A1C: CPT | Performed by: NURSE PRACTITIONER

## 2024-11-22 PROCEDURE — 84443 ASSAY THYROID STIM HORMONE: CPT | Performed by: NURSE PRACTITIONER

## 2024-11-23 DIAGNOSIS — F51.01 PRIMARY INSOMNIA: ICD-10-CM

## 2024-11-25 RX ORDER — TRAZODONE HYDROCHLORIDE 100 MG/1
200 TABLET ORAL NIGHTLY
Qty: 180 TABLET | Refills: 1 | Status: SHIPPED | OUTPATIENT
Start: 2024-11-25 | End: 2025-11-25

## 2024-11-26 ENCOUNTER — OFFICE VISIT (OUTPATIENT)
Dept: INTERNAL MEDICINE | Facility: CLINIC | Age: 59
End: 2024-11-26
Payer: MEDICAID

## 2024-11-26 VITALS
HEIGHT: 61 IN | DIASTOLIC BLOOD PRESSURE: 78 MMHG | BODY MASS INDEX: 20.35 KG/M2 | HEART RATE: 95 BPM | WEIGHT: 107.81 LBS | OXYGEN SATURATION: 95 % | SYSTOLIC BLOOD PRESSURE: 134 MMHG | RESPIRATION RATE: 18 BRPM

## 2024-11-26 DIAGNOSIS — R06.02 SOB (SHORTNESS OF BREATH): ICD-10-CM

## 2024-11-26 DIAGNOSIS — F41.1 GAD (GENERALIZED ANXIETY DISORDER): ICD-10-CM

## 2024-11-26 DIAGNOSIS — F31.9 BIPOLAR DEPRESSION: ICD-10-CM

## 2024-11-26 DIAGNOSIS — E78.5 HYPERLIPIDEMIA LDL GOAL <70: ICD-10-CM

## 2024-11-26 DIAGNOSIS — I10 BENIGN ESSENTIAL HTN: Primary | ICD-10-CM

## 2024-11-26 DIAGNOSIS — R73.03 PREDIABETES: ICD-10-CM

## 2024-11-26 PROCEDURE — 3075F SYST BP GE 130 - 139MM HG: CPT | Mod: CPTII,,, | Performed by: NURSE PRACTITIONER

## 2024-11-26 PROCEDURE — 3078F DIAST BP <80 MM HG: CPT | Mod: CPTII,,, | Performed by: NURSE PRACTITIONER

## 2024-11-26 PROCEDURE — 3044F HG A1C LEVEL LT 7.0%: CPT | Mod: CPTII,,, | Performed by: NURSE PRACTITIONER

## 2024-11-26 PROCEDURE — 3066F NEPHROPATHY DOC TX: CPT | Mod: CPTII,,, | Performed by: NURSE PRACTITIONER

## 2024-11-26 PROCEDURE — 3008F BODY MASS INDEX DOCD: CPT | Mod: CPTII,,, | Performed by: NURSE PRACTITIONER

## 2024-11-26 PROCEDURE — 99213 OFFICE O/P EST LOW 20 MIN: CPT | Mod: PBBFAC,PN | Performed by: NURSE PRACTITIONER

## 2024-11-26 PROCEDURE — 3061F NEG MICROALBUMINURIA REV: CPT | Mod: CPTII,,, | Performed by: NURSE PRACTITIONER

## 2024-11-26 PROCEDURE — 99214 OFFICE O/P EST MOD 30 MIN: CPT | Mod: S$PBB,,, | Performed by: NURSE PRACTITIONER

## 2024-11-26 PROCEDURE — 1159F MED LIST DOCD IN RCRD: CPT | Mod: CPTII,,, | Performed by: NURSE PRACTITIONER

## 2024-11-26 PROCEDURE — 99999 PR PBB SHADOW E&M-EST. PATIENT-LVL III: CPT | Mod: PBBFAC,,, | Performed by: NURSE PRACTITIONER

## 2024-11-26 RX ORDER — ALPRAZOLAM 1 MG/1
TABLET ORAL
Qty: 90 TABLET | Refills: 5 | Status: SHIPPED | OUTPATIENT
Start: 2024-11-26

## 2024-11-26 RX ORDER — ROSUVASTATIN CALCIUM 5 MG/1
5 TABLET, COATED ORAL NIGHTLY
Qty: 90 TABLET | Refills: 1 | Status: SHIPPED | OUTPATIENT
Start: 2024-11-26

## 2024-11-26 RX ORDER — ALBUTEROL SULFATE 90 UG/1
2 INHALANT RESPIRATORY (INHALATION) EVERY 6 HOURS PRN
Qty: 18 G | Refills: 5 | Status: SHIPPED | OUTPATIENT
Start: 2024-11-26

## 2024-11-26 NOTE — PROGRESS NOTES
"History of Present Illness    CHIEF COMPLAINT:  Patient presents today for a follow-up and to review labs results.    LAB RESULTS:  She expresses satisfaction upon learning that her cholesterol levels are excellent. Her overall labs results show no concerning findings.    WEIGHT CHANGES:  She reports unintentional weight loss, noting a reduction in breast size from a double D to a C cup.    ANXIETY:  She experiences high levels of anxiety. She describes an episode of increased heart rate and feeling like her heart was "beating out of her chest" earlier in the morning. She expresses concern about abruptly stopping her anxiety medications, acknowledging the potential risks involved.    MEDICATIONS:  Current medications include Trazodone, Abilify, Xanax, and Albuterol inhaler. She needs refills for Xanax and Albuterol inhaler. She reports Xanax shortages for the past nine months, leading to occasional interruptions in her medication regimen for up to two days at a time. She emphasizes the challenges of abruptly stopping Xanax and the difficulty of managing without it due to high anxiety levels.      ROS:  General: -fever, -chills, -fatigue, -weight gain, +weight loss  Eyes: -vision changes, -redness, -discharge  ENT: -ear pain, -nasal congestion, -sore throat  Cardiovascular: -chest pain, +palpitations, -lower extremity edema  Respiratory: -cough, -shortness of breath  Gastrointestinal: -abdominal pain, -nausea, -vomiting, -diarrhea, -constipation, -blood in stool  Genitourinary: -dysuria, -hematuria, -frequency  Musculoskeletal: -joint pain, -muscle pain  Skin: -rash, -lesion  Neurological: -headache, -dizziness, -numbness, -tingling  Psychiatric: +anxiety, -depression, -sleep difficulty          Physical Exam    General: No acute distress. Well-developed. Well-nourished.  Eyes: EOMI. Sclerae anicteric.  HENT: Normocephalic. Atraumatic. Nares patent. Moist oral mucosa.  Ears: Bilateral TMs clear. Bilateral EACs " "clear.  Cardiovascular: Regular rate. Regular rhythm. No murmurs. No rubs. No gallops. Normal S1, S2.  Respiratory: Normal respiratory effort. Clear to auscultation bilaterally. No rales. No rhonchi. No wheezing.  Abdomen: Soft. Non-tender. Non-distended. Normoactive bowel sounds.  Musculoskeletal: No  obvious deformity.  Extremities: No lower extremity edema.  Neurological: Alert & oriented x3. No slurred speech. Normal gait.  Psychiatric: Normal mood. Normal affect. Good insight. Good judgment.  Skin: Warm. Dry. No rash.          Assessment & Plan    Reviewed patient's labs results, noting cholesterol and other values within normal range  Assessed medication refill needs and current supply issues    GENERALIZED ANXIETY DISORDER:  - Refilled trazodone.  - Continued Abilify.  - Refilled Xanax.    RESPIRATORY MANAGEMENT:  - Refilled albuterol inhaler as needed.    FOLLOW-UP:  - Follow up in 6 months.         This note was generated with the assistance of ambient listening technology. Verbal consent was obtained by the patient and accompanying visitor(s) for the recording of patient appointment to facilitate this note. I attest to having reviewed and edited the generated note for accuracy, though some syntax or spelling errors may persist. Please contact the author of this note for any clarification.      "This note will not be shared with the patient."      1. Benign essential HTN  CBC Auto Differential    Comprehensive Metabolic Panel    Microalbumin/Creatinine Ratio, Urine      2. Hyperlipidemia LDL goal <70  CBC Auto Differential    TSH    Comprehensive Metabolic Panel    Lipid Panel    rosuvastatin (CRESTOR) 5 MG tablet      3. Prediabetes  CBC Auto Differential    Comprehensive Metabolic Panel    Microalbumin/Creatinine Ratio, Urine    Hemoglobin A1C      4. Bipolar depression  CBC Auto Differential    Comprehensive Metabolic Panel    ALPRAZolam (XANAX) 1 MG tablet      5. BILL (generalized anxiety disorder)  CBC " Auto Differential    Comprehensive Metabolic Panel    ALPRAZolam (XANAX) 1 MG tablet      6. SOB (shortness of breath)  albuterol (PROVENTIL/VENTOLIN HFA) 90 mcg/actuation inhaler           Rtc as scheduled

## 2025-04-19 DIAGNOSIS — F31.9 BIPOLAR DEPRESSION: ICD-10-CM

## 2025-04-19 DIAGNOSIS — F41.1 GAD (GENERALIZED ANXIETY DISORDER): ICD-10-CM

## 2025-04-21 ENCOUNTER — TELEPHONE (OUTPATIENT)
Dept: INTERNAL MEDICINE | Facility: CLINIC | Age: 60
End: 2025-04-21
Payer: MEDICAID

## 2025-04-21 DIAGNOSIS — F51.01 PRIMARY INSOMNIA: ICD-10-CM

## 2025-04-21 RX ORDER — TRAZODONE HYDROCHLORIDE 100 MG/1
200 TABLET ORAL NIGHTLY
Qty: 180 TABLET | Refills: 1 | Status: SHIPPED | OUTPATIENT
Start: 2025-04-21 | End: 2026-04-21

## 2025-04-21 RX ORDER — ARIPIPRAZOLE 10 MG/1
10 TABLET ORAL
Qty: 90 TABLET | Refills: 1 | Status: SHIPPED | OUTPATIENT
Start: 2025-04-21

## 2025-04-21 NOTE — TELEPHONE ENCOUNTER
----- Message from Onarga sent at 4/21/2025  3:46 PM CDT -----  Contact: Pt  Lyndsey HuberRN: [id number]: 1965PCP: Dorothy WallaceHome Phone      [phone] Phone      Not on file.Mobile          [phone]: Pt needs a refill of traZODone (DESYREL) 100 MG tablet sent to [facility] in [place]. [phone]

## 2025-04-24 ENCOUNTER — TELEPHONE (OUTPATIENT)
Dept: INTERNAL MEDICINE | Facility: CLINIC | Age: 60
End: 2025-04-24
Payer: MEDICAID

## 2025-04-24 DIAGNOSIS — J45.909 ASTHMATIC BRONCHITIS WITHOUT COMPLICATION, UNSPECIFIED ASTHMA SEVERITY, UNSPECIFIED WHETHER PERSISTENT: Primary | ICD-10-CM

## 2025-04-24 NOTE — TELEPHONE ENCOUNTER
----- Message from Susan sent at 4/24/2025  9:11 AM CDT -----  Contact: Matt HuberRN: [id number]: 1965PCP: Dorothy WallaceHome Phone      [phone] Phone      Not on file.Mobile          [phone]: Pt is requesting a nebulizer be ordered and sent to [facility] in [place].Lyndsey [phone]

## 2025-05-26 ENCOUNTER — CLINICAL SUPPORT (OUTPATIENT)
Dept: INTERNAL MEDICINE | Facility: CLINIC | Age: 60
End: 2025-05-26
Payer: MEDICAID

## 2025-05-26 DIAGNOSIS — R73.03 PREDIABETES: ICD-10-CM

## 2025-05-26 DIAGNOSIS — F41.1 GAD (GENERALIZED ANXIETY DISORDER): ICD-10-CM

## 2025-05-26 DIAGNOSIS — F31.9 BIPOLAR DEPRESSION: ICD-10-CM

## 2025-05-26 DIAGNOSIS — E78.5 HYPERLIPIDEMIA LDL GOAL <70: ICD-10-CM

## 2025-05-26 DIAGNOSIS — I10 BENIGN ESSENTIAL HTN: ICD-10-CM

## 2025-05-26 LAB
ABSOLUTE EOSINOPHIL (OHS): 0.12 K/UL
ABSOLUTE MONOCYTE (OHS): 0.47 K/UL (ref 0.3–1)
ABSOLUTE NEUTROPHIL COUNT (OHS): 2.69 K/UL (ref 1.8–7.7)
ALBUMIN SERPL BCP-MCNC: 4 G/DL (ref 3.5–5.2)
ALBUMIN/CREAT UR: NORMAL
ALP SERPL-CCNC: 58 UNIT/L (ref 40–150)
ALT SERPL W/O P-5'-P-CCNC: 16 UNIT/L (ref 10–44)
ANION GAP (OHS): 12 MMOL/L (ref 8–16)
AST SERPL-CCNC: 24 UNIT/L (ref 11–45)
BASOPHILS # BLD AUTO: 0.04 K/UL
BASOPHILS NFR BLD AUTO: 0.8 %
BILIRUB SERPL-MCNC: 0.7 MG/DL (ref 0.1–1)
BUN SERPL-MCNC: 10 MG/DL (ref 6–20)
CALCIUM SERPL-MCNC: 9.7 MG/DL (ref 8.7–10.5)
CHLORIDE SERPL-SCNC: 102 MMOL/L (ref 95–110)
CHOLEST SERPL-MCNC: 182 MG/DL (ref 120–199)
CHOLEST/HDLC SERPL: 2.3 {RATIO} (ref 2–5)
CO2 SERPL-SCNC: 25 MMOL/L (ref 23–29)
CREAT SERPL-MCNC: 0.8 MG/DL (ref 0.5–1.4)
CREAT UR-MCNC: 71 MG/DL (ref 15–325)
EAG (OHS): 88 MG/DL (ref 68–131)
ERYTHROCYTE [DISTWIDTH] IN BLOOD BY AUTOMATED COUNT: 11.6 % (ref 11.5–14.5)
GFR SERPLBLD CREATININE-BSD FMLA CKD-EPI: >60 ML/MIN/1.73/M2
GLUCOSE SERPL-MCNC: 87 MG/DL (ref 70–110)
HBA1C MFR BLD: 4.7 % (ref 4–5.6)
HCT VFR BLD AUTO: 43.1 % (ref 37–48.5)
HDLC SERPL-MCNC: 78 MG/DL (ref 40–75)
HDLC SERPL: 42.9 % (ref 20–50)
HGB BLD-MCNC: 14.2 GM/DL (ref 12–16)
IMM GRANULOCYTES # BLD AUTO: 0.01 K/UL (ref 0–0.04)
IMM GRANULOCYTES NFR BLD AUTO: 0.2 % (ref 0–0.5)
LDLC SERPL CALC-MCNC: 90 MG/DL (ref 63–159)
LYMPHOCYTES # BLD AUTO: 1.72 K/UL (ref 1–4.8)
MCH RBC QN AUTO: 32.3 PG (ref 27–31)
MCHC RBC AUTO-ENTMCNC: 32.9 G/DL (ref 32–36)
MCV RBC AUTO: 98 FL (ref 82–98)
MICROALBUMIN UR-MCNC: <5 UG/ML (ref ?–5000)
NONHDLC SERPL-MCNC: 104 MG/DL
NUCLEATED RBC (/100WBC) (OHS): 0 /100 WBC
PLATELET # BLD AUTO: 149 K/UL (ref 150–450)
PMV BLD AUTO: 11 FL (ref 9.2–12.9)
POTASSIUM SERPL-SCNC: 4.4 MMOL/L (ref 3.5–5.1)
PROT SERPL-MCNC: 6.9 GM/DL (ref 6–8.4)
RBC # BLD AUTO: 4.39 M/UL (ref 4–5.4)
RELATIVE EOSINOPHIL (OHS): 2.4 %
RELATIVE LYMPHOCYTE (OHS): 34.1 % (ref 18–48)
RELATIVE MONOCYTE (OHS): 9.3 % (ref 4–15)
RELATIVE NEUTROPHIL (OHS): 53.2 % (ref 38–73)
SODIUM SERPL-SCNC: 139 MMOL/L (ref 136–145)
TRIGL SERPL-MCNC: 70 MG/DL (ref 30–150)
TSH SERPL-ACNC: 0.9 UIU/ML (ref 0.4–4)
WBC # BLD AUTO: 5.05 K/UL (ref 3.9–12.7)

## 2025-05-26 PROCEDURE — 84443 ASSAY THYROID STIM HORMONE: CPT

## 2025-05-26 PROCEDURE — 80061 LIPID PANEL: CPT

## 2025-05-26 PROCEDURE — 82040 ASSAY OF SERUM ALBUMIN: CPT

## 2025-05-26 PROCEDURE — 83036 HEMOGLOBIN GLYCOSYLATED A1C: CPT

## 2025-05-26 PROCEDURE — 82043 UR ALBUMIN QUANTITATIVE: CPT

## 2025-05-26 PROCEDURE — 85025 COMPLETE CBC W/AUTO DIFF WBC: CPT

## 2025-06-02 ENCOUNTER — OFFICE VISIT (OUTPATIENT)
Dept: INTERNAL MEDICINE | Facility: CLINIC | Age: 60
End: 2025-06-02
Payer: MEDICAID

## 2025-06-02 ENCOUNTER — TELEPHONE (OUTPATIENT)
Dept: INTERNAL MEDICINE | Facility: CLINIC | Age: 60
End: 2025-06-02

## 2025-06-02 VITALS
WEIGHT: 112.19 LBS | BODY MASS INDEX: 21.18 KG/M2 | HEART RATE: 107 BPM | SYSTOLIC BLOOD PRESSURE: 128 MMHG | DIASTOLIC BLOOD PRESSURE: 74 MMHG | OXYGEN SATURATION: 98 % | RESPIRATION RATE: 18 BRPM | HEIGHT: 61 IN

## 2025-06-02 DIAGNOSIS — I10 BENIGN ESSENTIAL HTN: Primary | ICD-10-CM

## 2025-06-02 DIAGNOSIS — R73.03 PREDIABETES: ICD-10-CM

## 2025-06-02 DIAGNOSIS — F41.1 GAD (GENERALIZED ANXIETY DISORDER): ICD-10-CM

## 2025-06-02 DIAGNOSIS — F31.9 BIPOLAR DEPRESSION: ICD-10-CM

## 2025-06-02 DIAGNOSIS — Z12.39 ENCOUNTER FOR SCREENING FOR MALIGNANT NEOPLASM OF BREAST, UNSPECIFIED SCREENING MODALITY: ICD-10-CM

## 2025-06-02 DIAGNOSIS — F51.01 PRIMARY INSOMNIA: ICD-10-CM

## 2025-06-02 DIAGNOSIS — E78.5 HYPERLIPIDEMIA LDL GOAL <70: ICD-10-CM

## 2025-06-02 PROCEDURE — 99214 OFFICE O/P EST MOD 30 MIN: CPT | Mod: S$PBB,,, | Performed by: NURSE PRACTITIONER

## 2025-06-02 PROCEDURE — 3044F HG A1C LEVEL LT 7.0%: CPT | Mod: CPTII,,, | Performed by: NURSE PRACTITIONER

## 2025-06-02 PROCEDURE — 99213 OFFICE O/P EST LOW 20 MIN: CPT | Mod: PBBFAC,PN | Performed by: NURSE PRACTITIONER

## 2025-06-02 PROCEDURE — 3061F NEG MICROALBUMINURIA REV: CPT | Mod: CPTII,,, | Performed by: NURSE PRACTITIONER

## 2025-06-02 PROCEDURE — 3008F BODY MASS INDEX DOCD: CPT | Mod: CPTII,,, | Performed by: NURSE PRACTITIONER

## 2025-06-02 PROCEDURE — 3066F NEPHROPATHY DOC TX: CPT | Mod: CPTII,,, | Performed by: NURSE PRACTITIONER

## 2025-06-02 PROCEDURE — 3074F SYST BP LT 130 MM HG: CPT | Mod: CPTII,,, | Performed by: NURSE PRACTITIONER

## 2025-06-02 PROCEDURE — 3078F DIAST BP <80 MM HG: CPT | Mod: CPTII,,, | Performed by: NURSE PRACTITIONER

## 2025-06-02 PROCEDURE — 99999 PR PBB SHADOW E&M-EST. PATIENT-LVL III: CPT | Mod: PBBFAC,,, | Performed by: NURSE PRACTITIONER

## 2025-06-02 PROCEDURE — 1159F MED LIST DOCD IN RCRD: CPT | Mod: CPTII,,, | Performed by: NURSE PRACTITIONER

## 2025-06-02 RX ORDER — ALPRAZOLAM 1 MG/1
1 TABLET ORAL 4 TIMES DAILY PRN
Qty: 120 TABLET | Refills: 2 | Status: SHIPPED | OUTPATIENT
Start: 2025-06-02 | End: 2025-06-02 | Stop reason: SDUPTHER

## 2025-06-02 RX ORDER — ALPRAZOLAM 1 MG/1
1 TABLET ORAL 4 TIMES DAILY PRN
Qty: 120 TABLET | Refills: 2 | Status: SHIPPED | OUTPATIENT
Start: 2025-06-02 | End: 2025-06-03

## 2025-06-03 ENCOUNTER — TELEPHONE (OUTPATIENT)
Dept: INTERNAL MEDICINE | Facility: CLINIC | Age: 60
End: 2025-06-03
Payer: MEDICAID

## 2025-06-03 DIAGNOSIS — F41.1 GAD (GENERALIZED ANXIETY DISORDER): ICD-10-CM

## 2025-06-03 RX ORDER — ALPRAZOLAM 1 MG/1
1 TABLET ORAL 3 TIMES DAILY PRN
Start: 2025-06-03 | End: 2025-06-03 | Stop reason: SDUPTHER

## 2025-06-03 RX ORDER — ALPRAZOLAM 1 MG/1
1 TABLET ORAL 3 TIMES DAILY PRN
Qty: 90 TABLET | Refills: 2 | Status: SHIPPED | OUTPATIENT
Start: 2025-06-03 | End: 2025-07-03

## 2025-06-10 DIAGNOSIS — E78.5 HYPERLIPIDEMIA LDL GOAL <70: ICD-10-CM

## 2025-06-10 RX ORDER — ROSUVASTATIN CALCIUM 5 MG/1
5 TABLET, COATED ORAL NIGHTLY
Qty: 90 TABLET | Refills: 1 | Status: SHIPPED | OUTPATIENT
Start: 2025-06-10

## 2025-06-16 ENCOUNTER — TELEPHONE (OUTPATIENT)
Dept: INTERNAL MEDICINE | Facility: CLINIC | Age: 60
End: 2025-06-16
Payer: MEDICAID

## 2025-06-16 DIAGNOSIS — E78.5 HYPERLIPIDEMIA LDL GOAL <70: ICD-10-CM

## 2025-06-16 RX ORDER — ROSUVASTATIN CALCIUM 5 MG/1
5 TABLET, COATED ORAL NIGHTLY
Qty: 90 TABLET | Refills: 1 | Status: SHIPPED | OUTPATIENT
Start: 2025-06-16

## 2025-06-16 NOTE — TELEPHONE ENCOUNTER
----- Message from Med Assistant Dolan sent at 6/10/2025  3:24 PM CDT -----  Contact: Pt    ----- Message -----  From: Susan Shook  Sent: 6/10/2025   2:39 PM CDT  To: Rodrigo CHENEY Staff    Lyndsey Elvia  MRN: [id number]  : 1965  PCP: Dorothy Wallace.  Home Phone      [phone]  Work Phone      Not on file.  Mobile          [phone]      MESSAGE:     Pt needs a refill of rosuvastatin (CRESTOR) 5 MG tablet sent to [facility] in [place].       Lyndsey  [phone]

## 2025-08-22 ENCOUNTER — TELEPHONE (OUTPATIENT)
Dept: INTERNAL MEDICINE | Facility: CLINIC | Age: 60
End: 2025-08-22
Payer: MEDICAID

## 2025-08-22 DIAGNOSIS — F41.1 GAD (GENERALIZED ANXIETY DISORDER): ICD-10-CM

## 2025-08-22 RX ORDER — ALPRAZOLAM 1 MG/1
1 TABLET ORAL 3 TIMES DAILY PRN
Qty: 90 TABLET | Refills: 2 | Status: SHIPPED | OUTPATIENT
Start: 2025-08-22 | End: 2025-09-21